# Patient Record
Sex: FEMALE | Race: WHITE | HISPANIC OR LATINO
[De-identification: names, ages, dates, MRNs, and addresses within clinical notes are randomized per-mention and may not be internally consistent; named-entity substitution may affect disease eponyms.]

---

## 2018-09-10 ENCOUNTER — RECORD ABSTRACTING (OUTPATIENT)
Age: 53
End: 2018-09-10

## 2018-09-10 DIAGNOSIS — Z86.59 PERSONAL HISTORY OF OTHER MENTAL AND BEHAVIORAL DISORDERS: ICD-10-CM

## 2018-09-10 DIAGNOSIS — Z99.89 OBSTRUCTIVE SLEEP APNEA (ADULT) (PEDIATRIC): ICD-10-CM

## 2018-09-10 DIAGNOSIS — Z78.9 OTHER SPECIFIED HEALTH STATUS: ICD-10-CM

## 2018-09-10 DIAGNOSIS — R73.01 IMPAIRED FASTING GLUCOSE: ICD-10-CM

## 2018-09-10 DIAGNOSIS — E55.9 VITAMIN D DEFICIENCY, UNSPECIFIED: ICD-10-CM

## 2018-09-10 DIAGNOSIS — Z86.39 PERSONAL HISTORY OF OTHER ENDOCRINE, NUTRITIONAL AND METABOLIC DISEASE: ICD-10-CM

## 2018-09-10 DIAGNOSIS — Z86.018 PERSONAL HISTORY OF OTHER BENIGN NEOPLASM: ICD-10-CM

## 2018-09-10 DIAGNOSIS — G47.33 OBSTRUCTIVE SLEEP APNEA (ADULT) (PEDIATRIC): ICD-10-CM

## 2018-09-10 DIAGNOSIS — I10 ESSENTIAL (PRIMARY) HYPERTENSION: ICD-10-CM

## 2018-09-10 DIAGNOSIS — Z83.3 FAMILY HISTORY OF DIABETES MELLITUS: ICD-10-CM

## 2018-09-10 DIAGNOSIS — Z83.49 FAMILY HISTORY OF OTHER ENDOCRINE, NUTRITIONAL AND METABOLIC DISEASES: ICD-10-CM

## 2018-09-10 LAB
HBA1C MFR BLD HPLC: 5.8
HBA1C MFR BLD: 5.8
HBA1C MFR BLD: 5.8

## 2018-09-14 ENCOUNTER — APPOINTMENT (OUTPATIENT)
Dept: ENDOCRINOLOGY | Facility: CLINIC | Age: 53
End: 2018-09-14

## 2019-10-28 ENCOUNTER — EMERGENCY (EMERGENCY)
Facility: HOSPITAL | Age: 54
LOS: 1 days | Discharge: HOME | End: 2019-10-28
Attending: EMERGENCY MEDICINE
Payer: COMMERCIAL

## 2019-10-28 VITALS
HEART RATE: 70 BPM | SYSTOLIC BLOOD PRESSURE: 140 MMHG | DIASTOLIC BLOOD PRESSURE: 83 MMHG | TEMPERATURE: 97 F | OXYGEN SATURATION: 100 % | RESPIRATION RATE: 18 BRPM

## 2019-10-28 VITALS
TEMPERATURE: 98 F | RESPIRATION RATE: 18 BRPM | OXYGEN SATURATION: 99 % | WEIGHT: 212.08 LBS | HEIGHT: 63 IN | HEART RATE: 65 BPM | SYSTOLIC BLOOD PRESSURE: 148 MMHG | DIASTOLIC BLOOD PRESSURE: 85 MMHG

## 2019-10-28 LAB
ALBUMIN SERPL ELPH-MCNC: 4.5 G/DL — SIGNIFICANT CHANGE UP (ref 3.3–5.2)
ALP SERPL-CCNC: 132 U/L — HIGH (ref 40–120)
ALT FLD-CCNC: 21 U/L — SIGNIFICANT CHANGE UP
ANION GAP SERPL CALC-SCNC: 11 MMOL/L — SIGNIFICANT CHANGE UP (ref 5–17)
AST SERPL-CCNC: 20 U/L — SIGNIFICANT CHANGE UP
BASOPHILS # BLD AUTO: 0.04 K/UL — SIGNIFICANT CHANGE UP (ref 0–0.2)
BASOPHILS NFR BLD AUTO: 0.6 % — SIGNIFICANT CHANGE UP (ref 0–2)
BILIRUB SERPL-MCNC: 0.3 MG/DL — LOW (ref 0.4–2)
BUN SERPL-MCNC: 18 MG/DL — SIGNIFICANT CHANGE UP (ref 8–20)
CALCIUM SERPL-MCNC: 9.8 MG/DL — SIGNIFICANT CHANGE UP (ref 8.6–10.2)
CHLORIDE SERPL-SCNC: 104 MMOL/L — SIGNIFICANT CHANGE UP (ref 98–107)
CO2 SERPL-SCNC: 27 MMOL/L — SIGNIFICANT CHANGE UP (ref 22–29)
CREAT SERPL-MCNC: 0.49 MG/DL — LOW (ref 0.5–1.3)
EOSINOPHIL # BLD AUTO: 0.2 K/UL — SIGNIFICANT CHANGE UP (ref 0–0.5)
EOSINOPHIL NFR BLD AUTO: 3.2 % — SIGNIFICANT CHANGE UP (ref 0–6)
GLUCOSE SERPL-MCNC: 104 MG/DL — SIGNIFICANT CHANGE UP (ref 70–115)
HCT VFR BLD CALC: 43.3 % — SIGNIFICANT CHANGE UP (ref 34.5–45)
HGB BLD-MCNC: 13.5 G/DL — SIGNIFICANT CHANGE UP (ref 11.5–15.5)
IMM GRANULOCYTES NFR BLD AUTO: 0.2 % — SIGNIFICANT CHANGE UP (ref 0–1.5)
INR BLD: 0.99 RATIO — SIGNIFICANT CHANGE UP (ref 0.88–1.16)
LYMPHOCYTES # BLD AUTO: 1.97 K/UL — SIGNIFICANT CHANGE UP (ref 1–3.3)
LYMPHOCYTES # BLD AUTO: 31.6 % — SIGNIFICANT CHANGE UP (ref 13–44)
MCHC RBC-ENTMCNC: 28 PG — SIGNIFICANT CHANGE UP (ref 27–34)
MCHC RBC-ENTMCNC: 31.2 GM/DL — LOW (ref 32–36)
MCV RBC AUTO: 89.8 FL — SIGNIFICANT CHANGE UP (ref 80–100)
MONOCYTES # BLD AUTO: 0.47 K/UL — SIGNIFICANT CHANGE UP (ref 0–0.9)
MONOCYTES NFR BLD AUTO: 7.5 % — SIGNIFICANT CHANGE UP (ref 2–14)
NEUTROPHILS # BLD AUTO: 3.54 K/UL — SIGNIFICANT CHANGE UP (ref 1.8–7.4)
NEUTROPHILS NFR BLD AUTO: 56.9 % — SIGNIFICANT CHANGE UP (ref 43–77)
PLATELET # BLD AUTO: 253 K/UL — SIGNIFICANT CHANGE UP (ref 150–400)
POTASSIUM SERPL-MCNC: 4.1 MMOL/L — SIGNIFICANT CHANGE UP (ref 3.5–5.3)
POTASSIUM SERPL-SCNC: 4.1 MMOL/L — SIGNIFICANT CHANGE UP (ref 3.5–5.3)
PROT SERPL-MCNC: 7.7 G/DL — SIGNIFICANT CHANGE UP (ref 6.6–8.7)
PROTHROM AB SERPL-ACNC: 11.4 SEC — SIGNIFICANT CHANGE UP (ref 10–12.9)
RBC # BLD: 4.82 M/UL — SIGNIFICANT CHANGE UP (ref 3.8–5.2)
RBC # FLD: 13.5 % — SIGNIFICANT CHANGE UP (ref 10.3–14.5)
SODIUM SERPL-SCNC: 142 MMOL/L — SIGNIFICANT CHANGE UP (ref 135–145)
WBC # BLD: 6.23 K/UL — SIGNIFICANT CHANGE UP (ref 3.8–10.5)
WBC # FLD AUTO: 6.23 K/UL — SIGNIFICANT CHANGE UP (ref 3.8–10.5)

## 2019-10-28 PROCEDURE — 85027 COMPLETE CBC AUTOMATED: CPT

## 2019-10-28 PROCEDURE — 99284 EMERGENCY DEPT VISIT MOD MDM: CPT | Mod: 25

## 2019-10-28 PROCEDURE — 96374 THER/PROPH/DIAG INJ IV PUSH: CPT

## 2019-10-28 PROCEDURE — 70450 CT HEAD/BRAIN W/O DYE: CPT

## 2019-10-28 PROCEDURE — 36415 COLL VENOUS BLD VENIPUNCTURE: CPT

## 2019-10-28 PROCEDURE — 85610 PROTHROMBIN TIME: CPT

## 2019-10-28 PROCEDURE — 96375 TX/PRO/DX INJ NEW DRUG ADDON: CPT

## 2019-10-28 PROCEDURE — 70450 CT HEAD/BRAIN W/O DYE: CPT | Mod: 26

## 2019-10-28 PROCEDURE — 96376 TX/PRO/DX INJ SAME DRUG ADON: CPT

## 2019-10-28 PROCEDURE — 80053 COMPREHEN METABOLIC PANEL: CPT

## 2019-10-28 PROCEDURE — 99284 EMERGENCY DEPT VISIT MOD MDM: CPT

## 2019-10-28 RX ORDER — KETOROLAC TROMETHAMINE 30 MG/ML
15 SYRINGE (ML) INJECTION ONCE
Refills: 0 | Status: DISCONTINUED | OUTPATIENT
Start: 2019-10-28 | End: 2019-10-28

## 2019-10-28 RX ORDER — SODIUM CHLORIDE 9 MG/ML
1000 INJECTION INTRAMUSCULAR; INTRAVENOUS; SUBCUTANEOUS ONCE
Refills: 0 | Status: COMPLETED | OUTPATIENT
Start: 2019-10-28 | End: 2019-10-28

## 2019-10-28 RX ORDER — METOCLOPRAMIDE HCL 10 MG
10 TABLET ORAL ONCE
Refills: 0 | Status: COMPLETED | OUTPATIENT
Start: 2019-10-28 | End: 2019-10-28

## 2019-10-28 RX ORDER — SODIUM CHLORIDE 9 MG/ML
3 INJECTION INTRAMUSCULAR; INTRAVENOUS; SUBCUTANEOUS EVERY 8 HOURS
Refills: 0 | Status: DISCONTINUED | OUTPATIENT
Start: 2019-10-28 | End: 2019-11-17

## 2019-10-28 RX ADMIN — Medication 10 MILLIGRAM(S): at 06:54

## 2019-10-28 RX ADMIN — SODIUM CHLORIDE 1000 MILLILITER(S): 9 INJECTION INTRAMUSCULAR; INTRAVENOUS; SUBCUTANEOUS at 06:54

## 2019-10-28 RX ADMIN — Medication 15 MILLIGRAM(S): at 06:53

## 2019-10-28 RX ADMIN — Medication 2 TABLET(S): at 08:30

## 2019-10-28 RX ADMIN — Medication 15 MILLIGRAM(S): at 08:30

## 2019-10-28 NOTE — ED ADULT NURSE NOTE - OBJECTIVE STATEMENT
Pt. presents AxOx4 to ED complaining of 9/20 headache radiating to R. neck with light sensitivity. Pt. reports having chronic migraines, pt was taking Tylenol multiple times at home with little to no relief. Pt. denies numbness, tingling, vision changes. Pt. reports last dose of Tylenol at 0000.

## 2019-10-28 NOTE — ED PROVIDER NOTE - PATIENT PORTAL LINK FT
You can access the FollowMyHealth Patient Portal offered by [facility] by registering at the following website: [email or web address]. By joining [employer]’s FollowMyHealth portal, you will also be able to view your health information using other applications (apps) compatible with our system.

## 2019-10-28 NOTE — ED PROVIDER NOTE - PROGRESS NOTE DETAILS
John: pt signed out to me with headache, no wbc, ct head neg, pt states pain starting to improve, no photophobia or neck pain/stiffness, nl neuro. received 15 toradol and 10 reglan ~45 mins prior, still with some pain, will give fiorcet and another 15mg toradol, reassess. pt/daughter report has had one similar episode of migraines that were as intense as this in past about 8 months ago, self resolved. follows with neuro. John: pt feels much better, states pain completely resolved, sitting up talking and smiling, nad. sofya prescribe fioricet, has neuro f/u. return precautions stable for d/c.

## 2019-10-28 NOTE — ED ADULT TRIAGE NOTE - CHIEF COMPLAINT QUOTE
Pt. complaining of migraine radiating to right neck that began yesterday.  Pt. states she has a history of migraines but never one this bad and never one that radiates down her neck.  Pt. states headache is located on her right side and is severe behind her right eye and it radiates to posterior right side.   Pt. complaining of concurrent nausea.  Pt.  has been taking tylenol for pain without relief, last dose taken at 00:00.  Pt. denies vision changes.

## 2019-10-28 NOTE — ED PROVIDER NOTE - OBJECTIVE STATEMENT
53 yo female presents complaining of generalized headache starting three days ago. She states that the headache has gradually increased over time, and is localized behind her eyes, and radiates down into her maxillary region and right side of her neck. She states walking makes it worse and acetaminophen helped very little. Denies fever, chills, trauma., focal neurologic symptoms or other concerns. Patient states she was taking acetaminophen 650 mg every 6 hours for the past two days. However, nothing was taken since 10 pm last night. Patient complaining of some nausea.

## 2019-10-28 NOTE — ED ADULT NURSE NOTE - CHPI ED NUR SYMPTOMS NEG
no fever/no nausea/no change in level of consciousness/no vomiting/no loss of consciousness/no numbness/no weakness/no dizziness/no confusion/no blurred vision

## 2019-10-28 NOTE — ED PROVIDER NOTE - CHPI ED SYMPTOMS NEG
no confusion/no weakness/no dizziness/no fever/no loss of consciousness/no numbness/no change in level of consciousness/no blurred vision/no vomiting

## 2019-10-28 NOTE — ED ADULT NURSE NOTE - CAS EDN DISCHARGE ASSESSMENT
Alert and oriented to person, place and time/Symptoms improved/Dressing clean and dry/No adverse reaction to first time med in ED/Patient baseline mental status/Awake

## 2020-02-14 ENCOUNTER — APPOINTMENT (OUTPATIENT)
Dept: PULMONOLOGY | Facility: CLINIC | Age: 55
End: 2020-02-14

## 2020-03-05 ENCOUNTER — APPOINTMENT (OUTPATIENT)
Dept: GASTROENTEROLOGY | Facility: CLINIC | Age: 55
End: 2020-03-05
Payer: COMMERCIAL

## 2020-03-05 VITALS
SYSTOLIC BLOOD PRESSURE: 134 MMHG | HEART RATE: 75 BPM | BODY MASS INDEX: 35.36 KG/M2 | HEIGHT: 66 IN | DIASTOLIC BLOOD PRESSURE: 98 MMHG | RESPIRATION RATE: 17 BRPM | WEIGHT: 220 LBS

## 2020-03-05 PROCEDURE — 99203 OFFICE O/P NEW LOW 30 MIN: CPT

## 2020-03-05 RX ORDER — METFORMIN ER 500 MG 500 MG/1
500 TABLET ORAL
Refills: 0 | Status: DISCONTINUED | COMMUNITY
End: 2020-03-05

## 2020-03-05 NOTE — ASSESSMENT
[FreeTextEntry1] : Patient is at average risk for colorectal cancer.The bowel preparation was discussed at length. Risks (including bleeding, pain, perforation, incomplete examination, splenic laceration, adverse reactions to medications, aspiration and death), benefits and alternatives were discussed. Patient is agreeable for the colonoscopy. The patient is medically optimized for the procedure. We will schedule the patient for the procedure. Bowel preparation was sent to the pharmacy.\par \par Alton Crain MD\par Gastroenterology \par \par

## 2020-03-05 NOTE — HISTORY OF PRESENT ILLNESS
[de-identified] : Patient arrived for consultation. The patient has a history of sleeve gastrectomy. She has lost about 50 pounds after the surgery. She is here for consultation for a colonoscopy. She is denying any GI symptoms. He never had a colonoscopy before. No family history of colorectal cancer. No cardiac disease or pulmonary disease. No change in bowel habits or any blood in stool

## 2020-04-05 ENCOUNTER — EMERGENCY (EMERGENCY)
Facility: HOSPITAL | Age: 55
LOS: 1 days | Discharge: HOME | End: 2020-04-05
Attending: EMERGENCY MEDICINE
Payer: COMMERCIAL

## 2020-04-05 VITALS — RESPIRATION RATE: 20 BRPM | OXYGEN SATURATION: 94 %

## 2020-04-05 VITALS
WEIGHT: 210.1 LBS | OXYGEN SATURATION: 96 % | RESPIRATION RATE: 25 BRPM | SYSTOLIC BLOOD PRESSURE: 90 MMHG | TEMPERATURE: 103 F | HEIGHT: 66 IN | HEART RATE: 101 BPM | DIASTOLIC BLOOD PRESSURE: 61 MMHG

## 2020-04-05 LAB
ALBUMIN SERPL ELPH-MCNC: 4 G/DL — SIGNIFICANT CHANGE UP (ref 3.3–5.2)
ALP SERPL-CCNC: 146 U/L — HIGH (ref 40–120)
ALT FLD-CCNC: 85 U/L — HIGH
ANION GAP SERPL CALC-SCNC: 14 MMOL/L — SIGNIFICANT CHANGE UP (ref 5–17)
AST SERPL-CCNC: 51 U/L — HIGH
BASOPHILS # BLD AUTO: 0.01 K/UL — SIGNIFICANT CHANGE UP (ref 0–0.2)
BASOPHILS NFR BLD AUTO: 0.1 % — SIGNIFICANT CHANGE UP (ref 0–2)
BILIRUB SERPL-MCNC: 0.4 MG/DL — SIGNIFICANT CHANGE UP (ref 0.4–2)
BUN SERPL-MCNC: 13 MG/DL — SIGNIFICANT CHANGE UP (ref 8–20)
CALCIUM SERPL-MCNC: 8.8 MG/DL — SIGNIFICANT CHANGE UP (ref 8.6–10.2)
CHLORIDE SERPL-SCNC: 100 MMOL/L — SIGNIFICANT CHANGE UP (ref 98–107)
CK SERPL-CCNC: 42 U/L — SIGNIFICANT CHANGE UP (ref 25–170)
CO2 SERPL-SCNC: 23 MMOL/L — SIGNIFICANT CHANGE UP (ref 22–29)
CREAT SERPL-MCNC: 0.56 MG/DL — SIGNIFICANT CHANGE UP (ref 0.5–1.3)
EOSINOPHIL # BLD AUTO: 0 K/UL — SIGNIFICANT CHANGE UP (ref 0–0.5)
EOSINOPHIL NFR BLD AUTO: 0 % — SIGNIFICANT CHANGE UP (ref 0–6)
GLUCOSE SERPL-MCNC: 118 MG/DL — HIGH (ref 70–99)
HCT VFR BLD CALC: 38.7 % — SIGNIFICANT CHANGE UP (ref 34.5–45)
HGB BLD-MCNC: 12.7 G/DL — SIGNIFICANT CHANGE UP (ref 11.5–15.5)
IMM GRANULOCYTES NFR BLD AUTO: 0.6 % — SIGNIFICANT CHANGE UP (ref 0–1.5)
LACTATE BLDV-MCNC: 1 MMOL/L — SIGNIFICANT CHANGE UP (ref 0.5–2)
LYMPHOCYTES # BLD AUTO: 1.09 K/UL — SIGNIFICANT CHANGE UP (ref 1–3.3)
LYMPHOCYTES # BLD AUTO: 15.9 % — SIGNIFICANT CHANGE UP (ref 13–44)
MCHC RBC-ENTMCNC: 28.2 PG — SIGNIFICANT CHANGE UP (ref 27–34)
MCHC RBC-ENTMCNC: 32.8 GM/DL — SIGNIFICANT CHANGE UP (ref 32–36)
MCV RBC AUTO: 86 FL — SIGNIFICANT CHANGE UP (ref 80–100)
MONOCYTES # BLD AUTO: 0.38 K/UL — SIGNIFICANT CHANGE UP (ref 0–0.9)
MONOCYTES NFR BLD AUTO: 5.5 % — SIGNIFICANT CHANGE UP (ref 2–14)
NEUTROPHILS # BLD AUTO: 5.34 K/UL — SIGNIFICANT CHANGE UP (ref 1.8–7.4)
NEUTROPHILS NFR BLD AUTO: 77.9 % — HIGH (ref 43–77)
PLATELET # BLD AUTO: 221 K/UL — SIGNIFICANT CHANGE UP (ref 150–400)
POTASSIUM SERPL-MCNC: 4.1 MMOL/L — SIGNIFICANT CHANGE UP (ref 3.5–5.3)
POTASSIUM SERPL-SCNC: 4.1 MMOL/L — SIGNIFICANT CHANGE UP (ref 3.5–5.3)
PROT SERPL-MCNC: 7.3 G/DL — SIGNIFICANT CHANGE UP (ref 6.6–8.7)
RBC # BLD: 4.5 M/UL — SIGNIFICANT CHANGE UP (ref 3.8–5.2)
RBC # FLD: 13.2 % — SIGNIFICANT CHANGE UP (ref 10.3–14.5)
SODIUM SERPL-SCNC: 137 MMOL/L — SIGNIFICANT CHANGE UP (ref 135–145)
TROPONIN T SERPL-MCNC: <0.01 NG/ML — SIGNIFICANT CHANGE UP (ref 0–0.06)
WBC # BLD: 6.86 K/UL — SIGNIFICANT CHANGE UP (ref 3.8–10.5)
WBC # FLD AUTO: 6.86 K/UL — SIGNIFICANT CHANGE UP (ref 3.8–10.5)

## 2020-04-05 PROCEDURE — 87040 BLOOD CULTURE FOR BACTERIA: CPT

## 2020-04-05 PROCEDURE — 94640 AIRWAY INHALATION TREATMENT: CPT

## 2020-04-05 PROCEDURE — 93010 ELECTROCARDIOGRAM REPORT: CPT

## 2020-04-05 PROCEDURE — 84484 ASSAY OF TROPONIN QUANT: CPT

## 2020-04-05 PROCEDURE — 99285 EMERGENCY DEPT VISIT HI MDM: CPT

## 2020-04-05 PROCEDURE — 96375 TX/PRO/DX INJ NEW DRUG ADDON: CPT

## 2020-04-05 PROCEDURE — 85027 COMPLETE CBC AUTOMATED: CPT

## 2020-04-05 PROCEDURE — 82550 ASSAY OF CK (CPK): CPT

## 2020-04-05 PROCEDURE — 80053 COMPREHEN METABOLIC PANEL: CPT

## 2020-04-05 PROCEDURE — 36415 COLL VENOUS BLD VENIPUNCTURE: CPT

## 2020-04-05 PROCEDURE — 99284 EMERGENCY DEPT VISIT MOD MDM: CPT | Mod: 25

## 2020-04-05 PROCEDURE — 93005 ELECTROCARDIOGRAM TRACING: CPT

## 2020-04-05 PROCEDURE — 96374 THER/PROPH/DIAG INJ IV PUSH: CPT

## 2020-04-05 PROCEDURE — 83605 ASSAY OF LACTIC ACID: CPT

## 2020-04-05 PROCEDURE — T1013: CPT

## 2020-04-05 PROCEDURE — 71250 CT THORAX DX C-: CPT | Mod: 26

## 2020-04-05 PROCEDURE — 71250 CT THORAX DX C-: CPT

## 2020-04-05 RX ORDER — ACETAMINOPHEN 500 MG
2 TABLET ORAL
Qty: 60 | Refills: 0
Start: 2020-04-05 | End: 2020-04-09

## 2020-04-05 RX ORDER — ALBUTEROL 90 UG/1
2 AEROSOL, METERED ORAL
Qty: 1 | Refills: 0
Start: 2020-04-05

## 2020-04-05 RX ORDER — ONDANSETRON 8 MG/1
4 TABLET, FILM COATED ORAL ONCE
Refills: 0 | Status: COMPLETED | OUTPATIENT
Start: 2020-04-05 | End: 2020-04-05

## 2020-04-05 RX ORDER — FAMOTIDINE 10 MG/ML
20 INJECTION INTRAVENOUS ONCE
Refills: 0 | Status: COMPLETED | OUTPATIENT
Start: 2020-04-05 | End: 2020-04-05

## 2020-04-05 RX ORDER — MAGNESIUM SULFATE 500 MG/ML
2 VIAL (ML) INJECTION ONCE
Refills: 0 | Status: COMPLETED | OUTPATIENT
Start: 2020-04-05 | End: 2020-04-05

## 2020-04-05 RX ORDER — SODIUM CHLORIDE 9 MG/ML
2500 INJECTION INTRAMUSCULAR; INTRAVENOUS; SUBCUTANEOUS ONCE
Refills: 0 | Status: COMPLETED | OUTPATIENT
Start: 2020-04-05 | End: 2020-04-05

## 2020-04-05 RX ORDER — ACETAMINOPHEN 500 MG
2 TABLET ORAL
Qty: 60 | Refills: 0
Start: 2020-04-05 | End: 2020-04-10

## 2020-04-05 RX ORDER — ACETAMINOPHEN 500 MG
650 TABLET ORAL ONCE
Refills: 0 | Status: COMPLETED | OUTPATIENT
Start: 2020-04-05 | End: 2020-04-05

## 2020-04-05 RX ORDER — ALBUTEROL 90 UG/1
1 AEROSOL, METERED ORAL ONCE
Refills: 0 | Status: COMPLETED | OUTPATIENT
Start: 2020-04-05 | End: 2020-04-05

## 2020-04-05 RX ADMIN — ALBUTEROL 1 PUFF(S): 90 AEROSOL, METERED ORAL at 19:42

## 2020-04-05 RX ADMIN — Medication 650 MILLIGRAM(S): at 18:52

## 2020-04-05 RX ADMIN — Medication 50 GRAM(S): at 19:41

## 2020-04-05 RX ADMIN — ONDANSETRON 4 MILLIGRAM(S): 8 TABLET, FILM COATED ORAL at 19:42

## 2020-04-05 RX ADMIN — SODIUM CHLORIDE 2500 MILLILITER(S): 9 INJECTION INTRAMUSCULAR; INTRAVENOUS; SUBCUTANEOUS at 19:41

## 2020-04-05 RX ADMIN — FAMOTIDINE 20 MILLIGRAM(S): 10 INJECTION INTRAVENOUS at 19:42

## 2020-04-05 NOTE — ED STATDOCS - PATIENT PORTAL LINK FT
You can access the FollowMyHealth Patient Portal offered by [facility] by registering at the following website: [email or web address]. By joining [employer]’s FollowMyHealth portal, you will also be able to view your health information using other applications (apps) compatible with our system.

## 2020-04-05 NOTE — ED STATDOCS - CLINICAL SUMMARY MEDICAL DECISION MAKING FREE TEXT BOX
Labs, line, EKG, CT chest, medications Labs, line, EKG, CT chest, medications, pt appears dehydraiton, given she was on azithro and hydroxychloroquinine, will checl qtc as well and reassess, pt had cxr earlier which showed early pna, will do ct chest w/o contrast for better characterizaiton Labs, line, EKG, CT chest, medications, pt appears dehydration, given she was on azithro , will check qtc as well and reassess, pt had cxr earlier which showed early pna, will do ct chest w/o contrast for better characterization

## 2020-04-05 NOTE — ED STATDOCS - PROGRESS NOTE DETAILS
DIANE Lemus: Patient evaluated by intake physician. HPI/ROS/PE as noted above. Will follow up plan per intake physician and continue to assess patient. DIANE Lemus:  Reji. VS improved. Patient O2 96% on RA, 94% with ambulation. Patient stable for discharge. Discussed CT findings, continue abx, strict return precautions provided. Patient verbalizes understanding and agreement.

## 2020-04-05 NOTE — ED STATDOCS - OBJECTIVE STATEMENT
53 y/o female with PMHx of Asthma and Migraines presents to ED c/o SOB. Patient reports COVID + given Cefdinir and Azithromycin by Urgent care and now presents to ED feeling SOB, weak, pain on inspiration, and fever.     : Nicole

## 2020-04-10 LAB
CULTURE RESULTS: SIGNIFICANT CHANGE UP
SPECIMEN SOURCE: SIGNIFICANT CHANGE UP

## 2020-04-20 ENCOUNTER — APPOINTMENT (OUTPATIENT)
Dept: GASTROENTEROLOGY | Facility: CLINIC | Age: 55
End: 2020-04-20

## 2020-05-12 ENCOUNTER — APPOINTMENT (OUTPATIENT)
Dept: PULMONOLOGY | Facility: CLINIC | Age: 55
End: 2020-05-12
Payer: COMMERCIAL

## 2020-05-12 VITALS
RESPIRATION RATE: 16 BRPM | WEIGHT: 224 LBS | DIASTOLIC BLOOD PRESSURE: 72 MMHG | OXYGEN SATURATION: 98 % | HEART RATE: 75 BPM | HEIGHT: 65 IN | SYSTOLIC BLOOD PRESSURE: 128 MMHG | BODY MASS INDEX: 37.32 KG/M2

## 2020-05-12 DIAGNOSIS — Z86.59 PERSONAL HISTORY OF OTHER MENTAL AND BEHAVIORAL DISORDERS: ICD-10-CM

## 2020-05-12 DIAGNOSIS — Z86.79 PERSONAL HISTORY OF OTHER DISEASES OF THE CIRCULATORY SYSTEM: ICD-10-CM

## 2020-05-12 DIAGNOSIS — R06.02 SHORTNESS OF BREATH: ICD-10-CM

## 2020-05-12 PROCEDURE — 99204 OFFICE O/P NEW MOD 45 MIN: CPT

## 2020-05-12 NOTE — REASON FOR VISIT
[Initial] : an initial visit [Sleep Apnea] : sleep apnea [Obesity] : obesity [Shortness of Breath] : shortness of breath [Patient Declined  Services] : - None: Patient declined  services [FreeTextEntry3] : daughter translated [TWNoteComboBox1] : [place]

## 2020-05-12 NOTE — CONSULT LETTER
[Dear  ___] : Dear  [unfilled], [Consult Letter:] : I had the pleasure of evaluating your patient, [unfilled]. [Please see my note below.] : Please see my note below. [Consult Closing:] : Thank you very much for allowing me to participate in the care of this patient.  If you have any questions, please do not hesitate to contact me. [Sincerely,] : Sincerely, [FreeTextEntry3] : Benito Ambrocio MD, FCCP, D. ABSM\par Pulmonary and Sleep Medicine\par [facility] [place]

## 2020-05-12 NOTE — REVIEW OF SYSTEMS
[SOB on Exertion] : sob on exertion [GERD] : gerd [Back Pain] : back pain [Headache] : headache [Depression] : depression [Anxiety] : anxiety [Fever] : no fever [Chills] : no chills [Dry Eyes] : no dry eyes [Epistaxis] : no epistaxis [Eye Irritation] : no eye irritation [Nasal Congestion] : no nasal congestion [Postnasal Drip] : no postnasal drip [Sinus Problems] : no sinus problems [Cough] : no cough [Chest Tightness] : no chest tightness [Sputum] : no sputum [Dyspnea] : no dyspnea [Pleuritic Pain] : no pleuritic pain [Wheezing] : no wheezing [Chest Discomfort] : no chest discomfort [Edema] : no edema [Palpitations] : no palpitations [Syncope] : no syncope [Hay Fever] : no hay fever [Itchy Eyes] : no itchy eyes [Seasonal Allergies] : no seasonal allergies [Abdominal Pain] : no abdominal pain [Nausea] : no nausea [Vomiting] : no vomiting [Diarrhea] : no diarrhea [Dysuria] : no dysuria [Constipation] : no constipation [Anemia] : no anemia [Seizures] : no seizures [Dizziness] : no dizziness [Numbness] : no numbness [Paralysis] : no paralysis [Confusion] : no confusion [Diabetes] : no diabetes [Thyroid Problem] : no thyroid problem

## 2020-05-12 NOTE — HISTORY OF PRESENT ILLNESS
[Never] : never [Initial Evaluation] : an initial evaluation of [Excess Weight] : excess weight [Currently Experiencing] : The patient is currently experiencing symptoms. [Dyspnea] : dyspnea [Knee Pain] : knee pain [Joint Pain] : joint pain [Back Pain] : back pain [Fair Compliance] : fair compliance with treatment [Low Calorie Diet] : low calorie diet [Poor Symptom Control] : poor symptom control [Fair Tolerance] : fair tolerance of treatment [Sleep Apnea] : sleep apnea [Hypertension] : hypertension [High] : high [Low Calorie] : low calorie [Well Balanced Diet] : well balanced meals [TextBox_4] : Pt is a never smoker.\par She reports a h/o BERTHA and has been on CPAP therapy for 3 years.\par  [None] : No associated symptoms are reported [CPAP] : CPAP [Good Compliance] : good compliance with treatment [Good Tolerance] : good tolerance of treatment [Good Symptom Control] : good symptom control [de-identified] : at 10 cm of water

## 2020-05-12 NOTE — DISCUSSION/SUMMARY
[FreeTextEntry1] : \par #1. Continue current CPAP at therapy; presumed to be 10 cm of water to treat severe BERTHA per last CPAP titration study from 2016\par #2. Replace equipment as needed; ordered 5/12/20\par #3. Diet and exercise for weight loss\par #4. The patient does not appear to require chronic BD therapy at this time\par #5. SOBOE is likely related to weight given improvement with weight loss after bariatric surgery\par #6. Denies smoking hx\par #7. F/u in 4 months with compliance\par #8. Will try to get compliance from [employer]

## 2020-05-18 PROBLEM — G43.909 MIGRAINE, UNSPECIFIED, NOT INTRACTABLE, WITHOUT STATUS MIGRAINOSUS: Chronic | Status: ACTIVE | Noted: 2019-10-28

## 2020-05-20 ENCOUNTER — APPOINTMENT (OUTPATIENT)
Dept: OTOLARYNGOLOGY | Facility: CLINIC | Age: 55
End: 2020-05-20
Payer: COMMERCIAL

## 2020-05-20 VITALS
SYSTOLIC BLOOD PRESSURE: 128 MMHG | HEIGHT: 62 IN | HEART RATE: 76 BPM | BODY MASS INDEX: 40.48 KG/M2 | DIASTOLIC BLOOD PRESSURE: 84 MMHG | WEIGHT: 220 LBS

## 2020-05-20 VITALS — TEMPERATURE: 97.7 F

## 2020-05-20 DIAGNOSIS — H92.01 OTALGIA, RIGHT EAR: ICD-10-CM

## 2020-05-20 DIAGNOSIS — H90.42 SENSORINEURAL HEARING LOSS, UNILATERAL, LEFT EAR, WITH UNRESTRICTED HEARING ON THE CONTRALATERAL SIDE: ICD-10-CM

## 2020-05-20 DIAGNOSIS — H69.83 OTHER SPECIFIED DISORDERS OF EUSTACHIAN TUBE, BILATERAL: ICD-10-CM

## 2020-05-20 DIAGNOSIS — G50.1 ATYPICAL FACIAL PAIN: ICD-10-CM

## 2020-05-20 PROCEDURE — 99204 OFFICE O/P NEW MOD 45 MIN: CPT | Mod: 25

## 2020-05-20 PROCEDURE — 92567 TYMPANOMETRY: CPT

## 2020-05-20 PROCEDURE — 92553 AUDIOMETRY AIR & BONE: CPT

## 2020-05-20 NOTE — REASON FOR VISIT
[Hearing Loss] : hearing loss [Initial Consultation] : an initial consultation for [FreeTextEntry2] : pain

## 2020-05-20 NOTE — HISTORY OF PRESENT ILLNESS
[de-identified] : co 3 weeks bilateral ear pain\par co headaches periorbital and forehead w ear pain\par hx migrain headaches amitriptyline 75 for headaches\par longstanding loss as-no hearing\par now decrease rt ear\par no uri neg allergy, neg c spine,\par pmh as loss only

## 2020-05-20 NOTE — REVIEW OF SYSTEMS
[Ear Pain] : ear pain [Ear Itch] : ear itch [Problem Snoring] : problem snoring [Hearing Loss] : hearing loss [Snoring With Pauses] : snoring with pauses [Eye Pain] : eye pain [Anxiety] : anxiety [Joint Pain] : joint pain [Depression] : depression [Muscle Weakness] : muscle weakness [Negative] : Heme/Lymph [Patient Intake Form Reviewed] : Patient intake form was reviewed [FreeTextEntry1] : headache, fatigue, daytime sleepiness,

## 2020-05-20 NOTE — ASSESSMENT
[FreeTextEntry1] : exam unremarkable\par audio ad  hearing wnl as no hearing tmp a/a\par unclear source ear pain may be part of migraine or muscle tension headaches\par fu prn

## 2020-08-27 DIAGNOSIS — Z01.818 ENCOUNTER FOR OTHER PREPROCEDURAL EXAMINATION: ICD-10-CM

## 2020-08-30 ENCOUNTER — APPOINTMENT (OUTPATIENT)
Dept: DISASTER EMERGENCY | Facility: CLINIC | Age: 55
End: 2020-08-30

## 2020-08-30 LAB — SARS-COV-2 N GENE NPH QL NAA+PROBE: NOT DETECTED

## 2020-09-02 ENCOUNTER — APPOINTMENT (OUTPATIENT)
Dept: GASTROENTEROLOGY | Facility: GI CENTER | Age: 55
End: 2020-09-02

## 2020-09-03 ENCOUNTER — APPOINTMENT (OUTPATIENT)
Dept: FAMILY MEDICINE | Facility: CLINIC | Age: 55
End: 2020-09-03

## 2020-10-06 ENCOUNTER — APPOINTMENT (OUTPATIENT)
Dept: PULMONOLOGY | Facility: CLINIC | Age: 55
End: 2020-10-06

## 2021-01-01 NOTE — ED STATDOCS - NSFOLLOWUPINSTRUCTIONS_ED_ALL_ED_FT
Statement Selected YOBANY TODAS LAS INSTRUCCIONES ADJUNTAS PARA CORONAVIRUS INMEDIATAMENTE   - No vaya al trabajo/escuela/áreas públicas/transporte público.  - Autocuarentena analisa 14 días.  - Supervise elizabeth síntomas utilizando el rastreador de síntomas.  - Practicar el distanciamiento social y evitar el contacto con los demás. Evite compartir artículos personales para el hogar.   - Practicar la higiene adecuada de las alon. Desinfectar superficies con frecuencia. Cúbrase la tos y estornude.   - Manténgase hidratado.      BUSCA ATENCIÓN MÉDICA INMEDIATA SI TIENES CUALQUIERA DE LOS SIGUIENTES SÍNTOMAS  **Busca atención médica inmediata si desarrollas nuevos/empeoramiento, signos/síntomas o preocupaciones, incluyendo, yudi no limitado a dificultad para respirar, dificultad para respirar, dolor en el pecho, confusión, debilidad severa.**    Si rashel que está experimentando christos emergencia médica llame al 9-1-1.    READ ALL ATTACHED INSTRUCTIONS FOR CORONAVIRUS IMMEDIATELY   - Do not go to work/school/public areas/public transit.  - Self quarantine for 14 days.  - Monitor your symptoms using symptom tracker.  - Practice social distancing and avoid contact with others. Avoid sharing personal household items.   - Practice proper hand hygiene. Disinfect surfaces frequently. Cover your coughs and sneezes.   - Stay hydrated.    - Acetaminophen 650mg and ibuprofen 200mg every 4 hours for fever.  - Practice intermittent prone postioning.     SEEK IMMEDIATE MEDICAL CARE IF YOU HAVE ANY OF THE FOLLOWING SYMPTOMS  **Seek immediate medicate attention if you develop new/worsening, signs/symptoms or concerns including, but not limited to shortness of breath, difficulty breathing, chest pain, confusion, severe weakness.**    If you believe you are experiencing a medical emergency call 9-1-1.

## 2021-09-20 ENCOUNTER — APPOINTMENT (OUTPATIENT)
Dept: DISASTER EMERGENCY | Facility: CLINIC | Age: 56
End: 2021-09-20

## 2021-09-21 LAB — SARS-COV-2 N GENE NPH QL NAA+PROBE: NOT DETECTED

## 2021-09-23 ENCOUNTER — RESULT REVIEW (OUTPATIENT)
Age: 56
End: 2021-09-23

## 2021-10-27 ENCOUNTER — LABORATORY RESULT (OUTPATIENT)
Age: 56
End: 2021-10-27

## 2021-10-27 ENCOUNTER — APPOINTMENT (OUTPATIENT)
Dept: FAMILY MEDICINE | Facility: CLINIC | Age: 56
End: 2021-10-27
Payer: COMMERCIAL

## 2021-10-27 VITALS
HEIGHT: 65 IN | DIASTOLIC BLOOD PRESSURE: 78 MMHG | OXYGEN SATURATION: 96 % | RESPIRATION RATE: 16 BRPM | SYSTOLIC BLOOD PRESSURE: 122 MMHG | HEART RATE: 90 BPM | TEMPERATURE: 97.9 F | BODY MASS INDEX: 40.82 KG/M2 | WEIGHT: 245 LBS

## 2021-10-27 DIAGNOSIS — Z80.0 FAMILY HISTORY OF MALIGNANT NEOPLASM OF DIGESTIVE ORGANS: ICD-10-CM

## 2021-10-27 PROCEDURE — 99386 PREV VISIT NEW AGE 40-64: CPT | Mod: 25

## 2021-10-27 PROCEDURE — G0447 BEHAVIOR COUNSEL OBESITY 15M: CPT | Mod: NC

## 2021-10-27 PROCEDURE — 36415 COLL VENOUS BLD VENIPUNCTURE: CPT

## 2021-10-27 RX ORDER — METHOCARBAMOL 500 MG/1
TABLET, FILM COATED ORAL
Refills: 0 | Status: DISCONTINUED | COMMUNITY
End: 2021-10-27

## 2021-10-27 RX ORDER — HYDROCHLOROTHIAZIDE 25 MG/1
25 TABLET ORAL DAILY
Refills: 0 | Status: DISCONTINUED | COMMUNITY
End: 2021-10-27

## 2021-10-27 RX ORDER — BUTALBITAL AND ACETAMINOPHEN 300; 50 MG/1; MG/1
50-300 TABLET ORAL
Refills: 0 | Status: DISCONTINUED | COMMUNITY
End: 2021-10-27

## 2021-10-27 RX ORDER — POLYETHYLENE GLYOCOL 3350, SODIUM CHLORIDE, SODIUM BICARBONATE AND POTASSIUM CHLORIDE 420; 11.2; 5.72; 1.48 G/4L; G/4L; G/4L; G/4L
420 POWDER, FOR SOLUTION NASOGASTRIC; ORAL
Qty: 1 | Refills: 0 | Status: DISCONTINUED | COMMUNITY
Start: 2020-03-06 | End: 2021-10-27

## 2021-10-27 RX ORDER — SODIUM SULFATE, POTASSIUM SULFATE, MAGNESIUM SULFATE 17.5; 3.13; 1.6 G/ML; G/ML; G/ML
17.5-3.13-1.6 SOLUTION, CONCENTRATE ORAL
Qty: 1 | Refills: 0 | Status: DISCONTINUED | COMMUNITY
Start: 2020-03-05 | End: 2021-10-27

## 2021-10-27 NOTE — COUNSELING
[Fall prevention counseling provided] : Fall prevention counseling provided [Behavioral health counseling provided] : Behavioral health counseling provided [Potential consequences of obesity discussed] : Potential consequences of obesity discussed [Benefits of weight loss discussed] : Benefits of weight loss discussed [Target Wt Loss Goal ___] : Weight Loss Goals: Target weight loss goal [unfilled] lbs [Weigh Self Weekly] : weigh self weekly [____ min/wk Activity] : [unfilled] min/wk activity [Good understanding] : Patient has a good understanding of disease, goals and obesity follow-up plan

## 2021-10-27 NOTE — HEALTH RISK ASSESSMENT
[Good] : ~his/her~  mood as  good [No] : In the past 12 months have you used drugs other than those required for medical reasons? No [No falls in past year] : Patient reported no falls in the past year [0] : 2) Feeling down, depressed, or hopeless: Not at all (0) [I have developed a follow-up plan documented below in the note.] : I have developed a follow-up plan documented below in the note. [Patient reported mammogram was normal] : Patient reported mammogram was normal [Patient declined PAP Smear] : Patient declined PAP Smear [HIV test declined] : HIV test declined [Hepatitis C test declined] : Hepatitis C test declined [With Family] : lives with family [Employed] : employed [] :  [# Of Children ___] : has [unfilled] children [Feels Safe at Home] : Feels safe at home [Fully functional (bathing, dressing, toileting, transferring, walking, feeding)] : Fully functional (bathing, dressing, toileting, transferring, walking, feeding) [Fully functional (using the telephone, shopping, preparing meals, housekeeping, doing laundry, using] : Fully functional and needs no help or supervision to perform IADLs (using the telephone, shopping, preparing meals, housekeeping, doing laundry, using transportation, managing medications and managing finances) [Smoke Detector] : smoke detector [Safety elements used in home] : safety elements used in home [Seat Belt] :  uses seat belt [Designated Healthcare Proxy] : Designated healthcare proxy [Name: ___] : Health Care Proxy's Name: [unfilled]  [Relationship: ___] : Relationship: [unfilled] [] : No [de-identified] : no [de-identified] : no [Sexually Active] : not sexually active [Reports changes in hearing] : Reports no changes in hearing [Reports changes in vision] : Reports no changes in vision [Reports changes in dental health] : Reports no changes in dental health [TB Exposure] : is not being exposed to tuberculosis [MammogramDate] : 2019 [PapSmearComments] : Hx Hysterectomy 2008 [ColonoscopyDate] : never [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

## 2021-10-27 NOTE — ASSESSMENT
[FreeTextEntry1] : Establishing PCP:\par \par HCM;\par -Blood and UA today\par Mammo: referral\par Colon cancer screening: FOBT kit\par Gyn: hx total hysterectomy\par Immunizations: refused flu shot\par \par Chronic Migraines:\par -On amitriptyline 100mg\par -F/up with Neurology\par \par Anxiety-Depression:\par -On Fluoxetine and Mirtazapine.\par -F/up with psychiatry PA\par \par S/P Bariatric surgery/ Obesity:\par -On Qsymia\par \par Further recommendations with lab results.\par \par

## 2021-10-27 NOTE — HISTORY OF PRESENT ILLNESS
[FreeTextEntry1] : Establish PCP [de-identified] : 55 y/o HF originally from [place], presents today to establish PCP.\par Previous PCP: Dr Saskia Dsouza\par Pt with medical hx significant for Anxiety-depression on Fluoxetine and mirtazapine, seen by psychiatry, PA: aileen Grijalva.\par She has a long hx MIgraines and takes Amitriptyline. Seen by Neurology.\par She has BERTHA on CPAP.\par She has a hx bariatric surgery on 2019, now on Qsymia.\par She states is under PT for Sciatic pain.

## 2021-10-29 ENCOUNTER — NON-APPOINTMENT (OUTPATIENT)
Age: 56
End: 2021-10-29

## 2021-10-29 LAB
25(OH)D3 SERPL-MCNC: 33.8 NG/ML
ALBUMIN SERPL ELPH-MCNC: 4.6 G/DL
ALP BLD-CCNC: 156 U/L
ALT SERPL-CCNC: 26 U/L
ANION GAP SERPL CALC-SCNC: 14 MMOL/L
APPEARANCE: ABNORMAL
AST SERPL-CCNC: 18 U/L
BASOPHILS # BLD AUTO: 0.05 K/UL
BASOPHILS NFR BLD AUTO: 0.6 %
BILIRUB SERPL-MCNC: 0.2 MG/DL
BILIRUBIN URINE: NEGATIVE
BLOOD URINE: NEGATIVE
BUN SERPL-MCNC: 24 MG/DL
CALCIUM SERPL-MCNC: 10.1 MG/DL
CHLORIDE SERPL-SCNC: 104 MMOL/L
CHOLEST SERPL-MCNC: 244 MG/DL
CO2 SERPL-SCNC: 24 MMOL/L
COLOR: YELLOW
CREAT SERPL-MCNC: 0.79 MG/DL
EOSINOPHIL # BLD AUTO: 0.3 K/UL
EOSINOPHIL NFR BLD AUTO: 3.5 %
ESTIMATED AVERAGE GLUCOSE: 126 MG/DL
GLUCOSE QUALITATIVE U: NEGATIVE
GLUCOSE SERPL-MCNC: 98 MG/DL
HBA1C MFR BLD HPLC: 6 %
HCT VFR BLD CALC: 42.6 %
HDLC SERPL-MCNC: 49 MG/DL
HGB BLD-MCNC: 13.7 G/DL
IMM GRANULOCYTES NFR BLD AUTO: 0.3 %
KETONES URINE: NORMAL
LDLC SERPL CALC-MCNC: 170 MG/DL
LEUKOCYTE ESTERASE URINE: ABNORMAL
LYMPHOCYTES # BLD AUTO: 1.89 K/UL
LYMPHOCYTES NFR BLD AUTO: 21.8 %
MAN DIFF?: NORMAL
MCHC RBC-ENTMCNC: 28 PG
MCHC RBC-ENTMCNC: 32.2 GM/DL
MCV RBC AUTO: 86.9 FL
MONOCYTES # BLD AUTO: 0.61 K/UL
MONOCYTES NFR BLD AUTO: 7 %
NEUTROPHILS # BLD AUTO: 5.79 K/UL
NEUTROPHILS NFR BLD AUTO: 66.8 %
NITRITE URINE: NEGATIVE
NONHDLC SERPL-MCNC: 195 MG/DL
PH URINE: 5.5
PLATELET # BLD AUTO: 320 K/UL
POTASSIUM SERPL-SCNC: 4.4 MMOL/L
PROT SERPL-MCNC: 7.6 G/DL
PROTEIN URINE: NORMAL
RBC # BLD: 4.9 M/UL
RBC # FLD: 14.9 %
SODIUM SERPL-SCNC: 142 MMOL/L
SPECIFIC GRAVITY URINE: 1.02
TRIGL SERPL-MCNC: 122 MG/DL
TSH SERPL-ACNC: 1.32 UIU/ML
UROBILINOGEN URINE: NORMAL
WBC # FLD AUTO: 8.67 K/UL

## 2021-11-03 LAB — HEMOCCULT STL QL IA: NEGATIVE

## 2021-11-04 ENCOUNTER — NON-APPOINTMENT (OUTPATIENT)
Age: 56
End: 2021-11-04

## 2021-11-20 ENCOUNTER — APPOINTMENT (OUTPATIENT)
Dept: MAMMOGRAPHY | Facility: CLINIC | Age: 56
End: 2021-11-20
Payer: COMMERCIAL

## 2021-11-20 ENCOUNTER — RESULT REVIEW (OUTPATIENT)
Age: 56
End: 2021-11-20

## 2021-11-20 ENCOUNTER — OUTPATIENT (OUTPATIENT)
Dept: OUTPATIENT SERVICES | Facility: HOSPITAL | Age: 56
LOS: 1 days | End: 2021-11-20
Payer: COMMERCIAL

## 2021-11-20 DIAGNOSIS — Z12.31 ENCOUNTER FOR SCREENING MAMMOGRAM FOR MALIGNANT NEOPLASM OF BREAST: ICD-10-CM

## 2021-11-20 PROCEDURE — 77063 BREAST TOMOSYNTHESIS BI: CPT

## 2021-11-20 PROCEDURE — 77067 SCR MAMMO BI INCL CAD: CPT

## 2021-11-20 PROCEDURE — 77067 SCR MAMMO BI INCL CAD: CPT | Mod: 26

## 2021-11-20 PROCEDURE — 77063 BREAST TOMOSYNTHESIS BI: CPT | Mod: 26

## 2021-12-10 ENCOUNTER — EMERGENCY (EMERGENCY)
Facility: HOSPITAL | Age: 56
LOS: 1 days | Discharge: HOME | End: 2021-12-10
Attending: EMERGENCY MEDICINE
Payer: COMMERCIAL

## 2021-12-10 VITALS
RESPIRATION RATE: 18 BRPM | SYSTOLIC BLOOD PRESSURE: 119 MMHG | OXYGEN SATURATION: 97 % | TEMPERATURE: 98 F | HEART RATE: 70 BPM | DIASTOLIC BLOOD PRESSURE: 79 MMHG

## 2021-12-10 VITALS
HEART RATE: 86 BPM | SYSTOLIC BLOOD PRESSURE: 131 MMHG | RESPIRATION RATE: 18 BRPM | TEMPERATURE: 99 F | OXYGEN SATURATION: 98 % | HEIGHT: 66 IN | DIASTOLIC BLOOD PRESSURE: 93 MMHG

## 2021-12-10 PROCEDURE — 99053 MED SERV 10PM-8AM 24 HR FAC: CPT

## 2021-12-10 PROCEDURE — 71101 X-RAY EXAM UNILAT RIBS/CHEST: CPT

## 2021-12-10 PROCEDURE — 99284 EMERGENCY DEPT VISIT MOD MDM: CPT

## 2021-12-10 PROCEDURE — 73030 X-RAY EXAM OF SHOULDER: CPT | Mod: 26,RT

## 2021-12-10 PROCEDURE — 71101 X-RAY EXAM UNILAT RIBS/CHEST: CPT | Mod: 26

## 2021-12-10 PROCEDURE — 73030 X-RAY EXAM OF SHOULDER: CPT

## 2021-12-10 PROCEDURE — 99284 EMERGENCY DEPT VISIT MOD MDM: CPT | Mod: 25

## 2021-12-10 RX ORDER — IBUPROFEN 200 MG
1 TABLET ORAL
Qty: 20 | Refills: 0
Start: 2021-12-10 | End: 2021-12-14

## 2021-12-10 RX ORDER — LIDOCAINE 4 G/100G
1 CREAM TOPICAL ONCE
Refills: 0 | Status: COMPLETED | OUTPATIENT
Start: 2021-12-10 | End: 2021-12-10

## 2021-12-10 RX ORDER — CYCLOBENZAPRINE HYDROCHLORIDE 10 MG/1
10 TABLET, FILM COATED ORAL ONCE
Refills: 0 | Status: COMPLETED | OUTPATIENT
Start: 2021-12-10 | End: 2021-12-10

## 2021-12-10 RX ORDER — IBUPROFEN 200 MG
600 TABLET ORAL ONCE
Refills: 0 | Status: COMPLETED | OUTPATIENT
Start: 2021-12-10 | End: 2021-12-10

## 2021-12-10 RX ORDER — CYCLOBENZAPRINE HYDROCHLORIDE 10 MG/1
1 TABLET, FILM COATED ORAL
Qty: 15 | Refills: 0
Start: 2021-12-10 | End: 2021-12-14

## 2021-12-10 RX ADMIN — LIDOCAINE 1 PATCH: 4 CREAM TOPICAL at 23:56

## 2021-12-10 RX ADMIN — CYCLOBENZAPRINE HYDROCHLORIDE 10 MILLIGRAM(S): 10 TABLET, FILM COATED ORAL at 23:54

## 2021-12-10 RX ADMIN — Medication 600 MILLIGRAM(S): at 23:58

## 2021-12-10 NOTE — ED PROVIDER NOTE - NS ED ROS FT
Const: Denies fever, chills  HEENT: Denies blurry vision, sore throat  Neck: Denies neck pain/stiffness  Resp: Denies coughing, SOB  Cardiovascular: Denies CP, palpitations, LE edema  GI: Denies nausea, vomiting, abdominal pain, diarrhea, constipation, blood in stool  : Denies urinary frequency/urgency/dysuria, hematuria  MSK: + back pain, + right shoulder pain  Neuro: Denies HA, dizziness, numbness, weakness  Skin: Denies rashes.

## 2021-12-10 NOTE — ED PROVIDER NOTE - PHYSICAL EXAMINATION
Const: Awake, alert and oriented. In no acute distress. Well appearing.  HEENT: NC/AT. No raccoon eyes, no birch sign, no epistaxis, no septal hematoma, no intraoral lacerations or bleeding, no tongue lacerations or abrasions.  Eyes: No scleral icterus. EOMI.  Neck:. Cervical collar in place. No midline TTP. No palpable step offs.  Chest: Chest wall stable, no flail chest, no crepitus on palpation.  Cardiac: Regular rate and regular rhythm. +S1/S2. No murmurs. 2+ Central pulses and symmetric. Peripheral pulses 2+ and symmetric. No LE edema.  Resp: Speaking in full sentences. No evidence of respiratory distress. No wheezes, rales or rhonchi.  Abd: Soft, non-tender, non-distended. Normal bowel sounds in all 4 quadrants. No guarding or rebound.  MSK: Pelvis stable. No obvious deformity. + TTP right shoulder, full ROM right shoulder. + TTP right lateral posterior chest wall.  Back: Spine midline and non-tender. No palpable step offs.  Skin: No rashes, abrasions or lacerations.  Neuro: Awake, alert & oriented x 3. GCS 15. Withdraws to pain symmetrically. Follows commands. 5/5 strength symmetrically all extremities.
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
## 2021-12-10 NOTE — ED PROVIDER NOTE - WR INTERPRETATION 1
MSK XR negative - No fracture, No dislocation, No foreign bodyCXR negative - No pneumothorax, No opacities, No free airCXR negative - No tracheal deviation, No pneumothorax, No subdiaphragmatic

## 2021-12-10 NOTE — ED ADULT TRIAGE NOTE - CADM TRG TX PRIOR TO ARRIVAL
Subjective


Time Seen by a Provider:  10:02


Subjective/Events-last exam


Pt seen and examined, states he feels better than yesterday and denies abdominal

pain.  He did note that maybe his lower abdomen is a little more distended.


Review of Systems


General:  No Chills, No Night Sweats


Pulmonary:  No Dyspnea, No Cough


Cardiovascular:  No: Chest Pain, Palpitations


Gastrointestinal:  No: Nausea, Vomiting, Abdominal Pain


Genitourinary:  No Dysuria, No Frequency





Objective


Exam





Vital Signs








  Date Time  Temp Pulse Resp B/P (MAP) Pulse Ox O2 Delivery O2 Flow Rate FiO2


 


5/12/21 11:10 36.4 70 18 102/62 (75) 98 Room Air  


 


5/12/21 08:00     98 Room Air  


 


5/12/21 07:05 36.3 74 18 102/57 (72) 98 Room Air  


 


5/12/21 04:00 36.3 71 18 104/65 (78) 100 Room Air  


 


5/12/21 02:39      Room Air  


 


5/12/21 00:15 36.7 74 18 119/64 (82) 99 Room Air  


 


5/11/21 20:02     98 Room Air  


 


5/11/21 19:34 36.8 80 18 106/66 (79) 98 Room Air  


 


5/11/21 15:50      Room Air  


 


5/11/21 15:41 36.5 70 18 130/88 (102) 100 Room Air  


 


5/11/21 15:28 36.5 78   100   21


 


5/11/21 15:00  84 16 134/78 98 Room Air  


 


5/11/21 12:10 36.5 78 16 127/85 (99) 100 Room Air  














I & O 


 


 5/12/21





 07:00


 


Intake Total 73469 ml


 


Balance 49798 ml





Capillary Refill : Less Than 3 Seconds


General Appearance:  No Apparent Distress, WD/WN


HEENT:  Moist Mucous Membranes


Respiratory:  Chest Non Tender, Lungs Clear, Normal Breath Sounds, No Accessory 

Muscle Use, No Respiratory Distress


Cardiovascular:  Regular Rate, Rhythm, No Murmur


Gastrointestinal:  normal bowel sounds, soft, no organomegaly, tenderness 

(Tenderness left lower quadrant left upper quadrant with voluntary guarding.  As

I was examining the patient while he was lying supine the patient had a spasm of

abdominal pain that brought him to his feet.)


Extremity:  No Calf Tenderness


Neurologic/Psychiatric:  Alert, Oriented x3, Normal Mood/Affect





Results


Lab


Laboratory Tests


5/11/21 12:30: 


White Blood Count 10.1, Red Blood Count 4.83, Hemoglobin 14.3, Hematocrit 42, 

Mean Corpuscular Volume 87, Mean Corpuscular Hemoglobin 30, Mean Corpuscular 

Hemoglobin Concent 34, Red Cell Distribution Width 12.8, Platelet Count 307, 

Mean Platelet Volume 8.8L, Immature Granulocyte % (Auto) 0, Neutrophils (%) 

(Auto) 76H, Lymphocytes (%) (Auto) 15, Monocytes (%) (Auto) 8, Eosinophils (%) 

(Auto) 0, Basophils (%) (Auto) 0, Neutrophils # (Auto) 7.7, Lymphocytes # (Auto)

1.5, Monocytes # (Auto) 0.8, Eosinophils # (Auto) 0.0, Basophils # (Auto) 0.0, 

Immature Granulocyte # (Auto) 0.0, Sodium Level 139, Potassium Level 4.4, 

Chloride Level 102, Carbon Dioxide Level 24, Anion Gap 13, Blood Urea Nitrogen 

17, Creatinine 0.89, Estimat Glomerular Filtration Rate > 60, BUN/Creatinine 

Ratio 19, Glucose Level 115H, Calcium Level 8.7


5/12/21 05:25: 


White Blood Count 5.9, Red Blood Count 3.52L, Hemoglobin 10.7#L, Hematocrit 31L,

Mean Corpuscular Volume 88, Mean Corpuscular Hemoglobin 30, Mean Corpuscular 

Hemoglobin Concent 34, Red Cell Distribution Width 13.0, Platelet Count 234, 

Mean Platelet Volume 8.9L, Immature Granulocyte % (Auto) 0, Neutrophils (%) 

(Auto) 59, Lymphocytes (%) (Auto) 29, Monocytes (%) (Auto) 10, Eosinophils (%) 

(Auto) 1, Basophils (%) (Auto) 0, Neutrophils # (Auto) 3.5, Lymphocytes # (Auto)

1.7, Monocytes # (Auto) 0.6, Eosinophils # (Auto) 0.1, Basophils # (Auto) 0.0, 

Immature Granulocyte # (Auto) 0.0





Assessment/Plan


Splenic Laceration - Grade II


Trauma Fall from 10ft


Anemia secondary to Splenic Lac - Hg dropped 14.1 to 10.7





Unvortunately Hg dropped a little more than I would have liked. Plan is to allow

pt to eat today and then NPO after midnight.  Will recheck hemoglobin in am.  I 

again went over options with pt:  1) watch and ck H/H (which is normal for Grade

1-3 splenic laceration)  


2) try to find a hospital that will do a coil to stop bleeding (might be too 

small a vessel for this) 3) Splenectomy  4) continue to monitor Hg and can 

transfuse if needed.  I think his best option right now is too repeat hemoglobin

and if tomorrow it is below 10 then we


will get a repeat CT.  He understood and wants to wait and see what hemoglobin 

level is in the am.











SAMIR COUGHLIN DO               May 12, 2021 11:57 none
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
## 2021-12-10 NOTE — ED PROVIDER NOTE - PATIENT PORTAL LINK FT
You can access the FollowMyHealth Patient Portal offered by [facility] by registering at the following website: [email or web address]. By joining [employer]’s FollowMyHealth portal, you will also be able to view your health information using other applications (apps) compatible with our system.
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
## 2021-12-11 NOTE — ED ADULT NURSE NOTE - CAS EDN DISCHARGE ASSESSMENT
Patient left the unit in stable condition accompanied with daughter./Alert and oriented to person, place and time/Awake

## 2022-01-20 ENCOUNTER — NON-APPOINTMENT (OUTPATIENT)
Age: 57
End: 2022-01-20

## 2022-01-21 ENCOUNTER — APPOINTMENT (OUTPATIENT)
Dept: NEUROLOGY | Facility: CLINIC | Age: 57
End: 2022-01-21

## 2022-01-21 NOTE — REASON FOR VISIT
[Home] : at home, [unfilled] , at the time of the visit. [Medical Office: ([facility])___] : at the medical office located in  [Verbal consent obtained from patient] : the patient, [unfilled] [Interpreters_IDNumber] : [id number] [Interpreters_FullName] : Vic [TWNoteComboBox1] : [place]

## 2022-01-21 NOTE — HISTORY OF PRESENT ILLNESS
[FreeTextEntry1] : Ms. Emilee Kimball is a 56 year-old woman with PMH __ who  presents for an appointment via Telehealth for evaluation of migraines. Verbal consent for Telehealth visit obtained from patient. \par \par Amitriptyline

## 2022-03-11 ENCOUNTER — APPOINTMENT (OUTPATIENT)
Dept: NEUROLOGY | Facility: CLINIC | Age: 57
End: 2022-03-11
Payer: MEDICAID

## 2022-03-11 VITALS
WEIGHT: 240 LBS | TEMPERATURE: 98.2 F | HEART RATE: 83 BPM | DIASTOLIC BLOOD PRESSURE: 79 MMHG | OXYGEN SATURATION: 97 % | SYSTOLIC BLOOD PRESSURE: 124 MMHG | HEIGHT: 66 IN | BODY MASS INDEX: 38.57 KG/M2

## 2022-03-11 PROCEDURE — 99203 OFFICE O/P NEW LOW 30 MIN: CPT

## 2022-03-11 RX ORDER — PHENTERMINE AND TOPIRAMATE 7.5; 46 MG/1; MG/1
7.5-46 CAPSULE, EXTENDED RELEASE ORAL
Refills: 0 | Status: DISCONTINUED | COMMUNITY
Start: 2021-10-27 | End: 2022-03-11

## 2022-03-11 RX ORDER — MIRTAZAPINE 7.5 MG/1
7.5 TABLET, FILM COATED ORAL
Refills: 0 | Status: DISCONTINUED | COMMUNITY
Start: 2021-10-27 | End: 2022-03-11

## 2022-03-11 NOTE — DISCUSSION/SUMMARY
[FreeTextEntry1] : Ms. Prado is a 56 year-old woman with PMH migraines who presents to the office today to establish care for migraines. Headaches are well managed with current medication regimen. She was counselled on s/s of serotonin syndrome, as combination of sertraline, amitriptyline and sumatriptan put her at risk. Continue amitriptyline 100mg QHS and sumatriptan 100mg PRN. Follow-up with me in six months or sooner should the need arise. All of her questions and concerns were addressed. \par

## 2022-03-11 NOTE — HISTORY OF PRESENT ILLNESS
[FreeTextEntry1] : Ms. Prado is a 56 year-old woman with PMH migraines who presents to the office today for evaluation of migraines. She reports history of migraines for over 20 years. Headaches begin as a throbbing sensation in the back of her eyes and radiate towards the back of her head. She has been taking a combination of sumatriptan 100mg PRN and amitriptyline 100mg QHS for at least 3 years which provides relief of symptoms. She denies any other new or concerning neurological symptoms. She denies signs/symptoms of serotonin syndrome. QTc 431. \par

## 2022-03-11 NOTE — REVIEW OF SYSTEMS
[Migraine Headache] : migraine headaches [Depression] : depression [Fever] : no fever [Chills] : no chills [Confused or Disoriented] : no confusion [Memory Lapses or Loss] : no memory loss [Decr. Concentrating Ability] : no decrease in concentrating ability [Difficulty with Language] : no ~M difficulty with language [Facial Weakness] : no facial weakness [Arm Weakness] : no arm weakness [Hand Weakness] : no hand weakness [Leg Weakness] : no leg weakness [Poor Coordination] : good coordination [Numbness] : no numbness [Tingling] : no tingling [Seizures] : no convulsions [Dizziness] : no dizziness [Lightheadedness] : no lightheadedness [Difficulty Walking] : no difficulty walking [Frequent Falls] : not falling [Anxiety] : no anxiety [Eye Pain] : no eye pain [Eyesight Problems] : no eyesight problems [Earache] : no earache [Loss Of Hearing] : no hearing loss [Chest Pain] : no chest pain [Palpitations] : no palpitations [Cough] : no cough [SOB on Exertion] : no shortness of breath during exertion

## 2022-03-16 ENCOUNTER — APPOINTMENT (OUTPATIENT)
Dept: FAMILY MEDICINE | Facility: CLINIC | Age: 57
End: 2022-03-16
Payer: MEDICAID

## 2022-03-16 VITALS
RESPIRATION RATE: 16 BRPM | SYSTOLIC BLOOD PRESSURE: 126 MMHG | BODY MASS INDEX: 38.73 KG/M2 | TEMPERATURE: 98.2 F | OXYGEN SATURATION: 98 % | HEART RATE: 84 BPM | WEIGHT: 241 LBS | DIASTOLIC BLOOD PRESSURE: 70 MMHG | HEIGHT: 66 IN

## 2022-03-16 DIAGNOSIS — G47.33 OBSTRUCTIVE SLEEP APNEA (ADULT) (PEDIATRIC): ICD-10-CM

## 2022-03-16 PROCEDURE — 99214 OFFICE O/P EST MOD 30 MIN: CPT

## 2022-03-16 NOTE — REVIEW OF SYSTEMS
[Lower Ext Edema] : lower extremity edema [Incontinence] : incontinence [Dysuria] : no dysuria [Nocturia] : no nocturia [Poor Libido] : libido not poor [Hematuria] : no hematuria [Frequency] : no frequency [Vaginal Discharge] : no vaginal discharge [Dysmenorrhea] : no dysmenorrhea

## 2022-03-16 NOTE — ASSESSMENT
[FreeTextEntry1] : \par HCM;\par -10/2021\par Mammo: 11/2021\par Colon cancer screening: FOBT negative 10/2021\par Gyn: hx total hysterectomy\par Immunizations: refused flu shot\par \par Chronic Migraines:\par -On amitriptyline 100mg\par -F/up with Neurology\par \par Anxiety-Depression:\par -On Fluoxetine.\par -States is not seen by psychiatry PA due to insurance coevrage\par \par S/P Bariatric surgery/ Obesity:\par -On Qsymia\par \par Urinary Incontinence/ hesitance:\par -Advise urology eval> on hold\par \par B/L Ankle edema;\par -Vascular sx referral.\par \par F/up in 6 months\par \par

## 2022-03-16 NOTE — HISTORY OF PRESENT ILLNESS
[FreeTextEntry1] : Med refills\par urinary hesitance\par Ankle swelling [de-identified] : 57 y/o HF originally from [place], presents today for meds refills.\par Previous PCP: Dr Saskia Dsouza\par Pt with medical hx significant for Anxiety-depression on Fluoxetine seen in the past by psychiatry, PA: aileen Grijalva.\par She has a long hx MIgraines and takes Amitriptyline. Seen by Neurology.\par She has EBRTHA on CPAP.\par She has a hx bariatric surgery on 2019, now on Qsymia.\par She states is under PT for Sciatic pain. \par Reports urinary hesitance, and previous Incontinence for long time. denies dysuria, pain with voiding.\par reports B/L ankle edema for long time.\par

## 2022-03-16 NOTE — PHYSICAL EXAM
[Normal] : normal rate, regular rhythm, normal S1 and S2 and no murmur heard [___ +] : bilateral [unfilled]U+ pitting edema to the ankles

## 2022-06-01 ENCOUNTER — APPOINTMENT (OUTPATIENT)
Dept: VASCULAR SURGERY | Facility: CLINIC | Age: 57
End: 2022-06-01
Payer: MEDICAID

## 2022-06-01 VITALS
HEART RATE: 78 BPM | BODY MASS INDEX: 38.73 KG/M2 | DIASTOLIC BLOOD PRESSURE: 81 MMHG | OXYGEN SATURATION: 96 % | SYSTOLIC BLOOD PRESSURE: 128 MMHG | TEMPERATURE: 98.7 F | WEIGHT: 241 LBS | HEIGHT: 66 IN

## 2022-06-01 PROCEDURE — 93970 EXTREMITY STUDY: CPT

## 2022-06-01 PROCEDURE — 99203 OFFICE O/P NEW LOW 30 MIN: CPT

## 2022-06-06 NOTE — PROCEDURE
[FreeTextEntry1] : Studies: \par 6/1/22 BLE venous duplex: \par right: no insufficiency in GSV or SSV. No DVT\par left: no insufficiency in GSV or SSV. No DVT

## 2022-06-06 NOTE — ASSESSMENT
[FreeTextEntry1] : 55 yo female with BLE edema with pain in lower legs. Venous duplex today demonstrates no insufficiency in GSV or SSV. No DVT. \par \par Pt counseled on results of duplex and above diagnosis.\par BLE UNNA boot applied\par Will refer to tactile for lymphedema massage device\par Pt counseled on importance of walking daily for exercise and low sodium diet\par RTC in 1 week for bandage change\par \par A total of 35 minutes was spent with patient and coordinating care\par

## 2022-06-06 NOTE — HISTORY OF PRESENT ILLNESS
[FreeTextEntry1] : New 55 yo female PMHx depression, sleep apnea, presents with swelling in lower legs bilaterally with pain. Pt feels legs become more swollen towards the end of the day. She denies any weeping from legs, open wounds, calf claudication, fever or chills.

## 2022-06-06 NOTE — PHYSICAL EXAM
[2+] : left 2+ [Ankle Swelling (On Exam)] : present [Ankle Swelling Bilaterally] : bilaterally  [Ankle Swelling On The Left] : moderate [Alert] : alert [Oriented to Person] : oriented to person [Oriented to Place] : oriented to place [Oriented to Time] : oriented to time [Calm] : calm [Varicose Veins Of Lower Extremities] : not present [] : not present [Skin Ulcer] : no ulcer [de-identified] : NAD. Well appearing

## 2022-06-21 ENCOUNTER — APPOINTMENT (OUTPATIENT)
Dept: NEUROLOGY | Facility: CLINIC | Age: 57
End: 2022-06-21

## 2022-06-21 ENCOUNTER — APPOINTMENT (OUTPATIENT)
Dept: VASCULAR SURGERY | Facility: CLINIC | Age: 57
End: 2022-06-21

## 2022-06-30 ENCOUNTER — APPOINTMENT (OUTPATIENT)
Dept: PHYSICAL MEDICINE AND REHAB | Facility: CLINIC | Age: 57
End: 2022-06-30

## 2022-07-12 NOTE — ED ADULT NURSE NOTE - EENT WDL
"Subjective:       Patient ID: Noah Lopez is a 75 y.o. male.    Chief Complaint: Bedoya exchange (16 fr)  This is a 75 y.o.  male patient that is an established patient of mine.     The patient was referred to me by Dr. De La Torre for urinary retention. Dr. De La Torre obtained routine bloodwork in 12/2021. His Cr was elevated from baseline. He then ordered a renal US 1/4/22 - marked distention of bladder, no hydronephrosis bilaterally, enlarged prostate vol 197g, and pt presented to ER per his recommendation on for a bedoya placement.   ER visit 1/4/22 - 3600 cc of urine from his Bedoya. Was not started on medications.      He believes that he has a "truckers" bladder and that he's used to tightening up and hold it in for a long time. He feels that his "sphincter" has relaxed more.     Started him on flomax and finasteride 1 week ago. Pt wanted a voiding trial this week.     He failed a voiding trial. Worked up with cysto and UDS.  Cysto -Trilobar hypertrophy of the prostate, mildly enlarged median lobe, significantly enlarged lateral lobes  UDs with Dr. Maloney on 1/31/22 - see his report for full details -   Pdet at Max Flow: 0 cmH2O    EMG Storage: Normal Recruitment             EMG Voiding: Flaring with valsalva     Impression:           Sensation:Normal     Capacity: Large     Compliance:Normal     Detrusor Overactivity:Absent     Continence: No Incontinence     Contractility: Hypocontractility     Emptying:Unsatisfactory - Hypocontractility     Coordination:Dysfunctional (Valsalva) Voiding      He was having a bad reaction to Bactrim sent by Dr. De La Torre. Did not finish course.  Pt was not interested in a surgery. He felt that he "did this to myself." and with the results of the urodynamics with no detrusor pressure and valsalva voiding, an outlet reducing procedure would have a low chance of success. The patient states he is mostly interested in bedoya management and he does not want a surgery at this time. "     3/24/22  Here today for bedoya assistance. Pt went to ER and did not know how to disconnect bag    6/13/22  Pt notes cloudy urine and suprapubic discomfort. Also constipated. PSA was 17 5/25/22 and checked 2 days after a bedoya exchange. Patient notes he does not really drink many fluids from 9am-9pm.     7/12/22  I treated last UTI with ceftin x 10 days. I also recommended that he start augmentin the day before, the day of and the day after bedoya exchanges to guard against infections. He informed me that he did not do that.     LAST PSA  Lab Results   Component Value Date    PSA 11.7 (H) 12/08/2021    PSADIAG 17.3 (H) 05/25/2022    PSATOTAL 15.8 (H) 07/05/2022    PSAFREE 2.83 (H) 07/05/2022       Lab Results   Component Value Date    CREATININE 1.3 07/06/2022       ---  Past Medical History:   Diagnosis Date    Colon polyp 09/06/2018    Hyperlipidemia     Hypertension        Past Surgical History:   Procedure Laterality Date    COLONOSCOPY  2010    COLONOSCOPY N/A 9/6/2018    Procedure: COLONOSCOPY Suprep PLEASE TEXT PATIENT WITH ARRIVAL TIME;  Surgeon: Niharika Arce MD;  [facility];  Service: Endoscopy;  Laterality: N/A;    CYSTOSCOPY WITH URODYNAMIC TESTING N/A 1/31/2022    Procedure: CYSTOSCOPY, WITH URODYNAMIC TESTING FLOUROSCOPIC;  Surgeon: Anthony Maloney MD;  Location: [address];  Service: Urology;  Laterality: N/A;  1hr    KNEE ARTHROSCOPY      SPINE SURGERY      l-spine       Family History   Problem Relation Age of Onset    Hypertension Mother     Heart attack Father     Cirrhosis Father     No Known Problems Brother     Seizures Daughter     No Known Problems Son     No Known Problems Brother     Liver disease Daughter        Social History     Tobacco Use    Smoking status: Former Smoker    Smokeless tobacco: Never Used   Substance Use Topics    Alcohol use: Yes     Comment: occ    Drug use: Yes     Types: Marijuana       Current Outpatient Medications on  File Prior to Visit   Medication Sig Dispense Refill    ascorbic acid, vitamin C, (VITAMIN C) 1000 MG tablet Take 1,000 mg by mouth once daily.      clotrimazole-betamethasone 1-0.05% (LOTRISONE) cream Apply topically 2 (two) times daily. 60 g 3    diltiaZEM (CARDIZEM CD) 300 MG 24 hr capsule TAKE 1 CAPSULE BY MOUTH  ONCE DAILY 90 capsule 3    diphenhydramine HCl (BENADRYL ALLERGY ORAL) Take by mouth.      docusate sodium (COLACE) 250 MG capsule Take 250 mg by mouth once daily.      finasteride (PROSCAR) 5 mg tablet Take 1 tablet (5 mg total) by mouth once daily. 90 tablet 3    lisinopriL (PRINIVIL,ZESTRIL) 20 MG tablet TAKE 1 TABLET BY MOUTH ONCE DAILY 90 tablet 3    melatonin 10 mg TbDL Take by mouth.      multivitamin capsule Take 1 capsule by mouth once daily.      ofloxacin (FLOXIN) 0.3 % otic solution Apply 10 drops into right ear once a day for 10 days.  Please dispense generic.      omega-3 fatty acids/fish oil (FISH OIL-OMEGA-3 FATTY ACIDS) 300-1,000 mg capsule Take 1 capsule by mouth once daily.      simvastatin (ZOCOR) 20 MG tablet TAKE 1 TABLET BY MOUTH IN  THE EVENING 90 tablet 3    tamsulosin (FLOMAX) 0.4 mg Cap Take 1 capsule (0.4 mg total) by mouth once daily. 90 capsule 3    traZODone (DESYREL) 100 MG tablet TAKE 2 TABLETS BY MOUTH EVERY DAY AT BEDTIME AS NEEDED FOR INSOMNIA 180 tablet 3    TURMERIC ORAL Take by mouth.      wheat dextrin (BENEFIBER CLEAR SF, DEXTRIN, ORAL) Take by mouth.      cholecalciferol, vitamin D3, (VITAMIN D3 ORAL) Take 125 mcg by mouth.      GAS RELIEF 80, SIMETHICONE, ORAL Take by mouth.      triamterene-hydrochlorothiazide 37.5-25 mg (DYAZIDE) 37.5-25 mg per capsule TAKE 1 CAPSULE BY MOUTH IN  THE MORNING 90 capsule 3    zinc gluconate 50 mg tablet Take 50 mg by mouth once daily.       No current facility-administered medications on file prior to visit.       Review of patient's allergies indicates:   Allergen Reactions    Ciprofloxacin Other (See  Comments)     tendonitis    Sulfa (sulfonamide antibiotics) Nausea And Vomiting       Review of Systems   Constitutional: Negative for chills.   HENT: Negative for congestion.    Eyes: Negative for visual disturbance.   Respiratory: Negative for shortness of breath.    Cardiovascular: Negative for chest pain.   Gastrointestinal: Negative for abdominal distention.   Musculoskeletal: Negative for gait problem.   Skin: Negative for color change.   Neurological: Negative for dizziness.   Psychiatric/Behavioral: Negative for agitation.       Objective:      Physical Exam  Constitutional:       Appearance: He is well-developed.   HENT:      Head: Normocephalic.   Eyes:      Pupils: Pupils are equal, round, and reactive to light.   Pulmonary:      Effort: Pulmonary effort is normal.   Abdominal:      Palpations: Abdomen is soft.   Musculoskeletal:         General: Normal range of motion.      Cervical back: Normal range of motion.   Skin:     General: Skin is warm and dry.   Neurological:      Mental Status: He is alert.         Assessment:       1. Recurrent UTI        Plan:         Plan:  1. Offered to recheck a PSA in 4 weeks as his last PSA was checked 2 days after a bedoya exchange. He does not want to recheck. He wanted to proceed with prostate biopsy.   2. Bedoya exchange today and sent urine for culture. Last culture treated with ceftin but he did not try the augmentin around bedoya catheter exchanges as I recommended.   3. Pt would like to proceed with prostate bx. Discussed the psa on 5/25/22 was checked 2 days after a bedoya change and this could falsely drive a psa high but he would like to proceed with a biopsy to further investigate as he states he had an elevated PSA in 12/2021. After we appropriately treat his presumed UTI, will send in keflex, fleets and have staff schedule prostate biopsy. Will see what this culture grows out and likely will place patient on antibiotic treatment and then daily suppressive  antibiotics while we accomplish his prostate biopsy.   4. Jones exchange and urine collection performed by clinic staff.         Recurrent UTI  -     Urine culture       Eyes with no visual disturbances.  Ears clean and dry and no hearing difficulties. Nose with pink mucosa and no drainage.  Mouth mucous membranes moist and pink.  No tenderness or swelling to throat or neck.

## 2022-08-11 ENCOUNTER — APPOINTMENT (OUTPATIENT)
Dept: NEUROLOGY | Facility: CLINIC | Age: 57
End: 2022-08-11

## 2022-12-21 NOTE — ED STATDOCS - NS_ ATTENDINGSCRIBEDETAILS _ED_A_ED_FT
[FreeTextEntry2] : right foot/ankle
I performed the initial face to face bedside interview with this patient regarding history of present illness, review of symptoms and past medical, social and family history.  I completed an independent physical examination.  I was the initial provider who evaluated this patient.  The history, review of symptoms and examination was documented by the scribe in my presence and I attest to the accuracy of the documentation.  I have signed out the follow up of any pending tests (i.e. labs, radiological studies) to the PA.  I have discussed the patient’s plan of care and disposition with the PA.

## 2023-05-03 ENCOUNTER — APPOINTMENT (OUTPATIENT)
Dept: FAMILY MEDICINE | Facility: CLINIC | Age: 58
End: 2023-05-03
Payer: MEDICAID

## 2023-05-03 VITALS
BODY MASS INDEX: 40.72 KG/M2 | SYSTOLIC BLOOD PRESSURE: 130 MMHG | HEIGHT: 66 IN | TEMPERATURE: 98.2 F | DIASTOLIC BLOOD PRESSURE: 72 MMHG | OXYGEN SATURATION: 96 % | WEIGHT: 253.38 LBS | RESPIRATION RATE: 16 BRPM | HEART RATE: 82 BPM

## 2023-05-03 DIAGNOSIS — Z12.39 ENCOUNTER FOR OTHER SCREENING FOR MALIGNANT NEOPLASM OF BREAST: ICD-10-CM

## 2023-05-03 DIAGNOSIS — Z12.11 ENCOUNTER FOR SCREENING FOR MALIGNANT NEOPLASM OF COLON: ICD-10-CM

## 2023-05-03 PROCEDURE — 99214 OFFICE O/P EST MOD 30 MIN: CPT

## 2023-05-03 RX ORDER — RIBOFLAVIN (VITAMIN B2) 100 MG
100 TABLET ORAL
Refills: 0 | Status: DISCONTINUED | COMMUNITY
End: 2023-05-03

## 2023-05-03 RX ORDER — OMEGA-3/DHA/EPA/FISH OIL 300-1000MG
CAPSULE ORAL
Refills: 0 | Status: DISCONTINUED | COMMUNITY
End: 2023-05-03

## 2023-05-03 RX ORDER — FAMOTIDINE 40 MG/1
40 TABLET, FILM COATED ORAL
Refills: 0 | Status: DISCONTINUED | COMMUNITY
Start: 2021-10-27 | End: 2023-05-03

## 2023-05-03 RX ORDER — FUROSEMIDE 20 MG/1
20 TABLET ORAL DAILY
Qty: 30 | Refills: 0 | Status: DISCONTINUED | COMMUNITY
Start: 2022-06-01 | End: 2023-05-03

## 2023-05-03 RX ORDER — ERGOCALCIFEROL 1.25 MG/1
1.25 MG CAPSULE, LIQUID FILLED ORAL
Refills: 0 | Status: DISCONTINUED | COMMUNITY
End: 2023-05-03

## 2023-05-03 RX ORDER — PHENTERMINE AND TOPIRAMATE 7.5; 46 MG/1; MG/1
7.5-46 CAPSULE, EXTENDED RELEASE ORAL
Refills: 0 | Status: DISCONTINUED | COMMUNITY
End: 2023-05-03

## 2023-05-03 NOTE — ASSESSMENT
[FreeTextEntry1] : \par HCM;\par -10/2021\par Mammo: 11/2021> referral\par Colon cancer screening: FOBT negative 10/2021> referral\par Gyn: hx total hysterectomy\par Immunizations: refused flu shot\par DEXA referral\par \par Chronic Migraines:\par -On amitriptyline 100mg\par -F/up with Neurology\par \par Anxiety-Depression:\par -On Fluoxetine.\par \par S/P Bariatric surgery/ Obesity:/ PRE DM\par -Off Qsymia\par -Rx WEgovy\par \par \par B/L Ankle edema;\par -Vascular sx eval appreciated\par -Lasix 40 mg PRN\par \par F/up in 3 months\par \par

## 2023-05-03 NOTE — HISTORY OF PRESENT ILLNESS
[Other: _____] : [unfilled] [FreeTextEntry1] : Lab results. [de-identified] : 58 y/o HF originally from [place], presents today for meds refills. Not seen > 1 year.\par She states had blood tests done with Psychiatry on 4/28/23 and was told Cholesterol was elevated and Vit D was low.\par Previous PCP: Dr Saskia Dsouza\par Pt with medical hx significant for Anxiety-depression on Fluoxetine seenby psychiatry, PA: aileen Grijalva.\par She has a long hx MIgraines and takes Amitriptyline. Seen by Neurology.\par She has BERTHA on CPAP.\par She has a hx bariatric surgery on 2019, now oFF Qsymia.\par reports B/L ankle edema for long time.\par

## 2023-05-18 ENCOUNTER — RESULT REVIEW (OUTPATIENT)
Age: 58
End: 2023-05-18

## 2023-05-18 ENCOUNTER — APPOINTMENT (OUTPATIENT)
Dept: RADIOLOGY | Facility: CLINIC | Age: 58
End: 2023-05-18
Payer: MEDICAID

## 2023-05-18 ENCOUNTER — OUTPATIENT (OUTPATIENT)
Dept: OUTPATIENT SERVICES | Facility: HOSPITAL | Age: 58
LOS: 1 days | End: 2023-05-18
Payer: MEDICAID

## 2023-05-18 ENCOUNTER — APPOINTMENT (OUTPATIENT)
Dept: MAMMOGRAPHY | Facility: CLINIC | Age: 58
End: 2023-05-18
Payer: MEDICAID

## 2023-05-18 DIAGNOSIS — Z13.820 ENCOUNTER FOR SCREENING FOR OSTEOPOROSIS: ICD-10-CM

## 2023-05-18 DIAGNOSIS — Z12.31 ENCOUNTER FOR SCREENING MAMMOGRAM FOR MALIGNANT NEOPLASM OF BREAST: ICD-10-CM

## 2023-05-18 DIAGNOSIS — Z00.8 ENCOUNTER FOR OTHER GENERAL EXAMINATION: ICD-10-CM

## 2023-05-18 PROCEDURE — 77085 DXA BONE DENSITY AXL VRT FX: CPT | Mod: 26

## 2023-05-18 PROCEDURE — 77063 BREAST TOMOSYNTHESIS BI: CPT | Mod: 26

## 2023-05-18 PROCEDURE — 77085 DXA BONE DENSITY AXL VRT FX: CPT

## 2023-05-18 PROCEDURE — 77067 SCR MAMMO BI INCL CAD: CPT

## 2023-05-18 PROCEDURE — 77063 BREAST TOMOSYNTHESIS BI: CPT

## 2023-05-18 PROCEDURE — 77067 SCR MAMMO BI INCL CAD: CPT | Mod: 26

## 2023-05-24 ENCOUNTER — NON-APPOINTMENT (OUTPATIENT)
Age: 58
End: 2023-05-24

## 2023-06-01 LAB — HEMOCCULT STL QL IA: NEGATIVE

## 2023-09-28 ENCOUNTER — APPOINTMENT (OUTPATIENT)
Dept: FAMILY MEDICINE | Facility: CLINIC | Age: 58
End: 2023-09-28

## 2024-01-17 ENCOUNTER — RX RENEWAL (OUTPATIENT)
Age: 59
End: 2024-01-17

## 2024-01-24 ENCOUNTER — APPOINTMENT (OUTPATIENT)
Dept: OPHTHALMOLOGY | Facility: CLINIC | Age: 59
End: 2024-01-24

## 2024-02-13 ENCOUNTER — APPOINTMENT (OUTPATIENT)
Dept: FAMILY MEDICINE | Facility: CLINIC | Age: 59
End: 2024-02-13

## 2024-02-27 ENCOUNTER — NON-APPOINTMENT (OUTPATIENT)
Age: 59
End: 2024-02-27

## 2024-02-27 ENCOUNTER — APPOINTMENT (OUTPATIENT)
Dept: FAMILY MEDICINE | Facility: CLINIC | Age: 59
End: 2024-02-27
Payer: MEDICAID

## 2024-02-27 VITALS
DIASTOLIC BLOOD PRESSURE: 84 MMHG | OXYGEN SATURATION: 97 % | BODY MASS INDEX: 38.89 KG/M2 | RESPIRATION RATE: 14 BRPM | SYSTOLIC BLOOD PRESSURE: 134 MMHG | HEART RATE: 80 BPM | WEIGHT: 242 LBS | HEIGHT: 66 IN

## 2024-02-27 VITALS
BODY MASS INDEX: 39.06 KG/M2 | SYSTOLIC BLOOD PRESSURE: 130 MMHG | DIASTOLIC BLOOD PRESSURE: 70 MMHG | OXYGEN SATURATION: 99 % | RESPIRATION RATE: 14 BRPM | HEIGHT: 66 IN | HEART RATE: 80 BPM

## 2024-02-27 DIAGNOSIS — M25.471 EFFUSION, RIGHT ANKLE: ICD-10-CM

## 2024-02-27 DIAGNOSIS — M25.472 EFFUSION, RIGHT ANKLE: ICD-10-CM

## 2024-02-27 DIAGNOSIS — F41.8 OTHER SPECIFIED ANXIETY DISORDERS: ICD-10-CM

## 2024-02-27 DIAGNOSIS — R73.03 PREDIABETES.: ICD-10-CM

## 2024-02-27 PROCEDURE — 93000 ELECTROCARDIOGRAM COMPLETE: CPT

## 2024-02-27 PROCEDURE — 83036 HEMOGLOBIN GLYCOSYLATED A1C: CPT | Mod: QW

## 2024-02-27 PROCEDURE — 99214 OFFICE O/P EST MOD 30 MIN: CPT | Mod: 25

## 2024-02-27 RX ORDER — SEMAGLUTIDE 0.25 MG/.5ML
0.25 INJECTION, SOLUTION SUBCUTANEOUS
Qty: 1 | Refills: 0 | Status: DISCONTINUED | COMMUNITY
Start: 2023-05-03 | End: 2024-02-27

## 2024-02-27 NOTE — REVIEW OF SYSTEMS
[Lower Ext Edema] : lower extremity edema [Incontinence] : incontinence [Dysuria] : no dysuria [Nocturia] : no nocturia [Hematuria] : no hematuria [Poor Libido] : libido not poor [Frequency] : no frequency [Vaginal Discharge] : no vaginal discharge [Dysmenorrhea] : no dysmenorrhea

## 2024-02-27 NOTE — ASSESSMENT
[FreeTextEntry1] : HCM; -10/2021 Mammo: 05/2023 Colon cancer screening: FOBT negative 05/2023 Gyn: hx total hysterectomy Immunizations: refused flu shot DEXA 05/23 Normal  Chronic Migraines: -On amitriptyline 100mg -F/up with Neurology  Anxiety-Depression: -On Fluoxetine.  S/P Bariatric surgery/ Obesity:/ PRE DM (HbA1c: 6.1%) -Off Qsymia -Meds not cover by insurance. -Advise Sx re eval.   B/L Ankle edema; -Vascular sx eval appreciated -Lasix 40 mg PRN  F/up in 3 months

## 2024-02-27 NOTE — HISTORY OF PRESENT ILLNESS
[Other: _____] : [unfilled] [FreeTextEntry1] : Lab results. [de-identified] : 59 y/o HF originally from [place], presents today for meds refills. Not seen in several months. Pt seen by Psychiatry for Depression-Anxiety. Previous PCP: Dr Saskia Dsouza Pt with medical hx significant for Anxiety-depression on Fluoxetine seen by psychiatry, PA: aileen Grijalva. She has a long hx MIgraines and takes Amitriptyline. Seen by Neurology. She has BERTHA on CPAP. She has a hx bariatric surgery in 2019. She gain all weight back> Bariatric sx Dr Myers in [facility]. Meds for weight loss not cover by insurance. reports B/L ankle edema for long time. She is requesting letter for work due to ankle edema and migraines increased with noise exposure.

## 2024-02-28 LAB
ANION GAP SERPL CALC-SCNC: 11 MMOL/L
BUN SERPL-MCNC: 20 MG/DL
CALCIUM SERPL-MCNC: 9.5 MG/DL
CHLORIDE SERPL-SCNC: 103 MMOL/L
CHOLEST SERPL-MCNC: 229 MG/DL
CO2 SERPL-SCNC: 28 MMOL/L
CREAT SERPL-MCNC: 0.67 MG/DL
EGFR: 101 ML/MIN/1.73M2
GLUCOSE SERPL-MCNC: 106 MG/DL
HDLC SERPL-MCNC: 53 MG/DL
LDLC SERPL CALC-MCNC: 160 MG/DL
NONHDLC SERPL-MCNC: 176 MG/DL
POTASSIUM SERPL-SCNC: 4.4 MMOL/L
SODIUM SERPL-SCNC: 142 MMOL/L
TRIGL SERPL-MCNC: 91 MG/DL

## 2024-03-01 ENCOUNTER — APPOINTMENT (OUTPATIENT)
Dept: NEUROLOGY | Facility: CLINIC | Age: 59
End: 2024-03-01
Payer: MEDICAID

## 2024-03-01 VITALS
WEIGHT: 240 LBS | HEIGHT: 66 IN | SYSTOLIC BLOOD PRESSURE: 118 MMHG | DIASTOLIC BLOOD PRESSURE: 74 MMHG | BODY MASS INDEX: 38.57 KG/M2

## 2024-03-01 DIAGNOSIS — G43.909 MIGRAINE, UNSPECIFIED, NOT INTRACTABLE, W/OUT STATUS MIGRAINOSUS: ICD-10-CM

## 2024-03-01 DIAGNOSIS — Z82.0 FAMILY HISTORY OF EPILEPSY AND OTHER DISEASES OF THE NERVOUS SYSTEM: ICD-10-CM

## 2024-03-01 PROCEDURE — G2211 COMPLEX E/M VISIT ADD ON: CPT | Mod: NC,1L

## 2024-03-01 PROCEDURE — 99215 OFFICE O/P EST HI 40 MIN: CPT

## 2024-03-01 RX ORDER — AMITRIPTYLINE HYDROCHLORIDE 100 MG/1
100 TABLET, FILM COATED ORAL
Qty: 90 | Refills: 1 | Status: ACTIVE | COMMUNITY
Start: 1900-01-01 | End: 1900-01-01

## 2024-03-01 RX ORDER — TRAZODONE HYDROCHLORIDE 50 MG/1
50 TABLET ORAL
Refills: 0 | Status: ACTIVE | COMMUNITY

## 2024-03-01 RX ORDER — SUMATRIPTAN 100 MG/1
100 TABLET, FILM COATED ORAL
Qty: 27 | Refills: 1 | Status: ACTIVE | COMMUNITY
Start: 2022-03-11 | End: 1900-01-01

## 2024-03-01 NOTE — PHYSICAL EXAM
[General Appearance - Alert] : alert [General Appearance - In No Acute Distress] : in no acute distress [Oriented To Time, Place, And Person] : oriented to person, place, and time [Affect] : the affect was normal [Memory Remote] : remote memory was not impaired [Memory Recent] : recent memory was not impaired [Cranial Nerves Oculomotor (III)] : extraocular motion intact [Cranial Nerves Optic (II)] : visual acuity intact bilaterally,  visual fields full to confrontation, pupils equal round and reactive to light [Cranial Nerves Facial (VII)] : face symmetrical [Cranial Nerves Trigeminal (V)] : facial sensation intact symmetrically [Cranial Nerves Vestibulocochlear (VIII)] : hearing was intact bilaterally [Cranial Nerves Glossopharyngeal (IX)] : tongue and palate midline [Cranial Nerves Accessory (XI - Cranial And Spinal)] : head turning and shoulder shrug symmetric [Motor Tone] : muscle tone was normal in all four extremities [Cranial Nerves Hypoglossal (XII)] : there was no tongue deviation with protrusion [Motor Strength] : muscle strength was normal in all four extremities [Sensation Tactile Decrease] : light touch was intact [Sensation Vibration Decrease] : vibration was intact [Sensation Pain / Temperature Decrease] : pain and temperature was intact [Abnormal Walk] : normal gait [2+] : Patella left 2+ [Optic Disc Abnormality] : the optic disc were normal in size and color [Dysarthria] : no dysarthria [Aphasia] : no dysphasia/aphasia [Romberg's Sign] : Romberg's sign was negtive [Coordination - Dysmetria Impaired Finger-to-Nose Bilateral] : not present [Plantar Reflex Right Only] : normal on the right [Plantar Reflex Left Only] : normal on the left

## 2024-03-01 NOTE — ASSESSMENT
[FreeTextEntry1] : This is a 58-year-old woman with a history of migraines for greater than 20 years. She reports having had imaging 6 or 7 years ago but does not know where it was done. I will obtain an MRI of the brain.  I have explained that there are essentially 2 strategies for dealing with chronic headaches.  The first is abortive medication of which there are numerous over-the-counter preparations as well as prescription options.  The second strategy is prophylactic medications.  I have explained that these are medications which prevent headaches when taken on a regular basis.  I have explained that there are several medications which have been found to be preventative against headaches.  I have further explained that none of the medications have an immediate effect.  They must build up in the system over a few weeks.  Most people notice that within a few weeks on the medication, the headache intensity is diminishing.  Within a few more weeks most people begin to notice that the frequency is decreasing as well.  I have explained that the preventive medications were all originally used for other conditions but were also found to work against chronic headaches.  The patient seemed to understand my explanation.  She had good response to amitriptyline in the past. I will restart her previous dosage. I will resume sumatriptan tablets to be used as needed as well.  I have discussed the potential side effects of the medication with the patient and have advised the patient to call me should any new symptoms occur.  I will see her back in 6 months.

## 2024-03-01 NOTE — HISTORY OF PRESENT ILLNESS
Patient noted a small abscess to the inside of her left thigh approximately 2 days ago.  State that it has been draining a purulent drainage.  The redness around has actually decreased.  Denies fevers or chills.  History of staph infection.   
[FreeTextEntry1] : I saw this patient in the office today. She presents with her daughter.  She has a long history of migraines for many years. She now has daily headache which waxes and wanes in intensity. When severe it is associated with nausea, photophobia, and sound sensitivity. She had been on amitriptyline in the past with good improvement in the headaches. She had used sumatriptan for acute exacerbations with good improvement.  She reports that she is out of both medications.

## 2024-03-01 NOTE — CONSULT LETTER
[Dear  ___] : Dear  [unfilled], [Courtesy Letter:] : I had the pleasure of seeing your patient, [unfilled], in my office today. [Consult Closing:] : Thank you very much for allowing me to participate in the care of this patient.  If you have any questions, please do not hesitate to contact me. [Please see my note below.] : Please see my note below. [Sincerely,] : Sincerely, [FreeTextEntry3] : Reji Burks MD.

## 2024-03-08 ENCOUNTER — OFFICE (OUTPATIENT)
Dept: URBAN - METROPOLITAN AREA CLINIC 112 | Facility: CLINIC | Age: 59
Setting detail: OPHTHALMOLOGY
End: 2024-03-08
Payer: COMMERCIAL

## 2024-03-08 DIAGNOSIS — H10.45: ICD-10-CM

## 2024-03-08 DIAGNOSIS — H43.393: ICD-10-CM

## 2024-03-08 DIAGNOSIS — H25.13: ICD-10-CM

## 2024-03-08 DIAGNOSIS — H11.152: ICD-10-CM

## 2024-03-08 PROCEDURE — 99203 OFFICE O/P NEW LOW 30 MIN: CPT | Performed by: REGISTERED NURSE

## 2024-03-08 PROCEDURE — 92250 FUNDUS PHOTOGRAPHY W/I&R: CPT | Performed by: REGISTERED NURSE

## 2024-03-08 ASSESSMENT — REFRACTION_CURRENTRX
OS_VPRISM_DIRECTION: PROGS
OD_VPRISM_DIRECTION: PROGS
OD_OVR_VA: 20/
OD_ADD: +2.00
OD_SPHERE: +2.50
OS_AXIS: 007
OS_ADD: +2.00
OD_AXIS: 011
OD_CYLINDER: -1.25
OS_CYLINDER: -0.25
OS_OVR_VA: 20/
OS_SPHERE: +2.25

## 2024-03-22 ENCOUNTER — RX ONLY (RX ONLY)
Age: 59
End: 2024-03-22

## 2024-03-22 ENCOUNTER — OFFICE (OUTPATIENT)
Dept: URBAN - METROPOLITAN AREA CLINIC 94 | Facility: CLINIC | Age: 59
Setting detail: OPHTHALMOLOGY
End: 2024-03-22
Payer: COMMERCIAL

## 2024-03-22 DIAGNOSIS — H04.122: ICD-10-CM

## 2024-03-22 DIAGNOSIS — H04.121: ICD-10-CM

## 2024-03-22 DIAGNOSIS — H11.042: ICD-10-CM

## 2024-03-22 PROBLEM — H10.45 ALLERGIC CONJUNCTIVITIS ; BOTH EYES: Status: ACTIVE | Noted: 2024-03-08

## 2024-03-22 PROBLEM — H25.13 CATARACT SENILE NUCLEAR SCLEROSIS; BOTH EYES: Status: ACTIVE | Noted: 2024-03-08

## 2024-03-22 PROBLEM — H11.152 PINGUECULA; LEFT EYE: Status: ACTIVE | Noted: 2024-03-08

## 2024-03-22 PROBLEM — H43.393 VITREOUS FLOATERS; BOTH EYES: Status: ACTIVE | Noted: 2024-03-08

## 2024-03-22 PROCEDURE — 83861 MICROFLUID ANALY TEARS: CPT | Mod: LT | Performed by: OPHTHALMOLOGY

## 2024-03-22 PROCEDURE — 83861 MICROFLUID ANALY TEARS: CPT | Mod: RT | Performed by: OPHTHALMOLOGY

## 2024-03-22 PROCEDURE — 92285 EXTERNAL OCULAR PHOTOGRAPHY: CPT | Performed by: OPHTHALMOLOGY

## 2024-03-22 PROCEDURE — 99214 OFFICE O/P EST MOD 30 MIN: CPT | Performed by: OPHTHALMOLOGY

## 2024-03-22 ASSESSMENT — REFRACTION_CURRENTRX
OS_VPRISM_DIRECTION: PROGS
OD_AXIS: 011
OS_AXIS: 007
OD_SPHERE: +2.50
OD_OVR_VA: 20/
OS_CYLINDER: -0.25
OS_ADD: +2.00
OD_CYLINDER: -1.25
OD_ADD: +2.00
OS_SPHERE: +2.25
OS_OVR_VA: 20/
OD_VPRISM_DIRECTION: PROGS

## 2024-04-10 ENCOUNTER — RX RENEWAL (OUTPATIENT)
Age: 59
End: 2024-04-10

## 2024-04-24 ENCOUNTER — APPOINTMENT (OUTPATIENT)
Dept: FAMILY MEDICINE | Facility: CLINIC | Age: 59
End: 2024-04-24
Payer: MEDICAID

## 2024-04-24 VITALS
DIASTOLIC BLOOD PRESSURE: 70 MMHG | HEIGHT: 66 IN | SYSTOLIC BLOOD PRESSURE: 120 MMHG | OXYGEN SATURATION: 99 % | HEART RATE: 82 BPM | RESPIRATION RATE: 14 BRPM | BODY MASS INDEX: 38.89 KG/M2 | WEIGHT: 242 LBS

## 2024-04-24 DIAGNOSIS — E66.9 OBESITY, UNSPECIFIED: ICD-10-CM

## 2024-04-24 DIAGNOSIS — N39.0 URINARY TRACT INFECTION, SITE NOT SPECIFIED: ICD-10-CM

## 2024-04-24 DIAGNOSIS — Z12.4 ENCOUNTER FOR SCREENING FOR MALIGNANT NEOPLASM OF CERVIX: ICD-10-CM

## 2024-04-24 DIAGNOSIS — Z01.818 ENCOUNTER FOR OTHER PREPROCEDURAL EXAMINATION: ICD-10-CM

## 2024-04-24 PROCEDURE — 99214 OFFICE O/P EST MOD 30 MIN: CPT

## 2024-04-24 NOTE — PLAN
[FreeTextEntry1] :    Medical clearance discussed with and reviewed by supervising attending Dr Elinor Livingston M.D.

## 2024-04-24 NOTE — RESULTS/DATA
[] : not indicated [de-identified] : NSR [de-identified] : UA: + Leukocytes and +WBC ABO RH: O positive

## 2024-04-24 NOTE — HISTORY OF PRESENT ILLNESS
[No Pertinent Cardiac History] : no history of aortic stenosis, atrial fibrillation, coronary artery disease, recent myocardial infarction, or implantable device/pacemaker [Sleep Apnea] : sleep apnea [No Adverse Anesthesia Reaction] : no adverse anesthesia reaction in self or family member [(Patient denies any chest pain, claudication, dyspnea on exertion, orthopnea, palpitations or syncope)] : Patient denies any chest pain, claudication, dyspnea on exertion, orthopnea, palpitations or syncope [Asthma] : no asthma [COPD] : no COPD [Smoker] : not a smoker [Chronic Anticoagulation] : no chronic anticoagulation [Chronic Kidney Disease] : no chronic kidney disease [Diabetes] : no diabetes [FreeTextEntry1] : GASTRIC SLEEVE [FreeTextEntry2] : 4/29/24 [FreeTextEntry3] : Elias Pickard MD

## 2024-04-24 NOTE — RESULTS/DATA
[] : not indicated [de-identified] : NSR [de-identified] : UA: + Leukocytes and +WBC ABO RH: O positive

## 2024-04-24 NOTE — PHYSICAL EXAM
[No Acute Distress] : no acute distress [Well Nourished] : well nourished [Well Developed] : well developed [Well-Appearing] : well-appearing [Normal Sclera/Conjunctiva] : normal sclera/conjunctiva [PERRL] : pupils equal round and reactive to light [EOMI] : extraocular movements intact [Normal Outer Ear/Nose] : the outer ears and nose were normal in appearance [Normal Oropharynx] : the oropharynx was normal [No JVD] : no jugular venous distention [No Lymphadenopathy] : no lymphadenopathy [Supple] : supple [Thyroid Normal, No Nodules] : the thyroid was normal and there were no nodules present [No Respiratory Distress] : no respiratory distress  [No Accessory Muscle Use] : no accessory muscle use [Clear to Auscultation] : lungs were clear to auscultation bilaterally [Normal Rate] : normal rate  [Regular Rhythm] : with a regular rhythm [Normal S1, S2] : normal S1 and S2 [No Murmur] : no murmur heard [No Carotid Bruits] : no carotid bruits [No Abdominal Bruit] : a ~M bruit was not heard ~T in the abdomen [No Varicosities] : no varicosities [Pedal Pulses Present] : the pedal pulses are present [No Edema] : there was no peripheral edema [No Palpable Aorta] : no palpable aorta [No Extremity Clubbing/Cyanosis] : no extremity clubbing/cyanosis [Soft] : abdomen soft [Non Tender] : non-tender [Non-distended] : non-distended [No Masses] : no abdominal mass palpated [No HSM] : no HSM [Normal Bowel Sounds] : normal bowel sounds [Normal Posterior Cervical Nodes] : no posterior cervical lymphadenopathy [Normal Anterior Cervical Nodes] : no anterior cervical lymphadenopathy [No CVA Tenderness] : no CVA  tenderness [No Spinal Tenderness] : no spinal tenderness [No Joint Swelling] : no joint swelling [Grossly Normal Strength/Tone] : grossly normal strength/tone [No Rash] : no rash [Coordination Grossly Intact] : coordination grossly intact [No Focal Deficits] : no focal deficits [Normal Gait] : normal gait [Deep Tendon Reflexes (DTR)] : deep tendon reflexes were 2+ and symmetric [Normal Affect] : the affect was normal [Normal Insight/Judgement] : insight and judgment were intact [de-identified] : obese

## 2024-04-24 NOTE — ASSESSMENT
[High Risk Surgery - Intraperitoneal, Intrathoracic or Supringuinal Vascular Procedures] : High Risk Surgery - Intraperitoneal, Intrathoracic or Supringuinal Vascular Procedures - Yes (1) [Ischemic Heart Disease] : Ischemic Heart Disease - No (0) [Congestive Heart Failure] : Congestive Heart Failure - No (0) [Prior Cerebrovascular Accident or TIA] : Prior Cerebrovascular Accident or TIA - No (0) [Creatinine >= 2mg/dL (1 Point)] : Creatinine >= 2mg/dL - No (0) [Insulin-dependent Diabetic (1 Point)] : Insulin-dependent Diabetic - No (0) [1] : 1 , RCRI Class: II, Risk of Post-Op Cardiac Complications: 6.0%, 95% CI for Risk Estimate: 4.9% - 7.4% [Patient Optimized for Surgery] : Patient optimized for surgery [No Further Testing Recommended] : no further testing recommended [As per surgery] : as per surgery [FreeTextEntry4] : Laboratory results reviewed, no abnormalities found. + UA, started on 3 days of Cipro 500 mg bid. EKG reviewed, no abnormalities found. Pt is medically cleared for proposed procedure. [FreeTextEntry7] : Do not take any medications on the day of the procedure. Avoid NSAIDs starting 1 month prior to procedure

## 2024-04-24 NOTE — PHYSICAL EXAM
[No Acute Distress] : no acute distress [Well Nourished] : well nourished [Well Developed] : well developed [Well-Appearing] : well-appearing [Normal Sclera/Conjunctiva] : normal sclera/conjunctiva [PERRL] : pupils equal round and reactive to light [EOMI] : extraocular movements intact [Normal Outer Ear/Nose] : the outer ears and nose were normal in appearance [Normal Oropharynx] : the oropharynx was normal [No JVD] : no jugular venous distention [No Lymphadenopathy] : no lymphadenopathy [Supple] : supple [Thyroid Normal, No Nodules] : the thyroid was normal and there were no nodules present [No Respiratory Distress] : no respiratory distress  [No Accessory Muscle Use] : no accessory muscle use [Clear to Auscultation] : lungs were clear to auscultation bilaterally [Normal Rate] : normal rate  [Regular Rhythm] : with a regular rhythm [Normal S1, S2] : normal S1 and S2 [No Murmur] : no murmur heard [No Carotid Bruits] : no carotid bruits [No Abdominal Bruit] : a ~M bruit was not heard ~T in the abdomen [No Varicosities] : no varicosities [Pedal Pulses Present] : the pedal pulses are present [No Edema] : there was no peripheral edema [No Palpable Aorta] : no palpable aorta [No Extremity Clubbing/Cyanosis] : no extremity clubbing/cyanosis [Soft] : abdomen soft [Non Tender] : non-tender [Non-distended] : non-distended [No Masses] : no abdominal mass palpated [No HSM] : no HSM [Normal Bowel Sounds] : normal bowel sounds [Normal Posterior Cervical Nodes] : no posterior cervical lymphadenopathy [Normal Anterior Cervical Nodes] : no anterior cervical lymphadenopathy [No CVA Tenderness] : no CVA  tenderness [No Spinal Tenderness] : no spinal tenderness [No Joint Swelling] : no joint swelling [Grossly Normal Strength/Tone] : grossly normal strength/tone [No Rash] : no rash [Coordination Grossly Intact] : coordination grossly intact [No Focal Deficits] : no focal deficits [Normal Gait] : normal gait [Deep Tendon Reflexes (DTR)] : deep tendon reflexes were 2+ and symmetric [Normal Affect] : the affect was normal [Normal Insight/Judgement] : insight and judgment were intact [de-identified] : obese

## 2024-05-13 ENCOUNTER — NON-APPOINTMENT (OUTPATIENT)
Age: 59
End: 2024-05-13

## 2024-05-28 ENCOUNTER — LABORATORY RESULT (OUTPATIENT)
Age: 59
End: 2024-05-28

## 2024-05-28 ENCOUNTER — APPOINTMENT (OUTPATIENT)
Dept: FAMILY MEDICINE | Facility: CLINIC | Age: 59
End: 2024-05-28
Payer: MEDICAID

## 2024-05-28 VITALS
BODY MASS INDEX: 38.41 KG/M2 | OXYGEN SATURATION: 98 % | HEIGHT: 66 IN | WEIGHT: 239 LBS | SYSTOLIC BLOOD PRESSURE: 114 MMHG | DIASTOLIC BLOOD PRESSURE: 80 MMHG | HEART RATE: 89 BPM | RESPIRATION RATE: 14 BRPM

## 2024-05-28 DIAGNOSIS — N28.1 CYST OF KIDNEY, ACQUIRED: ICD-10-CM

## 2024-05-28 DIAGNOSIS — Q17.9 CONGENITAL MALFORMATION OF EAR, UNSPECIFIED: ICD-10-CM

## 2024-05-28 DIAGNOSIS — Z00.00 ENCOUNTER FOR GENERAL ADULT MEDICAL EXAMINATION W/OUT ABNORMAL FINDINGS: ICD-10-CM

## 2024-05-28 PROCEDURE — 99396 PREV VISIT EST AGE 40-64: CPT | Mod: 25

## 2024-05-28 RX ORDER — FUROSEMIDE 40 MG/1
40 TABLET ORAL
Qty: 30 | Refills: 0 | Status: DISCONTINUED | COMMUNITY
Start: 2023-05-03 | End: 2024-05-28

## 2024-05-28 RX ORDER — CIPROFLOXACIN HYDROCHLORIDE 500 MG/1
500 TABLET, FILM COATED ORAL TWICE DAILY
Qty: 6 | Refills: 0 | Status: DISCONTINUED | COMMUNITY
Start: 2024-04-24 | End: 2024-05-28

## 2024-05-28 NOTE — HEALTH RISK ASSESSMENT
[Good] : ~his/her~  mood as  good [No] : In the past 12 months have you used drugs other than those required for medical reasons? No [No falls in past year] : Patient reported no falls in the past year [0] : 2) Feeling down, depressed, or hopeless: Not at all (0) [I have developed a follow-up plan documented below in the note.] : I have developed a follow-up plan documented below in the note. [Patient reported mammogram was normal] : Patient reported mammogram was normal [Patient declined PAP Smear] : Patient declined PAP Smear [HIV test declined] : HIV test declined [Hepatitis C test declined] : Hepatitis C test declined [With Family] : lives with family [Employed] : employed [] :  [# Of Children ___] : has [unfilled] children [Feels Safe at Home] : Feels safe at home [Fully functional (bathing, dressing, toileting, transferring, walking, feeding)] : Fully functional (bathing, dressing, toileting, transferring, walking, feeding) [Fully functional (using the telephone, shopping, preparing meals, housekeeping, doing laundry, using] : Fully functional and needs no help or supervision to perform IADLs (using the telephone, shopping, preparing meals, housekeeping, doing laundry, using transportation, managing medications and managing finances) [Smoke Detector] : smoke detector [Safety elements used in home] : safety elements used in home [Seat Belt] :  uses seat belt [Designated Healthcare Proxy] : Designated healthcare proxy [Name: ___] : Health Care Proxy's Name: [unfilled]  [Relationship: ___] : Relationship: [unfilled] [PHQ-2 Negative - No further assessment needed] : PHQ-2 Negative - No further assessment needed [Never] : Never [Patient reported colonoscopy was normal] : Patient reported colonoscopy was normal [With Patient/Caregiver] : , with patient/caregiver [de-identified] : no [de-identified] : no [[id number]] : 0 [Sexually Active] : not sexually active [Reports changes in hearing] : Reports no changes in hearing [Reports changes in vision] : Reports no changes in vision [Reports changes in dental health] : Reports no changes in dental health [TB Exposure] : is not being exposed to tuberculosis [MammogramDate] : 05/23 [PapSmearComments] : Hx Hysterectomy 2008 [ColonoscopyDate] : 05/23 [ColonoscopyComments] : FOBT [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

## 2024-05-28 NOTE — HISTORY OF PRESENT ILLNESS
[FreeTextEntry1] : CPE [de-identified] : 59 y/o HF originally from [place], presents today for CPE S/P Bariatric Sx, gastric sleeve 4/29/24. Previous Bariatric sx in 2019. Pt seen by Psychiatry for Depression-Anxiety. Previous PCP: Dr Saskia Dsouza Pt with medical hx significant for Anxiety-depression on Fluoxetine seen by psychiatry, PA: aileen Grijalva. She has a long hx MIgraines and takes Amitriptyline. Seen by Neurology. She has BERTHA on CPAP. She has a hx bariatric surgery in 2019. She gain all weight back> Bariatric sx Dr Myers in [facility]. Meds for weight loss not cover by insurance. reports B/L ankle edema for long time. She is requesting letter for work due to ankle edema and migraines increased with noise exposure.

## 2024-05-28 NOTE — ASSESSMENT
[FreeTextEntry1] :  HCM; -Blood and UA today Mammo: referral Colon cancer screening: FOBT 05/2023: Negative Gyn: hx total hysterectomy Immunizations: refused flu shot DEXA: 05/2023 Normal  Chronic Migraines: -On amitriptyline 100mg -F/up with Neurology  Anxiety-Depression: -On Fluoxetine and Mirtazapine. -F/up with psychiatry PA  S/P Bariatric surgery/ Obesity:  # Left hearing loss and TM not seen: -ADvise ENT f/up  # Left kidney Cyst on Incidental finding: -USG order for f/up  Further recommendations with lab results.

## 2024-05-29 LAB
25(OH)D3 SERPL-MCNC: 35.3 NG/ML
ALBUMIN SERPL ELPH-MCNC: 4.4 G/DL
ALP BLD-CCNC: 148 U/L
ALT SERPL-CCNC: 26 U/L
ANION GAP SERPL CALC-SCNC: 11 MMOL/L
AST SERPL-CCNC: 20 U/L
BASOPHILS # BLD AUTO: 0.04 K/UL
BASOPHILS NFR BLD AUTO: 0.6 %
BILIRUB SERPL-MCNC: 0.2 MG/DL
BUN SERPL-MCNC: 18 MG/DL
CALCIUM SERPL-MCNC: 9.9 MG/DL
CHLORIDE SERPL-SCNC: 102 MMOL/L
CHOLEST SERPL-MCNC: 184 MG/DL
CO2 SERPL-SCNC: 29 MMOL/L
CREAT SERPL-MCNC: 0.67 MG/DL
EGFR: 101 ML/MIN/1.73M2
EOSINOPHIL # BLD AUTO: 0.25 K/UL
EOSINOPHIL NFR BLD AUTO: 3.6 %
GLUCOSE SERPL-MCNC: 99 MG/DL
HCT VFR BLD CALC: 42.3 %
HDLC SERPL-MCNC: 42 MG/DL
HGB BLD-MCNC: 13.2 G/DL
IMM GRANULOCYTES NFR BLD AUTO: 0.1 %
LDLC SERPL CALC-MCNC: 113 MG/DL
LYMPHOCYTES # BLD AUTO: 1.82 K/UL
LYMPHOCYTES NFR BLD AUTO: 26.2 %
MAN DIFF?: NORMAL
MCHC RBC-ENTMCNC: 27.9 PG
MCHC RBC-ENTMCNC: 31.2 GM/DL
MCV RBC AUTO: 89.4 FL
MONOCYTES # BLD AUTO: 0.55 K/UL
MONOCYTES NFR BLD AUTO: 7.9 %
NEUTROPHILS # BLD AUTO: 4.27 K/UL
NEUTROPHILS NFR BLD AUTO: 61.6 %
NONHDLC SERPL-MCNC: 142 MG/DL
PLATELET # BLD AUTO: 240 K/UL
POTASSIUM SERPL-SCNC: 4.2 MMOL/L
PROT SERPL-MCNC: 7 G/DL
RBC # BLD: 4.73 M/UL
RBC # FLD: 15.7 %
SODIUM SERPL-SCNC: 141 MMOL/L
TRIGL SERPL-MCNC: 165 MG/DL
TSH SERPL-ACNC: 1.56 UIU/ML
WBC # FLD AUTO: 6.94 K/UL

## 2024-05-30 LAB
APPEARANCE: ABNORMAL
BILIRUBIN URINE: NEGATIVE
BLOOD URINE: NEGATIVE
COLOR: NORMAL
GLUCOSE QUALITATIVE U: NEGATIVE MG/DL
KETONES URINE: ABNORMAL MG/DL
LEUKOCYTE ESTERASE URINE: ABNORMAL
NITRITE URINE: NEGATIVE
PH URINE: 6
PROTEIN URINE: 30 MG/DL
SPECIFIC GRAVITY URINE: 1.03
UROBILINOGEN URINE: 0.2 MG/DL

## 2024-05-31 ENCOUNTER — APPOINTMENT (OUTPATIENT)
Dept: MRI IMAGING | Facility: CLINIC | Age: 59
End: 2024-05-31

## 2024-06-01 DIAGNOSIS — R19.5 OTHER FECAL ABNORMALITIES: ICD-10-CM

## 2024-06-01 LAB — HEMOCCULT STL QL IA: POSITIVE

## 2024-06-04 ENCOUNTER — NON-APPOINTMENT (OUTPATIENT)
Age: 59
End: 2024-06-04

## 2024-06-10 RX ORDER — FLUOXETINE HYDROCHLORIDE 40 MG/1
40 CAPSULE ORAL
Qty: 90 | Refills: 0 | Status: ACTIVE | COMMUNITY
Start: 1900-01-01 | End: 1900-01-01

## 2024-06-14 ENCOUNTER — OFFICE (OUTPATIENT)
Dept: URBAN - METROPOLITAN AREA CLINIC 112 | Facility: CLINIC | Age: 59
Setting detail: OPHTHALMOLOGY
End: 2024-06-14
Payer: COMMERCIAL

## 2024-06-14 DIAGNOSIS — H04.123: ICD-10-CM

## 2024-06-14 PROCEDURE — 92012 INTRM OPH EXAM EST PATIENT: CPT | Performed by: OPHTHALMOLOGY

## 2024-06-14 ASSESSMENT — CONFRONTATIONAL VISUAL FIELD TEST (CVF)
OD_FINDINGS: FULL
OS_FINDINGS: FULL

## 2024-06-24 ENCOUNTER — APPOINTMENT (OUTPATIENT)
Dept: MAMMOGRAPHY | Facility: CLINIC | Age: 59
End: 2024-06-24
Payer: MEDICAID

## 2024-06-24 ENCOUNTER — RESULT REVIEW (OUTPATIENT)
Age: 59
End: 2024-06-24

## 2024-06-24 PROCEDURE — 77063 BREAST TOMOSYNTHESIS BI: CPT

## 2024-06-24 PROCEDURE — 77067 SCR MAMMO BI INCL CAD: CPT

## 2024-06-25 ENCOUNTER — OFFICE (OUTPATIENT)
Dept: URBAN - METROPOLITAN AREA CLINIC 112 | Facility: CLINIC | Age: 59
Setting detail: OPHTHALMOLOGY
End: 2024-06-25
Payer: COMMERCIAL

## 2024-06-25 DIAGNOSIS — H11.042: ICD-10-CM

## 2024-06-25 PROCEDURE — 99214 OFFICE O/P EST MOD 30 MIN: CPT | Performed by: OPHTHALMOLOGY

## 2024-06-25 PROCEDURE — 92285 EXTERNAL OCULAR PHOTOGRAPHY: CPT | Performed by: OPHTHALMOLOGY

## 2024-06-25 ASSESSMENT — CONFRONTATIONAL VISUAL FIELD TEST (CVF)
OS_FINDINGS: FULL
OD_FINDINGS: FULL

## 2024-06-28 ENCOUNTER — OUTPATIENT (OUTPATIENT)
Dept: OUTPATIENT SERVICES | Facility: HOSPITAL | Age: 59
LOS: 1 days | End: 2024-06-28
Payer: MEDICAID

## 2024-06-28 ENCOUNTER — APPOINTMENT (OUTPATIENT)
Dept: ULTRASOUND IMAGING | Facility: CLINIC | Age: 59
End: 2024-06-28
Payer: MEDICAID

## 2024-06-28 DIAGNOSIS — N28.1 CYST OF KIDNEY, ACQUIRED: ICD-10-CM

## 2024-06-28 PROCEDURE — 76770 US EXAM ABDO BACK WALL COMP: CPT

## 2024-06-28 PROCEDURE — 76770 US EXAM ABDO BACK WALL COMP: CPT | Mod: 26

## 2024-07-01 ENCOUNTER — NON-APPOINTMENT (OUTPATIENT)
Age: 59
End: 2024-07-01

## 2024-07-03 ENCOUNTER — APPOINTMENT (OUTPATIENT)
Dept: MRI IMAGING | Facility: CLINIC | Age: 59
End: 2024-07-03

## 2024-07-12 ENCOUNTER — APPOINTMENT (OUTPATIENT)
Dept: FAMILY MEDICINE | Facility: CLINIC | Age: 59
End: 2024-07-12
Payer: MEDICAID

## 2024-07-12 VITALS
SYSTOLIC BLOOD PRESSURE: 124 MMHG | TEMPERATURE: 98.5 F | HEIGHT: 66 IN | RESPIRATION RATE: 16 BRPM | HEART RATE: 71 BPM | OXYGEN SATURATION: 98 % | DIASTOLIC BLOOD PRESSURE: 82 MMHG | WEIGHT: 224 LBS | BODY MASS INDEX: 36 KG/M2

## 2024-07-12 DIAGNOSIS — M25.471 EFFUSION, RIGHT ANKLE: ICD-10-CM

## 2024-07-12 DIAGNOSIS — M25.472 EFFUSION, RIGHT ANKLE: ICD-10-CM

## 2024-07-12 DIAGNOSIS — Z01.818 ENCOUNTER FOR OTHER PREPROCEDURAL EXAMINATION: ICD-10-CM

## 2024-07-12 DIAGNOSIS — H11.002 UNSPECIFIED PTERYGIUM OF LEFT EYE: ICD-10-CM

## 2024-07-12 DIAGNOSIS — N28.1 CYST OF KIDNEY, ACQUIRED: ICD-10-CM

## 2024-07-12 PROCEDURE — 99214 OFFICE O/P EST MOD 30 MIN: CPT

## 2024-07-12 PROCEDURE — G2211 COMPLEX E/M VISIT ADD ON: CPT | Mod: NC

## 2024-07-15 ENCOUNTER — ASC (OUTPATIENT)
Dept: URBAN - METROPOLITAN AREA SURGERY 8 | Facility: SURGERY | Age: 59
Setting detail: OPHTHALMOLOGY
End: 2024-07-15
Payer: COMMERCIAL

## 2024-07-15 DIAGNOSIS — H11.042: ICD-10-CM

## 2024-07-15 PROCEDURE — 65426 REMOVAL OF EYE LESION: CPT | Mod: LT | Performed by: OPHTHALMOLOGY

## 2024-07-16 ENCOUNTER — RX ONLY (RX ONLY)
Age: 59
End: 2024-07-16

## 2024-07-16 ENCOUNTER — OFFICE (OUTPATIENT)
Dept: URBAN - METROPOLITAN AREA CLINIC 116 | Facility: CLINIC | Age: 59
Setting detail: OPHTHALMOLOGY
End: 2024-07-16
Payer: COMMERCIAL

## 2024-07-16 DIAGNOSIS — H11.042: ICD-10-CM

## 2024-07-16 PROCEDURE — 99024 POSTOP FOLLOW-UP VISIT: CPT | Performed by: PHYSICIAN ASSISTANT

## 2024-07-16 ASSESSMENT — CONFRONTATIONAL VISUAL FIELD TEST (CVF)
OS_FINDINGS: FULL
OD_FINDINGS: FULL

## 2024-07-17 ENCOUNTER — APPOINTMENT (OUTPATIENT)
Dept: NEUROLOGY | Facility: CLINIC | Age: 59
End: 2024-07-17

## 2024-07-30 ENCOUNTER — OFFICE (OUTPATIENT)
Dept: URBAN - METROPOLITAN AREA CLINIC 116 | Facility: CLINIC | Age: 59
Setting detail: OPHTHALMOLOGY
End: 2024-07-30
Payer: COMMERCIAL

## 2024-07-30 DIAGNOSIS — H11.042: ICD-10-CM

## 2024-07-30 PROCEDURE — 99024 POSTOP FOLLOW-UP VISIT: CPT | Performed by: PHYSICIAN ASSISTANT

## 2024-07-30 ASSESSMENT — CONFRONTATIONAL VISUAL FIELD TEST (CVF)
OD_FINDINGS: FULL
OS_FINDINGS: FULL

## 2024-08-08 ENCOUNTER — APPOINTMENT (OUTPATIENT)
Dept: MRI IMAGING | Facility: CLINIC | Age: 59
End: 2024-08-08

## 2024-08-17 ENCOUNTER — NON-APPOINTMENT (OUTPATIENT)
Age: 59
End: 2024-08-17

## 2024-08-17 NOTE — HISTORY OF PRESENT ILLNESS
[FreeTextEntry1] : US KB (june 2024): Right upper pole renal cortical cysts and left peripelvic cysts. No hydronephrosis.

## 2024-08-19 ENCOUNTER — APPOINTMENT (OUTPATIENT)
Dept: UROLOGY | Facility: CLINIC | Age: 59
End: 2024-08-19

## 2024-08-19 ENCOUNTER — APPOINTMENT (OUTPATIENT)
Dept: NEUROLOGY | Facility: CLINIC | Age: 59
End: 2024-08-19

## 2024-08-27 DIAGNOSIS — M25.532 PAIN IN RIGHT WRIST: ICD-10-CM

## 2024-08-27 DIAGNOSIS — M25.531 PAIN IN RIGHT WRIST: ICD-10-CM

## 2024-08-29 ENCOUNTER — APPOINTMENT (OUTPATIENT)
Dept: ORTHOPEDIC SURGERY | Facility: CLINIC | Age: 59
End: 2024-08-29
Payer: MEDICAID

## 2024-08-29 VITALS
DIASTOLIC BLOOD PRESSURE: 80 MMHG | WEIGHT: 218 LBS | HEART RATE: 66 BPM | HEIGHT: 66 IN | BODY MASS INDEX: 35.03 KG/M2 | SYSTOLIC BLOOD PRESSURE: 122 MMHG

## 2024-08-29 DIAGNOSIS — M17.11 UNILATERAL PRIMARY OSTEOARTHRITIS, RIGHT KNEE: ICD-10-CM

## 2024-08-29 PROCEDURE — 20610 DRAIN/INJ JOINT/BURSA W/O US: CPT | Mod: RT

## 2024-08-29 PROCEDURE — 99204 OFFICE O/P NEW MOD 45 MIN: CPT | Mod: 25

## 2024-08-29 PROCEDURE — 73564 X-RAY EXAM KNEE 4 OR MORE: CPT | Mod: RT

## 2024-08-29 RX ORDER — DICLOFENAC SODIUM 3 G/100G
3 GEL TOPICAL TWICE DAILY
Qty: 1 | Refills: 0 | Status: ACTIVE | COMMUNITY
Start: 2024-08-29 | End: 1900-01-01

## 2024-08-29 RX ORDER — DICLOFENAC SODIUM AND BENZALKONIUM CHLORIDE
0 KIT
Qty: 0 | Refills: 0 | DISCHARGE
Start: 2024-08-29

## 2024-08-29 RX ORDER — PANTOPRAZOLE SODIUM 20 MG/1
20 TABLET, DELAYED RELEASE ORAL
Refills: 0 | Status: ACTIVE | COMMUNITY

## 2024-08-29 RX ORDER — OMEPRAZOLE 20 MG/1
20 TABLET, ORALLY DISINTEGRATING, DELAYED RELEASE ORAL
Refills: 0 | Status: ACTIVE | COMMUNITY

## 2024-08-29 RX ORDER — CALCIUM CITRATE/VITAMIN D3 315MG-6.25
TABLET ORAL
Refills: 0 | Status: ACTIVE | COMMUNITY

## 2024-08-29 NOTE — HISTORY OF PRESENT ILLNESS
[de-identified] : Chief Complaint: Right knee pain   History of Present Illness: 59-year-old female presenting today for complaints of right knee pain.  Patient Salvadorean-speaking interpretation was assisted by her daughter today.  She states that she developed right knee pain about 1 week ago came on insidiously and now she is having severe difficulty walking she was seen in urgent care was diagnosed with knee pain was sent home with Tylenol.  She is here today for proper evaluation and treatment.  She has severe pain with walking bending her knee going up and down steps.  She never had the symptoms prior.  She is try Tylenol with limited relief of symptoms.  She denies any falls or injuries.   Past medical history, past surgical history, medications, allergies, social history, and family history are as documented in our records today.  Notable items include: None   Review of Systems: I have reviewed the patient's documented Review of Systems data today, I concur with this documentation.

## 2024-08-29 NOTE — PROCEDURE
[FreeTextEntry1] : I discussed at length with the patient the planned steroid and lidocaine injection. The risks, benefits, convalescence and alternatives were reviewed. The possible side effects discussed included but were not limited to: pain, swelling, heat, bleeding, and redness. Symptoms are generally mild but if they are extensive then contact the office. Giving pain relievers by mouth such as NSAIDs or Tylenol can generally treat the reactions to steroid and lidocaine. Rare cases of infection have been noted. Rash, hives and itching may occur post injection. If you have muscle pain or cramps, flushing and or swelling of the face, rapid heart beat, nausea, dizziness, fever, chills, headache, difficulty breathing, swelling in the arms or legs, or have a prickly feeling of your skin, contact a health care provider immediately. Following this discussion, the right knee was prepped with Alcohol and under sterile condition the 80 mg Depo-Medrol, 1mL of 30mg/mL of ketorolac and 2 cc Lidocaine injection was performed with a 22 gauge needle through a super parapatellar injection site. The needle was introduced into the joint, aspiration was performed to ensure intra-articular placement and the medication was injected. Upon withdrawal of the needle the site was cleaned with alcohol and a band aid applied. The patient tolerated the injection well and there were no adverse effects. Post injection instructions included no strenuous activity for 24 hours, cryotherapy and if there are any adverse effects to contact the office.

## 2024-08-29 NOTE — PHYSICAL EXAM
[de-identified] : CONSTITUTIONAL: Patient is a very pleasant individual who is well-nourished and appears stated age. PSYCHIATRIC: Alert and oriented times three and in no apparent distress, and participates with orthopedic evaluation well. HEAD: Atraumatic and nonsyndromic in appearance. EENT: No thyromegaly, EOMI. RESPIRATORY: Respiratory rate is regular, not dyspneic on examination. LYMPHATICS: There is no cervical or axillary lymphadenopathy. INTEGUMENTARY: Skin is clean, dry, and intact to bilateral lower extremities. VASCULAR: There is brisk capillary refill about the bilateral Lower Extremities with 2+ DP Pulse  Right knee exam No significant erythema swelling or trauma to the knee Standing posture demonstrates slight valgus deformity Patient has severe difficulty with ambulating the exam room second right knee pain Range of motion right knee full extension to 100 degrees of knee flexion without any significant discomfort Severe pain to lateral compartment and lateral parapatellar region Pain with patellar glide pain with patellar compression Discomfort with varus and valgus stress No significant instability with anterior posterior drawer   [de-identified] : 4 views right knee performed on 8/29/2024 AP PA lateral and sunrise view demonstrates small valgus deformity with medial and lateral compartment wear mild to moderate There is severe patellofemoral joint arthritis No signs of fractures or dislocations

## 2024-08-29 NOTE — DISCUSSION/SUMMARY
[de-identified] : 59-year-old female with a right knee osteoarthritis and patellofemoral severe osteoarthritis  Tylenol 1000 mg every 8 hours Diclofenac external gel applied topically as needed RICE Knee injection forms in the office PT*'s  service referral placed for assisting with scheduling location I would see her back in 2 weeks

## 2024-08-30 PROBLEM — H25.12 CATARACT; RIGHT EYE, LEFT EYE, BOTH EYES: Status: ACTIVE | Noted: 2024-08-30

## 2024-08-30 PROBLEM — H04.123 DRY EYE SYNDROME LACRIMAL GLAND; BOTH EYES: Status: ACTIVE | Noted: 2024-08-30

## 2024-08-30 PROBLEM — H25.13 CATARACT; RIGHT EYE, LEFT EYE, BOTH EYES: Status: ACTIVE | Noted: 2024-08-30

## 2024-08-30 PROBLEM — H25.11 CATARACT; RIGHT EYE, LEFT EYE, BOTH EYES: Status: ACTIVE | Noted: 2024-08-30

## 2024-09-10 ENCOUNTER — OUTPATIENT (OUTPATIENT)
Dept: OUTPATIENT SERVICES | Facility: HOSPITAL | Age: 59
LOS: 1 days | End: 2024-09-10

## 2024-09-10 ENCOUNTER — APPOINTMENT (OUTPATIENT)
Dept: ULTRASOUND IMAGING | Facility: CLINIC | Age: 59
End: 2024-09-10
Payer: MEDICAID

## 2024-09-10 ENCOUNTER — RESULT REVIEW (OUTPATIENT)
Age: 59
End: 2024-09-10

## 2024-09-10 DIAGNOSIS — R74.8 ABNORMAL LEVELS OF OTHER SERUM ENZYMES: ICD-10-CM

## 2024-09-10 PROCEDURE — 76705 ECHO EXAM OF ABDOMEN: CPT | Mod: 26

## 2024-09-12 ENCOUNTER — APPOINTMENT (OUTPATIENT)
Dept: ORTHOPEDIC SURGERY | Facility: CLINIC | Age: 59
End: 2024-09-12
Payer: MEDICAID

## 2024-09-12 VITALS — HEIGHT: 66 IN | BODY MASS INDEX: 35.03 KG/M2 | WEIGHT: 218 LBS

## 2024-09-12 DIAGNOSIS — M17.11 UNILATERAL PRIMARY OSTEOARTHRITIS, RIGHT KNEE: ICD-10-CM

## 2024-09-12 PROCEDURE — 99212 OFFICE O/P EST SF 10 MIN: CPT

## 2024-09-12 NOTE — DISCUSSION/SUMMARY
[de-identified] : 59-year-old female with right knee tricompartmental osteoarthritis  I discussed with Gerda that she had a flareup in her knee osteoarthritis and this can occur in the future.  Preventative measures include RICE Soft shoe and footwear Possible knee brace/knee sleeve Tylenol as needed for pain control She will continue the diclofenac external solution as needed I will see her back in 2 to 3 months if her symptoms recur we can consider repeat injections

## 2024-09-12 NOTE — HISTORY OF PRESENT ILLNESS
[de-identified] : Chief Complaint: Right knee pain   History of Present Illness: 59-year-old female New Zealander-speaking her daughter was present today to assist interpretation is coming in for her 2-week follow-up visit.  Gerda was complaining of severe right knee pain during her last visit she was diagnosed with severe flareup of her right knee osteoarthritis she received a right knee intra-articular injection we tried a course of Tylenol and diclofenac external gel solution.  She states that her symptoms have significant proved she is about 80% improved.  She still has some minor aching in the right knee but she is very happy with her current progress she is ambulating without assistance.   Past medical history, past surgical history, medications, allergies, social history, and family history are as documented in our records today.  Notable items include: None   Review of Systems: I have reviewed the patient's documented Review of Systems data today, I concur with this documentation.

## 2024-09-26 ENCOUNTER — APPOINTMENT (OUTPATIENT)
Dept: ORTHOPEDIC SURGERY | Facility: CLINIC | Age: 59
End: 2024-09-26
Payer: MEDICAID

## 2024-09-26 VITALS
HEIGHT: 66 IN | HEART RATE: 80 BPM | WEIGHT: 218 LBS | DIASTOLIC BLOOD PRESSURE: 78 MMHG | SYSTOLIC BLOOD PRESSURE: 116 MMHG | BODY MASS INDEX: 35.03 KG/M2

## 2024-09-26 DIAGNOSIS — M17.11 UNILATERAL PRIMARY OSTEOARTHRITIS, RIGHT KNEE: ICD-10-CM

## 2024-09-26 PROCEDURE — 99213 OFFICE O/P EST LOW 20 MIN: CPT | Mod: 25

## 2024-09-26 PROCEDURE — 20610 DRAIN/INJ JOINT/BURSA W/O US: CPT | Mod: RT

## 2024-09-26 NOTE — DISCUSSION/SUMMARY
[de-identified] : 59-year-old female with right knee osteoarthritis  A repeat injection was performed today in the office patient had significant relief after the injection I discussed with Gerda that this would likely be the last injection we perform if this does not provide her much relief for at least 2 to 3 months then we will likely move forward with a right knee MRI for evaluation of possible meniscal injury Physical therapy was recommended [facility] service referral was placed to assist with scheduling location I will see her back in 3 months

## 2024-09-26 NOTE — REASON FOR VISIT
[Follow-Up Visit] : a follow-up visit for [FreeTextEntry2] : Right knee pain [Interpreters_IDNumber] : [id number] [Interpreters_FullName] : Manuel [[id number]] : [place]alander

## 2024-09-26 NOTE — PROCEDURE
[FreeTextEntry1] : I discussed at length with the patient the planned steroid and lidocaine injection. The risks, benefits, convalescence and alternatives were reviewed. The possible side effects discussed included but were not limited to: pain, swelling, heat, bleeding, and redness. Symptoms are generally mild but if they are extensive then contact the office. Giving pain relievers by mouth such as NSAIDs or Tylenol can generally treat the reactions to steroid and lidocaine. Rare cases of infection have been noted. Rash, hives and itching may occur post injection. If you have muscle pain or cramps, flushing and or swelling of the face, rapid heart beat, nausea, dizziness, fever, chills, headache, difficulty breathing, swelling in the arms or legs, or have a prickly feeling of your skin, contact a health care provider immediately. Following this discussion, the right knee was prepped with Alcohol and under sterile condition the 80 mg Depo-Medrol, 1mL of 30mg/mL of ketorolac and 2 cc Lidocaine injection was performed with a 22 gauge needle through a lateral parapatellar injection site. The needle was introduced into the joint, aspiration was performed to ensure intra-articular placement and the medication was injected. Upon withdrawal of the needle the site was cleaned with alcohol and a band aid applied. The patient tolerated the injection well and there were no adverse effects. Post injection instructions included no strenuous activity for 24 hours, cryotherapy and if there are any adverse effects to contact the office.

## 2024-09-26 NOTE — HISTORY OF PRESENT ILLNESS
[de-identified] : Chief Complaint: Right knee pain   History of Present Illness: 59-year-old female presenting today complaints of right knee pain.  [place]-speaking  was used today to assist with interpretation.  Gerda is complaining of similar right knee pain mainly along the lateral aspect of the knee radiating down to the proximal tibia and approximately.  She has been diagnosed with a valgus knee deformity and lateral compartment as well as severe patellofemoral compartment osteoarthritis.  She states that the symptoms recurred a couple weeks ago she recent underwent injection about 1 month ago had significant improvement however the symptoms recurred they have been worsening over the past week she is having stiffness and locking of her leg sometimes buckling.  The medications are no longer helping.  She has been taking Tylenol 1000 mg and diclofenac external gel.  She is unable to take NSAIDs due to history of an ulcer.   Past medical history, past surgical history, medications, allergies, social history, and family history are as documented in our records today.  Notable items include: None   Review of Systems: I have reviewed the patient's documented Review of Systems data today, I concur with this documentation.

## 2024-10-02 ENCOUNTER — APPOINTMENT (OUTPATIENT)
Dept: FAMILY MEDICINE | Facility: CLINIC | Age: 59
End: 2024-10-02
Payer: MEDICAID

## 2024-10-02 VITALS
OXYGEN SATURATION: 95 % | HEART RATE: 81 BPM | HEIGHT: 66 IN | WEIGHT: 220 LBS | BODY MASS INDEX: 35.36 KG/M2 | DIASTOLIC BLOOD PRESSURE: 90 MMHG | RESPIRATION RATE: 14 BRPM | SYSTOLIC BLOOD PRESSURE: 120 MMHG

## 2024-10-02 DIAGNOSIS — Z01.818 ENCOUNTER FOR OTHER PREPROCEDURAL EXAMINATION: ICD-10-CM

## 2024-10-02 DIAGNOSIS — H26.9 UNSPECIFIED CATARACT: ICD-10-CM

## 2024-10-02 PROCEDURE — G2211 COMPLEX E/M VISIT ADD ON: CPT | Mod: NC

## 2024-10-02 PROCEDURE — 99214 OFFICE O/P EST MOD 30 MIN: CPT

## 2024-10-02 NOTE — HISTORY OF PRESENT ILLNESS
[No Pertinent Cardiac History] : no history of aortic stenosis, atrial fibrillation, coronary artery disease, recent myocardial infarction, or implantable device/pacemaker [No Pertinent Pulmonary History] : no history of asthma, COPD, sleep apnea, or smoking [No Adverse Anesthesia Reaction] : no adverse anesthesia reaction in self or family member [(Patient denies any chest pain, claudication, dyspnea on exertion, orthopnea, palpitations or syncope)] : Patient denies any chest pain, claudication, dyspnea on exertion, orthopnea, palpitations or syncope [Chronic Anticoagulation] : no chronic anticoagulation [Chronic Kidney Disease] : no chronic kidney disease [Diabetes] : no diabetes [FreeTextEntry1] : Left and RT Cataract SX [FreeTextEntry2] : 10/04/24 & 10/16/24 [FreeTextEntry3] : Dr Armijo [FreeTextEntry4] : Pt with medical hx significant for Anxiety-depression on Fluoxetine seen by psychiatry, PA: aileen Grijalva. She has a long hx MIgraines and takes Amitriptyline. Seen by Neurology. She has BERTHA on CPAP. She has a hx bariatric surgery in 2019. She gain all weight back> Bariatric sx Dr Myers in [facility]. Meds for weight loss not cover by insurance. recet EGD with Dx PUD on Pantoprazole and Omperazole. RT knee pain> seen by [facility] for medical clearance.

## 2024-10-02 NOTE — ASSESSMENT
[High Risk Surgery - Intraperitoneal, Intrathoracic or Supringuinal Vascular Procedures] : High Risk Surgery - Intraperitoneal, Intrathoracic or Supringuinal Vascular Procedures - No (0) [Ischemic Heart Disease] : Ischemic Heart Disease - No (0) [Congestive Heart Failure] : Congestive Heart Failure - No (0) [Prior Cerebrovascular Accident or TIA] : Prior Cerebrovascular Accident or TIA - No (0) [Creatinine >= 2mg/dL (1 Point)] : Creatinine >= 2mg/dL - No (0) [Insulin-dependent Diabetic (1 Point)] : Insulin-dependent Diabetic - No (0) [0] : 0 , RCRI Class: I, Risk of Post-Op Cardiac Complications: 3.9%, 95% CI for Risk Estimate: 2.8% - 5.4% [Patient Optimized for Surgery] : Patient optimized for surgery [No Further Testing Recommended] : no further testing recommended [Continue medications as is] : Continue current medications [As per surgery] : as per surgery [FreeTextEntry4] : Pt with no absolute contraindication for surgical procedure. Clear from clinical stand point.

## 2024-10-04 ENCOUNTER — ASC (OUTPATIENT)
Dept: URBAN - METROPOLITAN AREA SURGERY 8 | Facility: SURGERY | Age: 59
Setting detail: OPHTHALMOLOGY
End: 2024-10-04
Payer: COMMERCIAL

## 2024-10-04 DIAGNOSIS — H25.12: ICD-10-CM

## 2024-10-04 PROCEDURE — 66984 XCAPSL CTRC RMVL W/O ECP: CPT | Mod: 79,LT | Performed by: OPHTHALMOLOGY

## 2024-10-05 ENCOUNTER — OFFICE (OUTPATIENT)
Dept: URBAN - METROPOLITAN AREA CLINIC 94 | Facility: CLINIC | Age: 59
Setting detail: OPHTHALMOLOGY
End: 2024-10-05
Payer: COMMERCIAL

## 2024-10-05 ENCOUNTER — RX ONLY (RX ONLY)
Age: 59
End: 2024-10-05

## 2024-10-05 DIAGNOSIS — Z96.1: ICD-10-CM

## 2024-10-05 PROCEDURE — 99024 POSTOP FOLLOW-UP VISIT: CPT | Performed by: REGISTERED NURSE

## 2024-10-05 ASSESSMENT — REFRACTION_CURRENTRX
OD_CYLINDER: -1.25
OD_OVR_VA: 20/
OD_ADD: +2.50
OS_AXIS: 007
OS_ADD: +2.00
OD_CYLINDER: -1.25
OD_SPHERE: +2.50
OS_OVR_VA: 20/
OD_OVR_VA: 20/
OD_ADD: +2.00
OD_SPHERE: +2.25
OD_AXIS: 011
OS_CYLINDER: -0.25
OS_OVR_VA: 20/
OS_CYLINDER: SPH
OS_VPRISM_DIRECTION: PROGS
OS_SPHERE: +2.25
OS_SPHERE: +2.00
OS_ADD: +2.50
OD_VPRISM_DIRECTION: PROGS
OD_AXIS: 009

## 2024-10-05 ASSESSMENT — REFRACTION_MANIFEST
OD_CYLINDER: -1.25
OS_AXIS: 170
OD_VA1: 20/40
OS_CYLINDER: -0.75
OD_SPHERE: +2.25
OS_VA1: 20/30
OS_SPHERE: +2.25
OD_AXIS: 015

## 2024-10-05 ASSESSMENT — KERATOMETRY
OS_K1POWER_DIOPTERS: 46.00
OD_K2POWER_DIOPTERS: 7.75
OS_AXISANGLE_DEGREES: 094
OS_K2POWER_DIOPTERS: 47.25
OD_K1POWER_DIOPTERS: 46.00
OD_AXISANGLE_DEGREES: 095

## 2024-10-05 ASSESSMENT — REFRACTION_AUTOREFRACTION
OD_AXIS: 013
OS_AXIS: 011
OD_SPHERE: +2.25
OS_SPHERE: +1.25
OD_CYLINDER: -1.50
OS_CYLINDER: -1.50

## 2024-10-05 ASSESSMENT — CONFRONTATIONAL VISUAL FIELD TEST (CVF)
OS_FINDINGS: FULL
OD_FINDINGS: FULL

## 2024-10-05 ASSESSMENT — CORNEAL EDEMA CLINICAL DESCRIPTION: OS_CORNEALEDEMA: 1+

## 2024-10-05 ASSESSMENT — TONOMETRY
OD_IOP_MMHG: 18
OS_IOP_MMHG: 18

## 2024-10-05 ASSESSMENT — VISUAL ACUITY
OS_BCVA: 20/100
OD_BCVA: 20/60

## 2024-10-10 NOTE — ED ADULT NURSE NOTE - NSHOSCREENINGQ1_ED_ALL_ED
Caller: ZUNILDA RODRIGUEZ    Relationship: SELF    Best call back number: [phone] (home) [phone] (work)    PATIENT CALLED REQUESTING TO CANCEL SAME DAY APPT.    Did the patient call AFTER the start time of their scheduled appointment?  NO    Was the patient's appointment rescheduled?  YES        No

## 2024-10-11 ENCOUNTER — RX RENEWAL (OUTPATIENT)
Age: 59
End: 2024-10-11

## 2024-10-11 RX ORDER — FLUOXETINE HCL 10 MG
0 CAPSULE ORAL
Qty: 0 | Refills: 0 | DISCHARGE
Start: 2024-10-11

## 2024-10-13 PROBLEM — M25.561 RIGHT KNEE PAIN, UNSPECIFIED CHRONICITY: Status: ACTIVE | Noted: 2024-10-13

## 2024-10-14 ENCOUNTER — APPOINTMENT (OUTPATIENT)
Dept: ORTHOPEDIC SURGERY | Facility: CLINIC | Age: 59
End: 2024-10-14
Payer: MEDICAID

## 2024-10-14 ENCOUNTER — APPOINTMENT (OUTPATIENT)
Dept: ORTHOPEDIC SURGERY | Facility: CLINIC | Age: 59
End: 2024-10-14

## 2024-10-14 VITALS
BODY MASS INDEX: 35.36 KG/M2 | DIASTOLIC BLOOD PRESSURE: 83 MMHG | HEIGHT: 66 IN | SYSTOLIC BLOOD PRESSURE: 123 MMHG | WEIGHT: 220 LBS | HEART RATE: 81 BPM

## 2024-10-14 DIAGNOSIS — M17.11 UNILATERAL PRIMARY OSTEOARTHRITIS, RIGHT KNEE: ICD-10-CM

## 2024-10-14 PROCEDURE — 99203 OFFICE O/P NEW LOW 30 MIN: CPT

## 2024-10-14 PROCEDURE — G2211 COMPLEX E/M VISIT ADD ON: CPT | Mod: NC

## 2024-10-14 RX ORDER — DICLOFENAC SODIUM 10 MG/G
1 GEL TOPICAL DAILY
Qty: 1 | Refills: 1 | Status: ACTIVE | COMMUNITY
Start: 2024-10-14 | End: 1900-01-01

## 2024-10-14 RX ORDER — DICLOFENAC SODIUM AND BENZALKONIUM CHLORIDE
0 KIT
Qty: 0 | Refills: 1 | DISCHARGE
Start: 2024-10-14

## 2024-10-16 ENCOUNTER — ASC (OUTPATIENT)
Dept: URBAN - METROPOLITAN AREA SURGERY 8 | Facility: SURGERY | Age: 59
Setting detail: OPHTHALMOLOGY
End: 2024-10-16
Payer: COMMERCIAL

## 2024-10-16 DIAGNOSIS — H25.11: ICD-10-CM

## 2024-10-16 PROCEDURE — 66984 XCAPSL CTRC RMVL W/O ECP: CPT | Mod: 79,RT | Performed by: OPHTHALMOLOGY

## 2024-10-17 ENCOUNTER — OFFICE (OUTPATIENT)
Dept: URBAN - METROPOLITAN AREA CLINIC 94 | Facility: CLINIC | Age: 59
Setting detail: OPHTHALMOLOGY
End: 2024-10-17
Payer: COMMERCIAL

## 2024-10-17 DIAGNOSIS — Z96.1: ICD-10-CM

## 2024-10-17 PROCEDURE — 99024 POSTOP FOLLOW-UP VISIT: CPT | Performed by: OPTOMETRIST

## 2024-10-17 ASSESSMENT — CORNEAL EDEMA CLINICAL DESCRIPTION: OS_CORNEALEDEMA: 1+

## 2024-10-17 ASSESSMENT — REFRACTION_CURRENTRX
OS_VPRISM_DIRECTION: PROGS
OD_ADD: +2.00
OS_OVR_VA: 20/
OD_CYLINDER: -1.25
OS_ADD: +2.50
OS_CYLINDER: -0.25
OD_SPHERE: +2.50
OD_VPRISM_DIRECTION: PROGS
OD_SPHERE: +2.25
OS_SPHERE: +2.00
OS_OVR_VA: 20/
OS_ADD: +2.00
OS_AXIS: 007
OD_OVR_VA: 20/
OD_AXIS: 011
OD_OVR_VA: 20/
OS_SPHERE: +2.25
OS_CYLINDER: SPH
OD_CYLINDER: -1.25
OD_ADD: +2.50
OD_AXIS: 009

## 2024-10-17 ASSESSMENT — VISUAL ACUITY
OD_BCVA: 20/50-1
OS_BCVA: 20/50

## 2024-10-17 ASSESSMENT — REFRACTION_AUTOREFRACTION
OD_AXIS: 006
OS_SPHERE: +1.25
OS_AXIS: 010
OS_CYLINDER: -1.50
OD_CYLINDER: -2.75
OD_SPHERE: +1.25

## 2024-10-17 ASSESSMENT — KERATOMETRY
OD_K2POWER_DIOPTERS: 48.25
OS_AXISANGLE_DEGREES: 091
OS_K1POWER_DIOPTERS: 46.00
OD_K1POWER_DIOPTERS: 45.75
OS_K2POWER_DIOPTERS: 48.00
OD_AXISANGLE_DEGREES: 097

## 2024-10-17 ASSESSMENT — REFRACTION_MANIFEST
OS_VA1: 20/30
OD_CYLINDER: -1.25
OD_VA1: 20/40
OS_CYLINDER: -0.75
OS_SPHERE: +2.25
OD_AXIS: 015
OD_SPHERE: +2.25
OS_AXIS: 170

## 2024-10-17 ASSESSMENT — CONFRONTATIONAL VISUAL FIELD TEST (CVF)
OD_FINDINGS: FULL
OS_FINDINGS: FULL

## 2024-10-17 ASSESSMENT — TONOMETRY
OD_IOP_MMHG: 21
OS_IOP_MMHG: 13

## 2024-10-22 ENCOUNTER — INPATIENT (INPATIENT)
Facility: HOSPITAL | Age: 59
LOS: 2 days | Discharge: HOME | DRG: 419 | End: 2024-10-25
Attending: SURGERY | Admitting: SURGERY
Payer: COMMERCIAL

## 2024-10-22 VITALS
SYSTOLIC BLOOD PRESSURE: 145 MMHG | WEIGHT: 186.95 LBS | TEMPERATURE: 99 F | HEART RATE: 76 BPM | DIASTOLIC BLOOD PRESSURE: 90 MMHG | OXYGEN SATURATION: 97 % | RESPIRATION RATE: 18 BRPM

## 2024-10-22 DIAGNOSIS — Z90.710 ACQUIRED ABSENCE OF BOTH CERVIX AND UTERUS: Chronic | ICD-10-CM

## 2024-10-22 DIAGNOSIS — K80.20 CALCULUS OF GALLBLADDER WITHOUT CHOLECYSTITIS WITHOUT OBSTRUCTION: ICD-10-CM

## 2024-10-22 LAB
ALBUMIN SERPL ELPH-MCNC: 4 G/DL — SIGNIFICANT CHANGE UP (ref 3.3–5.2)
ALBUMIN SERPL ELPH-MCNC: 4.3 G/DL — SIGNIFICANT CHANGE UP (ref 3.3–5.2)
ALP SERPL-CCNC: 628 U/L — HIGH (ref 40–120)
ALP SERPL-CCNC: 642 U/L — HIGH (ref 40–120)
ALT FLD-CCNC: 1414 U/L — HIGH
ALT FLD-CCNC: 1601 U/L — HIGH
ANION GAP SERPL CALC-SCNC: 13 MMOL/L — SIGNIFICANT CHANGE UP (ref 5–17)
APPEARANCE UR: CLEAR — SIGNIFICANT CHANGE UP
APTT BLD: 35.2 SEC — SIGNIFICANT CHANGE UP (ref 24.5–35.6)
AST SERPL-CCNC: 1430 U/L — HIGH
AST SERPL-CCNC: 1893 U/L — HIGH
BACTERIA # UR AUTO: NEGATIVE /HPF — SIGNIFICANT CHANGE UP
BASOPHILS # BLD AUTO: 0.03 K/UL — SIGNIFICANT CHANGE UP (ref 0–0.2)
BASOPHILS NFR BLD AUTO: 0.3 % — SIGNIFICANT CHANGE UP (ref 0–2)
BILIRUB DIRECT SERPL-MCNC: 3.1 MG/DL — HIGH (ref 0–0.3)
BILIRUB INDIRECT FLD-MCNC: 0.4 MG/DL — SIGNIFICANT CHANGE UP (ref 0.2–1)
BILIRUB SERPL-MCNC: 2.6 MG/DL — HIGH (ref 0.4–2)
BILIRUB SERPL-MCNC: 3.4 MG/DL — HIGH (ref 0.4–2)
BILIRUB UR-MCNC: ABNORMAL
BUN SERPL-MCNC: 13.8 MG/DL — SIGNIFICANT CHANGE UP (ref 8–20)
CALCIUM SERPL-MCNC: 9.5 MG/DL — SIGNIFICANT CHANGE UP (ref 8.4–10.5)
CAST: 0 /LPF — SIGNIFICANT CHANGE UP (ref 0–4)
CHLORIDE SERPL-SCNC: 98 MMOL/L — SIGNIFICANT CHANGE UP (ref 96–108)
CO2 SERPL-SCNC: 27 MMOL/L — SIGNIFICANT CHANGE UP (ref 22–29)
COLOR SPEC: SIGNIFICANT CHANGE UP
CREAT SERPL-MCNC: 0.44 MG/DL — LOW (ref 0.5–1.3)
DIFF PNL FLD: ABNORMAL
EGFR: 111 ML/MIN/1.73M2 — SIGNIFICANT CHANGE UP
EOSINOPHIL # BLD AUTO: 0.07 K/UL — SIGNIFICANT CHANGE UP (ref 0–0.5)
EOSINOPHIL NFR BLD AUTO: 0.8 % — SIGNIFICANT CHANGE UP (ref 0–6)
GLUCOSE SERPL-MCNC: 148 MG/DL — HIGH (ref 70–99)
GLUCOSE UR QL: NEGATIVE MG/DL — SIGNIFICANT CHANGE UP
HCT VFR BLD CALC: 44.9 % — SIGNIFICANT CHANGE UP (ref 34.5–45)
HGB BLD-MCNC: 14.6 G/DL — SIGNIFICANT CHANGE UP (ref 11.5–15.5)
HIV 1 & 2 AB SERPL IA.RAPID: SIGNIFICANT CHANGE UP
IMM GRANULOCYTES NFR BLD AUTO: 0.3 % — SIGNIFICANT CHANGE UP (ref 0–0.9)
INR BLD: 0.99 RATIO — SIGNIFICANT CHANGE UP (ref 0.85–1.16)
KETONES UR-MCNC: NEGATIVE MG/DL — SIGNIFICANT CHANGE UP
LEUKOCYTE ESTERASE UR-ACNC: NEGATIVE — SIGNIFICANT CHANGE UP
LYMPHOCYTES # BLD AUTO: 0.95 K/UL — LOW (ref 1–3.3)
LYMPHOCYTES # BLD AUTO: 10.2 % — LOW (ref 13–44)
MCHC RBC-ENTMCNC: 27.5 PG — SIGNIFICANT CHANGE UP (ref 27–34)
MCHC RBC-ENTMCNC: 32.5 GM/DL — SIGNIFICANT CHANGE UP (ref 32–36)
MCV RBC AUTO: 84.6 FL — SIGNIFICANT CHANGE UP (ref 80–100)
MONOCYTES # BLD AUTO: 0.32 K/UL — SIGNIFICANT CHANGE UP (ref 0–0.9)
MONOCYTES NFR BLD AUTO: 3.4 % — SIGNIFICANT CHANGE UP (ref 2–14)
NEUTROPHILS # BLD AUTO: 7.88 K/UL — HIGH (ref 1.8–7.4)
NEUTROPHILS NFR BLD AUTO: 85 % — HIGH (ref 43–77)
NITRITE UR-MCNC: NEGATIVE — SIGNIFICANT CHANGE UP
PH UR: 6.5 — SIGNIFICANT CHANGE UP (ref 5–8)
PLATELET # BLD AUTO: 282 K/UL — SIGNIFICANT CHANGE UP (ref 150–400)
POTASSIUM SERPL-MCNC: 4.5 MMOL/L — SIGNIFICANT CHANGE UP (ref 3.5–5.3)
POTASSIUM SERPL-SCNC: 4.5 MMOL/L — SIGNIFICANT CHANGE UP (ref 3.5–5.3)
PROT SERPL-MCNC: 6.7 G/DL — SIGNIFICANT CHANGE UP (ref 6.6–8.7)
PROT SERPL-MCNC: 7.6 G/DL — SIGNIFICANT CHANGE UP (ref 6.6–8.7)
PROT UR-MCNC: NEGATIVE MG/DL — SIGNIFICANT CHANGE UP
PROTHROM AB SERPL-ACNC: 11.2 SEC — SIGNIFICANT CHANGE UP (ref 9.9–13.4)
RBC # BLD: 5.31 M/UL — HIGH (ref 3.8–5.2)
RBC # FLD: 14.9 % — HIGH (ref 10.3–14.5)
RBC CASTS # UR COMP ASSIST: 4 /HPF — SIGNIFICANT CHANGE UP (ref 0–4)
SODIUM SERPL-SCNC: 138 MMOL/L — SIGNIFICANT CHANGE UP (ref 135–145)
SP GR SPEC: 1.01 — SIGNIFICANT CHANGE UP (ref 1–1.03)
SQUAMOUS # UR AUTO: 1 /HPF — SIGNIFICANT CHANGE UP (ref 0–5)
UROBILINOGEN FLD QL: 1 MG/DL — SIGNIFICANT CHANGE UP (ref 0.2–1)
WBC # BLD: 9.28 K/UL — SIGNIFICANT CHANGE UP (ref 3.8–10.5)
WBC # FLD AUTO: 9.28 K/UL — SIGNIFICANT CHANGE UP (ref 3.8–10.5)
WBC UR QL: 0 /HPF — SIGNIFICANT CHANGE UP (ref 0–5)

## 2024-10-22 PROCEDURE — 99222 1ST HOSP IP/OBS MODERATE 55: CPT | Mod: 57

## 2024-10-22 PROCEDURE — 99285 EMERGENCY DEPT VISIT HI MDM: CPT

## 2024-10-22 PROCEDURE — 93010 ELECTROCARDIOGRAM REPORT: CPT

## 2024-10-22 PROCEDURE — 76705 ECHO EXAM OF ABDOMEN: CPT | Mod: 26

## 2024-10-22 RX ORDER — ONDANSETRON HYDROCHLORIDE 2 MG/ML
4 INJECTION, SOLUTION INTRAMUSCULAR; INTRAVENOUS ONCE
Refills: 0 | Status: COMPLETED | OUTPATIENT
Start: 2024-10-22 | End: 2024-10-22

## 2024-10-22 RX ORDER — ACETAMINOPHEN 500 MG
1000 TABLET ORAL EVERY 6 HOURS
Refills: 0 | Status: COMPLETED | OUTPATIENT
Start: 2024-10-22 | End: 2024-10-23

## 2024-10-22 RX ORDER — PANTOPRAZOLE SODIUM 40 MG/1
40 TABLET, DELAYED RELEASE ORAL
Refills: 0 | Status: DISCONTINUED | OUTPATIENT
Start: 2024-10-22 | End: 2024-10-23

## 2024-10-22 RX ORDER — PIPERACILLIN AND TAZOBACTAM .5; 4 G/20ML; G/20ML
3.38 INJECTION, POWDER, LYOPHILIZED, FOR SOLUTION INTRAVENOUS ONCE
Refills: 0 | Status: COMPLETED | OUTPATIENT
Start: 2024-10-23 | End: 2024-10-23

## 2024-10-22 RX ORDER — PIPERACILLIN AND TAZOBACTAM .5; 4 G/20ML; G/20ML
3.38 INJECTION, POWDER, LYOPHILIZED, FOR SOLUTION INTRAVENOUS EVERY 8 HOURS
Refills: 0 | Status: DISCONTINUED | OUTPATIENT
Start: 2024-10-23 | End: 2024-10-23

## 2024-10-22 RX ORDER — PIPERACILLIN AND TAZOBACTAM .5; 4 G/20ML; G/20ML
3.38 INJECTION, POWDER, LYOPHILIZED, FOR SOLUTION INTRAVENOUS ONCE
Refills: 0 | Status: COMPLETED | OUTPATIENT
Start: 2024-10-22 | End: 2024-10-22

## 2024-10-22 RX ORDER — MORPHINE SULFATE 30 MG/1
4 TABLET, EXTENDED RELEASE ORAL ONCE
Refills: 0 | Status: DISCONTINUED | OUTPATIENT
Start: 2024-10-22 | End: 2024-10-22

## 2024-10-22 RX ORDER — FLUOXETINE HCL 10 MG
40 CAPSULE ORAL AT BEDTIME
Refills: 0 | Status: DISCONTINUED | OUTPATIENT
Start: 2024-10-22 | End: 2024-10-23

## 2024-10-22 RX ORDER — SODIUM CHLORIDE 9 MG/ML
1000 INJECTION, SOLUTION INTRAMUSCULAR; INTRAVENOUS; SUBCUTANEOUS ONCE
Refills: 0 | Status: COMPLETED | OUTPATIENT
Start: 2024-10-22 | End: 2024-10-22

## 2024-10-22 RX ADMIN — SODIUM CHLORIDE 1000 MILLILITER(S): 9 INJECTION, SOLUTION INTRAMUSCULAR; INTRAVENOUS; SUBCUTANEOUS at 16:23

## 2024-10-22 RX ADMIN — ONDANSETRON HYDROCHLORIDE 4 MILLIGRAM(S): 2 INJECTION, SOLUTION INTRAMUSCULAR; INTRAVENOUS at 16:22

## 2024-10-22 RX ADMIN — Medication 1000 MILLILITER(S): at 20:45

## 2024-10-22 RX ADMIN — Medication 400 MILLIGRAM(S): at 20:45

## 2024-10-22 RX ADMIN — MORPHINE SULFATE 4 MILLIGRAM(S): 30 TABLET, EXTENDED RELEASE ORAL at 16:22

## 2024-10-22 RX ADMIN — Medication 1000 MILLILITER(S): at 21:06

## 2024-10-22 RX ADMIN — MORPHINE SULFATE 4 MILLIGRAM(S): 30 TABLET, EXTENDED RELEASE ORAL at 16:52

## 2024-10-22 RX ADMIN — PIPERACILLIN AND TAZOBACTAM 200 GRAM(S): .5; 4 INJECTION, POWDER, LYOPHILIZED, FOR SOLUTION INTRAVENOUS at 20:55

## 2024-10-22 RX ADMIN — Medication 110 MILLILITER(S): at 23:35

## 2024-10-22 NOTE — ED PROVIDER NOTE - DIFFERENTIAL DIAGNOSIS
electrolyte imbalance, anemia, gastritis, pancreatitis, appendicitis, cholecystitis, colitis, diverticulitis, nephrolithiasis Differential Diagnosis

## 2024-10-22 NOTE — ED PROVIDER NOTE - CLINICAL SUMMARY MEDICAL DECISION MAKING FREE TEXT BOX
labs and diagnostic imaging results reviewed with patient and daughter labs and diagnostic imaging results reviewed with patient and daughter    Patient resting comfortably no acute distress at this time.  Patient continues to have palpable right upper quadrant tenderness on exam.  Labs reviewed, significant elevations in liver enzymes.  Mild elevation in bilirubin.  Ultrasound shows cholelithiasis with no evidence of cholecystitis.  Surgery consulted, patient admitted to Dr. Hassan.

## 2024-10-22 NOTE — ED ADULT NURSE REASSESSMENT NOTE - NS ED NURSE REASSESS COMMENT FT1
pt received from W[facility] HALL noted at this time RR even unlabored pt denies complaints pending gastro pt stable fall and safety precautions in place
pt states she feels like she has a fever, VS taken pt with 103F and MD Nicole gilmore Carl notified ordered standing meds plus tylenol will give pt stable otherwise NAD
pt stable pending UA collection NAD noted RR even unlabored IVF in progress as ordered fall and safety precautions in place denies complaints at this time

## 2024-10-22 NOTE — ED PROVIDER NOTE - ATTENDING APP SHARED VISIT CONTRIBUTION OF CARE
I, Sae Ulloa MD, performed the initial face to face bedside interview with this patient regarding history of present illness, review of symptoms and relevant past medical, social and family history.  I completed an independent physical examination.  I was the initial provider who evaluated this patient. I have signed out the follow up of any pending tests (i.e. labs, radiological studies) to the ACP.  I have communicated the patient’s plan of care and disposition with the ACP.

## 2024-10-22 NOTE — H&P ADULT - HISTORY OF PRESENT ILLNESS
HPI: 59y Female with PMHx of depression PSHx of Sleeve 5 years and gastric bypass April 2024 (at [facility]) present to ED with 2 days of abdominal pain associated with nausea , patient was recently diagnosed with marginal ulcer however states her Pain is different and not relieved by omeprazole, denies fevers/chills,      ROS: 10-system review is otherwise negative except HPI above.      PAST MEDICAL & SURGICAL HISTORY:  Migraines      History of hysterectomy        FAMILY HISTORY:  No pertinent family history in first degree relatives      Family history not pertinent as reviewed with the patient.    SOCIAL HISTORY:  Denies any toxic habits    ALLERGIES: NKA No Known Allergies        Home Medications:  amitriptyline 100 mg oral tablet: 1 tab(s) orally once a day (at bedtime) (22 Oct 2024 20:38)  FLUoxetine 40 mg oral capsule: 1 cap(s) orally once a day (at bedtime) (22 Oct 2024 20:38)      --------------------------------------------------------------------------------------------    PHYSICAL EXAM:   General: NAD, Lying in bed comfortably  Neuro: A+Ox3  HEENT: EOMI, PERRLA, MMM  Cardio: RRR  Resp: Non labored breathing on RA  GI/Abd: Soft, NT/ND, no rebound/guarding, no masses palpated  Vascular: All 4 extremities warm and well perfused.   Pelvis: stable  Musculoskeletal: All 4 extremities moving spontaneously, no limitations, no spinal tenderness.  --------------------------------------------------------------------------------------------    LABS                 14.6   9.28   )----------(  282       ( 22 Oct 2024 16:37 )               44.9      138    |  98     |  13.8   ----------------------------<  148        ( 22 Oct 2024 16:37 )  4.5     |  27.0   |  0.44     Ca    9.5        ( 22 Oct 2024 16:37 )    TPro  6.7    /  Alb  4.0    /  TBili  3.4    /  DBili  3.1    /  AST  1430   /  ALT  1414   /  AlkPhos  628    ( 22 Oct 2024 18:51 )    LIVER FUNCTIONS - ( 22 Oct 2024 18:51 )  Alb: 4.0 g/dL / Pro: 6.7 g/dL / ALK PHOS: 628 U/L / ALT: 1414 U/L / AST: 1430 U/L / GGT: x           PT/INR -  11.2 sec / 0.99 ratio   ( 22 Oct 2024 16:37 )       PTT -  35.2 sec   ( 22 Oct 2024 16:37 )  CAPILLARY BLOOD GLUCOSE        Urinalysis Basic - ( 22 Oct 2024 16:37 )    Color: x / Appearance: x / SG: x / pH: x  Gluc: 148 mg/dL / Ketone: x  / Bili: x / Urobili: x   Blood: x / Protein: x / Nitrite: x   Leuk Esterase: x / RBC: x / WBC x   Sq Epi: x / Non Sq Epi: x / Bacteria: x

## 2024-10-22 NOTE — ED ADULT NURSE NOTE - OBJECTIVE STATEMENT
Pt A+Ox4, pt daughter at bedside translating per pts request. Pt c/o ABD pain that radiates into back that started yesterday. Pt states + nausea. Pt denies V/D. Pt states that the pain continued to get worse today, causing her to come to the hospital today. Pt states that she took pantoprazole at home prior to arrival to the hospital for the pain. Pt states that she has PMH of ulcers. Pt denies taking OTC pain mediation prior to arrival. Pt states that she has chills. Pt denies SOB, CP, HA, or dizziness. Respirations are equal and unlabored on room air, pt speaking complete coherent sentences, Pt left in position of comfort, wheels of stretcher locked and in the lowest position.

## 2024-10-22 NOTE — ED PROVIDER NOTE - AVIAN FLU SYMPTOMS
Pt presents with left sided chest pain and right hip pain that radiates down right leg. ETOH on board  
No

## 2024-10-22 NOTE — H&P ADULT - ASSESSMENT
ASSESSMENT: Patient is a 59y old female with history of gastric bypass presented of evidence choledocholithiases ( elevated T bili 2.6 and alk phos 642 and elevated LFTs) with evidence of cholelithiasis on US     PLAN:    - Admit to MIS floor -Telemonitor  - NPO/IVF  - GI consult for possible lap assisted ERCP   - plan for cholecystectomy   - pain control  - DVT ppx  - OOB/ambulate  - strict I/Os  - Plan discussed with Attending, Dr. Hassan

## 2024-10-23 ENCOUNTER — TRANSCRIPTION ENCOUNTER (OUTPATIENT)
Age: 59
End: 2024-10-23

## 2024-10-23 LAB
ALBUMIN SERPL ELPH-MCNC: 3.2 G/DL — LOW (ref 3.3–5.2)
ALP SERPL-CCNC: 485 U/L — HIGH (ref 40–120)
ALT FLD-CCNC: 995 U/L — HIGH
ANION GAP SERPL CALC-SCNC: 11 MMOL/L — SIGNIFICANT CHANGE UP (ref 5–17)
AST SERPL-CCNC: 520 U/L — HIGH
BASOPHILS # BLD AUTO: 0 K/UL — SIGNIFICANT CHANGE UP (ref 0–0.2)
BASOPHILS NFR BLD AUTO: 0 % — SIGNIFICANT CHANGE UP (ref 0–2)
BILIRUB DIRECT SERPL-MCNC: 4.1 MG/DL — HIGH (ref 0–0.3)
BILIRUB INDIRECT FLD-MCNC: 0.3 MG/DL — SIGNIFICANT CHANGE UP (ref 0.2–1)
BILIRUB SERPL-MCNC: 4.4 MG/DL — HIGH (ref 0.4–2)
BUN SERPL-MCNC: 13.6 MG/DL — SIGNIFICANT CHANGE UP (ref 8–20)
CALCIUM SERPL-MCNC: 8.8 MG/DL — SIGNIFICANT CHANGE UP (ref 8.4–10.5)
CHLORIDE SERPL-SCNC: 101 MMOL/L — SIGNIFICANT CHANGE UP (ref 96–108)
CO2 SERPL-SCNC: 27 MMOL/L — SIGNIFICANT CHANGE UP (ref 22–29)
CREAT SERPL-MCNC: 0.71 MG/DL — SIGNIFICANT CHANGE UP (ref 0.5–1.3)
EGFR: 97 ML/MIN/1.73M2 — SIGNIFICANT CHANGE UP
EOSINOPHIL # BLD AUTO: 0 K/UL — SIGNIFICANT CHANGE UP (ref 0–0.5)
EOSINOPHIL NFR BLD AUTO: 0 % — SIGNIFICANT CHANGE UP (ref 0–6)
GLUCOSE BLDC GLUCOMTR-MCNC: 115 MG/DL — HIGH (ref 70–99)
GLUCOSE SERPL-MCNC: 123 MG/DL — HIGH (ref 70–99)
HAV IGM SER-ACNC: SIGNIFICANT CHANGE UP
HBV CORE IGM SER-ACNC: SIGNIFICANT CHANGE UP
HBV SURFACE AG SER-ACNC: SIGNIFICANT CHANGE UP
HCT VFR BLD CALC: 36.3 % — SIGNIFICANT CHANGE UP (ref 34.5–45)
HCV AB S/CO SERPL IA: 0.19 S/CO — SIGNIFICANT CHANGE UP (ref 0–0.99)
HCV AB SERPL-IMP: SIGNIFICANT CHANGE UP
HGB BLD-MCNC: 12.2 G/DL — SIGNIFICANT CHANGE UP (ref 11.5–15.5)
LIDOCAIN IGE QN: 6 U/L — LOW (ref 22–51)
LYMPHOCYTES # BLD AUTO: 0.66 K/UL — LOW (ref 1–3.3)
LYMPHOCYTES # BLD AUTO: 3.5 % — LOW (ref 13–44)
MAGNESIUM SERPL-MCNC: 1.6 MG/DL — SIGNIFICANT CHANGE UP (ref 1.6–2.6)
MANUAL SMEAR VERIFICATION: SIGNIFICANT CHANGE UP
MCHC RBC-ENTMCNC: 28.8 PG — SIGNIFICANT CHANGE UP (ref 27–34)
MCHC RBC-ENTMCNC: 33.6 GM/DL — SIGNIFICANT CHANGE UP (ref 32–36)
MCV RBC AUTO: 85.8 FL — SIGNIFICANT CHANGE UP (ref 80–100)
MONOCYTES # BLD AUTO: 1.01 K/UL — HIGH (ref 0–0.9)
MONOCYTES NFR BLD AUTO: 5.3 % — SIGNIFICANT CHANGE UP (ref 2–14)
NEUTROPHILS # BLD AUTO: 17.3 K/UL — HIGH (ref 1.8–7.4)
NEUTROPHILS NFR BLD AUTO: 82.4 % — HIGH (ref 43–77)
NEUTS BAND # BLD: 8.8 % — HIGH (ref 0–8)
PHOSPHATE SERPL-MCNC: 4.4 MG/DL — SIGNIFICANT CHANGE UP (ref 2.4–4.7)
PLAT MORPH BLD: NORMAL — SIGNIFICANT CHANGE UP
PLATELET # BLD AUTO: 221 K/UL — SIGNIFICANT CHANGE UP (ref 150–400)
POTASSIUM SERPL-MCNC: 4.2 MMOL/L — SIGNIFICANT CHANGE UP (ref 3.5–5.3)
POTASSIUM SERPL-SCNC: 4.2 MMOL/L — SIGNIFICANT CHANGE UP (ref 3.5–5.3)
PROT SERPL-MCNC: 5.7 G/DL — LOW (ref 6.6–8.7)
RBC # BLD: 4.23 M/UL — SIGNIFICANT CHANGE UP (ref 3.8–5.2)
RBC # FLD: 15.5 % — HIGH (ref 10.3–14.5)
RBC BLD AUTO: NORMAL — SIGNIFICANT CHANGE UP
SMUDGE CELLS # BLD: PRESENT — SIGNIFICANT CHANGE UP
SODIUM SERPL-SCNC: 139 MMOL/L — SIGNIFICANT CHANGE UP (ref 135–145)
WBC # BLD: 18.97 K/UL — HIGH (ref 3.8–10.5)
WBC # FLD AUTO: 18.97 K/UL — HIGH (ref 3.8–10.5)

## 2024-10-23 PROCEDURE — 47562 LAPAROSCOPIC CHOLECYSTECTOMY: CPT

## 2024-10-23 PROCEDURE — 47562 LAPAROSCOPIC CHOLECYSTECTOMY: CPT | Mod: AS

## 2024-10-23 PROCEDURE — 43500 SURGICAL OPENING OF STOMACH: CPT | Mod: AS

## 2024-10-23 PROCEDURE — 76000 FLUOROSCOPY <1 HR PHYS/QHP: CPT | Mod: 26,59

## 2024-10-23 PROCEDURE — 88304 TISSUE EXAM BY PATHOLOGIST: CPT | Mod: 26

## 2024-10-23 PROCEDURE — 99222 1ST HOSP IP/OBS MODERATE 55: CPT | Mod: 25

## 2024-10-23 PROCEDURE — 43262 ENDO CHOLANGIOPANCREATOGRAPH: CPT | Mod: 59

## 2024-10-23 PROCEDURE — S2900 ROBOTIC SURGICAL SYSTEM: CPT | Mod: NC

## 2024-10-23 PROCEDURE — 43235 EGD DIAGNOSTIC BRUSH WASH: CPT | Mod: 59

## 2024-10-23 PROCEDURE — 43264 ERCP REMOVE DUCT CALCULI: CPT | Mod: 59

## 2024-10-23 PROCEDURE — 43500 SURGICAL OPENING OF STOMACH: CPT

## 2024-10-23 DEVICE — SPHINCTEROTOME W/ GWIRE COMPLETECTRL 20MM SHORT: Type: IMPLANTABLE DEVICE | Status: FUNCTIONAL

## 2024-10-23 DEVICE — SPHINCTERTOME JAG RX 39 PRELOADED CANN: Type: IMPLANTABLE DEVICE | Status: FUNCTIONAL

## 2024-10-23 DEVICE — LIGATING CLIPS WECK HEMOLOK POLYMER LARGE (PURPLE) 6: Type: IMPLANTABLE DEVICE | Status: FUNCTIONAL

## 2024-10-23 DEVICE — GWIRE VISIGLIDE ST TIP 270CM: Type: IMPLANTABLE DEVICE | Status: FUNCTIONAL

## 2024-10-23 DEVICE — CATH BLLN BIL RX 9-12CM: Type: IMPLANTABLE DEVICE | Status: FUNCTIONAL

## 2024-10-23 RX ORDER — ACETAMINOPHEN 500 MG
975 TABLET ORAL EVERY 6 HOURS
Refills: 0 | Status: DISCONTINUED | OUTPATIENT
Start: 2024-10-23 | End: 2024-10-25

## 2024-10-23 RX ORDER — DICLOFENAC SODIUM AND BENZALKONIUM CHLORIDE
2 KIT
Refills: 0 | DISCHARGE

## 2024-10-23 RX ORDER — ACETAMINOPHEN 500 MG
3 TABLET ORAL
Refills: 0 | DISCHARGE

## 2024-10-23 RX ORDER — PANTOPRAZOLE SODIUM 40 MG/1
40 TABLET, DELAYED RELEASE ORAL
Refills: 0 | Status: DISCONTINUED | OUTPATIENT
Start: 2024-10-23 | End: 2024-10-25

## 2024-10-23 RX ORDER — TRAZODONE HYDROCHLORIDE 100 MG/1
2 TABLET ORAL
Refills: 0 | DISCHARGE

## 2024-10-23 RX ORDER — ENOXAPARIN SODIUM 40MG/0.4ML
40 SYRINGE (ML) SUBCUTANEOUS EVERY 24 HOURS
Refills: 0 | Status: DISCONTINUED | OUTPATIENT
Start: 2024-10-23 | End: 2024-10-25

## 2024-10-23 RX ORDER — FLUOXETINE HCL 10 MG
40 CAPSULE ORAL AT BEDTIME
Refills: 0 | Status: DISCONTINUED | OUTPATIENT
Start: 2024-10-23 | End: 2024-10-25

## 2024-10-23 RX ORDER — HYDROMORPHONE HCL/0.9% NACL/PF 6 MG/30 ML
0.5 PATIENT CONTROLLED ANALGESIA SYRINGE INTRAVENOUS
Refills: 0 | Status: DISCONTINUED | OUTPATIENT
Start: 2024-10-23 | End: 2024-10-23

## 2024-10-23 RX ORDER — OMEPRAZOLE 10 MG
1 CAPSULE,DELAYED RELEASE (ENTERIC COATED) ORAL
Refills: 0 | DISCHARGE

## 2024-10-23 RX ORDER — MAGNESIUM SULFATE IN 0.9% NACL 2 G/50 ML
2 INTRAVENOUS SOLUTION, PIGGYBACK (ML) INTRAVENOUS ONCE
Refills: 0 | Status: COMPLETED | OUTPATIENT
Start: 2024-10-23 | End: 2024-10-23

## 2024-10-23 RX ORDER — ENOXAPARIN SODIUM 40MG/0.4ML
40 SYRINGE (ML) SUBCUTANEOUS ONCE
Refills: 0 | Status: DISCONTINUED | OUTPATIENT
Start: 2024-10-23 | End: 2024-10-23

## 2024-10-23 RX ORDER — ONDANSETRON HYDROCHLORIDE 2 MG/ML
4 INJECTION, SOLUTION INTRAMUSCULAR; INTRAVENOUS ONCE
Refills: 0 | Status: DISCONTINUED | OUTPATIENT
Start: 2024-10-23 | End: 2024-10-23

## 2024-10-23 RX ORDER — FLUOXETINE HCL 10 MG
1 CAPSULE ORAL
Refills: 0 | DISCHARGE

## 2024-10-23 RX ORDER — KETOROLAC TROMETHAMINE 5 MG/ML
1 SOLUTION OPHTHALMIC
Refills: 0 | DISCHARGE

## 2024-10-23 RX ORDER — PIPERACILLIN AND TAZOBACTAM .5; 4 G/20ML; G/20ML
3.38 INJECTION, POWDER, LYOPHILIZED, FOR SOLUTION INTRAVENOUS EVERY 8 HOURS
Refills: 0 | Status: DISCONTINUED | OUTPATIENT
Start: 2024-10-23 | End: 2024-10-25

## 2024-10-23 RX ORDER — FENTANYL CITRAT/DEXTROSE 5%/PF 1250MCG/50
50 PATIENT CONTROLLED ANALGESIA SYRINGE INTRAVENOUS
Refills: 0 | Status: DISCONTINUED | OUTPATIENT
Start: 2024-10-23 | End: 2024-10-23

## 2024-10-23 RX ORDER — SUMATRIPTAN SUCCINATE 6 MG/.5ML
1 INJECTION, SOLUTION SUBCUTANEOUS
Refills: 0 | DISCHARGE

## 2024-10-23 RX ORDER — OFLOXACIN 3 MG/ML
1 SOLUTION OPHTHALMIC
Refills: 0 | DISCHARGE

## 2024-10-23 RX ORDER — ACETAMINOPHEN 500 MG
1000 TABLET ORAL ONCE
Refills: 0 | Status: COMPLETED | OUTPATIENT
Start: 2024-10-23 | End: 2024-10-23

## 2024-10-23 RX ADMIN — Medication 400 MILLIGRAM(S): at 05:37

## 2024-10-23 RX ADMIN — Medication 975 MILLIGRAM(S): at 23:30

## 2024-10-23 RX ADMIN — PIPERACILLIN AND TAZOBACTAM 25 GRAM(S): .5; 4 INJECTION, POWDER, LYOPHILIZED, FOR SOLUTION INTRAVENOUS at 21:28

## 2024-10-23 RX ADMIN — Medication 400 MILLIGRAM(S): at 20:24

## 2024-10-23 RX ADMIN — Medication 100 MILLIGRAM(S): at 23:30

## 2024-10-23 RX ADMIN — PIPERACILLIN AND TAZOBACTAM 25 GRAM(S): .5; 4 INJECTION, POWDER, LYOPHILIZED, FOR SOLUTION INTRAVENOUS at 14:28

## 2024-10-23 RX ADMIN — PIPERACILLIN AND TAZOBACTAM 25 GRAM(S): .5; 4 INJECTION, POWDER, LYOPHILIZED, FOR SOLUTION INTRAVENOUS at 08:39

## 2024-10-23 RX ADMIN — Medication 75 MILLILITER(S): at 20:56

## 2024-10-23 RX ADMIN — Medication 0.5 MILLIGRAM(S): at 20:44

## 2024-10-23 RX ADMIN — Medication 0.5 MILLIGRAM(S): at 20:38

## 2024-10-23 RX ADMIN — Medication 25 GRAM(S): at 11:39

## 2024-10-23 RX ADMIN — Medication 1000 MILLIGRAM(S): at 12:38

## 2024-10-23 RX ADMIN — PANTOPRAZOLE SODIUM 40 MILLIGRAM(S): 40 TABLET, DELAYED RELEASE ORAL at 05:37

## 2024-10-23 RX ADMIN — Medication 0.5 MILLIGRAM(S): at 20:26

## 2024-10-23 RX ADMIN — Medication 400 MILLIGRAM(S): at 11:40

## 2024-10-23 RX ADMIN — Medication 400 MILLIGRAM(S): at 01:01

## 2024-10-23 RX ADMIN — Medication 0.5 MILLIGRAM(S): at 21:16

## 2024-10-23 RX ADMIN — Medication 40 MILLIGRAM(S): at 23:30

## 2024-10-23 RX ADMIN — PANTOPRAZOLE SODIUM 40 MILLIGRAM(S): 40 TABLET, DELAYED RELEASE ORAL at 20:54

## 2024-10-23 RX ADMIN — PIPERACILLIN AND TAZOBACTAM 25 GRAM(S): .5; 4 INJECTION, POWDER, LYOPHILIZED, FOR SOLUTION INTRAVENOUS at 02:12

## 2024-10-23 RX ADMIN — Medication 110 MILLILITER(S): at 02:12

## 2024-10-23 RX ADMIN — Medication 1000 MILLIGRAM(S): at 20:38

## 2024-10-23 NOTE — CONSULT NOTE ADULT - NS ATTEND AMEND GEN_ALL_CORE FT
Patient is a 5-year-old woman with a past medical history of a gastric sleeve 5 years ago that was converted to gastric bypass recently who presents with abdominal pain.  She reports left upper quadrant abdominal pain nonradiating which began yesterday.  She reports nonbloody nonbilious emesis.  She presented to the ED for further evaluation where she was noted to have elevated liver enzymes and had an ultrasound with evidence of   cholelithiasis. Today patient feeling well, without abdominal pain or nausea or vomiting. Bilirubin continues to trend up today with bilirubin of 4.4 this morning.  She is hemodynamically stable at present.  Considering elevated bilirubin which is uptrending we will plan for ERCP.  Upon coordination with surgical team plan for procedure in the OR today.

## 2024-10-23 NOTE — CONSULT NOTE ADULT - SUBJECTIVE AND OBJECTIVE BOX
Chief Complaint:  Patient is a 59y old  Female who presents with a chief complaint of choledocholithiases (23 Oct 2024 08:00)    HPI:  This is a 58 y/o Female with PMHx of depression PSHx of Sleeve 5 years and gastric bypass April 2024 (at [facility]) wh present to ED with 2 days of abdominal pain associated with nausea , patient was recently diagnosed with marginal ulcer however states her Pain is different and not relieved by omeprazole, denies fevers/chills,      ROS: 10-system review is otherwise negative except HPI above.      PAST MEDICAL & SURGICAL HISTORY:  Migraines      History of hysterectomy        FAMILY HISTORY:  No pertinent family history in first degree relatives      Family history not pertinent as reviewed with the patient.    SOCIAL HISTORY:  Denies any toxic habits    ALLERGIES: NKA No Known Allergies        Home Medications:  amitriptyline 100 mg oral tablet: 1 tab(s) orally once a day (at bedtime) (22 Oct 2024 20:38)  FLUoxetine 40 mg oral capsule: 1 cap(s) orally once a day (at bedtime) (22 Oct 2024 20:38)      --------------------------------------------------------------------------------------------    PHYSICAL EXAM:   General: NAD, Lying in bed comfortably  Neuro: A+Ox3  HEENT: EOMI, PERRLA, MMM  Cardio: RRR  Resp: Non labored breathing on RA  GI/Abd: Soft, NT/ND, no rebound/guarding, no masses palpated  Vascular: All 4 extremities warm and well perfused.   Pelvis: stable  Musculoskeletal: All 4 extremities moving spontaneously, no limitations, no spinal tenderness.  --------------------------------------------------------------------------------------------    LABS                 14.6   9.28   )----------(  282       ( 22 Oct 2024 16:37 )               44.9      138    |  98     |  13.8   ----------------------------<  148        ( 22 Oct 2024 16:37 )  4.5     |  27.0   |  0.44     Ca    9.5        ( 22 Oct 2024 16:37 )    TPro  6.7    /  Alb  4.0    /  TBili  3.4    /  DBili  3.1    /  AST  1430   /  ALT  1414   /  AlkPhos  628    ( 22 Oct 2024 18:51 )    LIVER FUNCTIONS - ( 22 Oct 2024 18:51 )  Alb: 4.0 g/dL / Pro: 6.7 g/dL / ALK PHOS: 628 U/L / ALT: 1414 U/L / AST: 1430 U/L / GGT: x           PT/INR -  11.2 sec / 0.99 ratio   ( 22 Oct 2024 16:37 )       PTT -  35.2 sec   ( 22 Oct 2024 16:37 )  CAPILLARY BLOOD GLUCOSE        Urinalysis Basic - ( 22 Oct 2024 16:37 )    Color: x / Appearance: x / SG: x / pH: x  Gluc: 148 mg/dL / Ketone: x  / Bili: x / Urobili: x   Blood: x / Protein: x / Nitrite: x   Leuk Esterase: x / RBC: x / WBC x   Sq Epi: x / Non Sq Epi: x / Bacteria: x         (22 Oct 2024 22:55)      PAST MEDICAL & SURGICAL HISTORY:  Migraines      History of hysterectomy          REVIEW OF SYSTEMS:   General: Negative  HEENT: Negative  CV: Negative  Respiratory: Negative  GI: See HPI  : Negative  MSK: Negative  Hematologic: Negative  Skin: Negative    MEDICATIONS:   MEDICATIONS  (STANDING):  acetaminophen   IVPB .. 1000 milliGRAM(s) IV Intermittent every 6 hours  amitriptyline 100 milliGRAM(s) Oral at bedtime  FLUoxetine 40 milliGRAM(s) Oral at bedtime  lactated ringers. 1000 milliLiter(s) (110 mL/Hr) IV Continuous <Continuous>  magnesium sulfate  IVPB 2 Gram(s) IV Intermittent once  pantoprazole  Injectable 40 milliGRAM(s) IV Push two times a day  piperacillin/tazobactam IVPB.. 3.375 Gram(s) IV Intermittent every 8 hours    MEDICATIONS  (PRN):          DIET:  Diet, NPO:   Except Medications     Special Instructions for Nursing:  Except Medications (10-22-24 @ 19:47) [Active]          ALLERGIES:   Allergies    No Known Allergies    Intolerances        Substance Use:   (  ) never used  (  ) other:  Tobacco Usage:  (   ) never smoked   (   ) former smoker   (   ) current smoker  (     ) pack year  (        ) last cigarette date  Alcohol Usage:    Family History   IBD (  ) Yes   (  ) No  GI Malignancy (  )  Yes    (  ) No    Health Management  Last Colonoscopy:  Last Endoscopy:     VITAL SIGNS:   Vital Signs Last 24 Hrs  T(C): 36.9 (23 Oct 2024 07:28), Max: 39.4 (22 Oct 2024 20:33)  T(F): 98.4 (23 Oct 2024 07:28), Max: 103 (22 Oct 2024 20:33)  HR: 74 (23 Oct 2024 07:28) (71 - 118)  BP: 99/63 (23 Oct 2024 07:28) (99/63 - 145/90)  BP(mean): 75 (22 Oct 2024 20:33) (75 - 75)  RR: 18 (23 Oct 2024 07:28) (16 - 19)  SpO2: 94% (23 Oct 2024 07:28) (93% - 100%)    Parameters below as of 23 Oct 2024 07:28  Patient On (Oxygen Delivery Method): room air      I&O's Summary      PHYSICAL EXAM:   GENERAL:  No acute distress  HEENT:  NC/AT, conjunctiva clear, sclera anicteric  CHEST:  No increased effort  HEART:  Regular rate  ABDOMEN:  Soft, non-tender, non-distended, normoactive bowel sounds, no rebound or guarding  EXTREMITIES: No edema  SKIN:  Warm, dry  NEURO:  Calm, cooperative    LABS:                        12.2   18.97 )-----------( 221      ( 23 Oct 2024 06:30 )             36.3     Hemoglobin: 12.2 g/dL (10-23-24 @ 06:30)  Hemoglobin: 14.6 g/dL (10-22-24 @ 16:37)    10-23    139  |  101  |  13.6  ----------------------------<  123[H]  4.2   |  27.0  |  0.71    Ca    8.8      23 Oct 2024 06:30  Phos  4.4     10-23  Mg     1.6     10-23    TPro  5.7[L]  /  Alb  3.2[L]  /  TBili  4.4[H]  /  DBili  4.1[H]  /  AST  520[H]  /  ALT  995[H]  /  AlkPhos  485[H]  10-23    LIVER FUNCTIONS - ( 23 Oct 2024 06:30 )  Alb: 3.2 g/dL / Pro: 5.7 g/dL / ALK PHOS: 485 U/L / ALT: 995 U/L / AST: 520 U/L / GGT: x             PT/INR - ( 22 Oct 2024 16:37 )   PT: 11.2 sec;   INR: 0.99 ratio         PTT - ( 22 Oct 2024 16:37 )  PTT:35.2 sec    Lipase: 6 U/L (10-23-24 @ 06:30)  Lipase: 8 U/L (10-22-24 @ 16:37)                                    RADIOLOGY & ADDITIONAL STUDIES:      -- -- --   ACC: [id number] EXAM:  US ABDOMEN RT UPR QUADRANT   ORDERED BY: JOSE CANAS     PROCEDURE DATE:  10/22/2024          INTERPRETATION:  CLINICAL INFORMATION: Right upper quadrant pain    COMPARISON: Right upper quadrant ultrasound 9/10/2024.    TECHNIQUE: Sonography of the right upper quadrant.    FINDINGS:  Liver: Increased echogenicity. Liver is enlarged and measures 20.7 cm in   length.  Bile ducts: Normal caliber. Common bile duct measures 7 mm.  Gallbladder: Cholelithiasis.  No focal tenderness or evidence of   cholecystitis.  Pancreas: Poorly visualized.  Right kidney: 11.4 cm. No hydronephrosis. Upper pole cyst measuring 2.9 x   2.4 cm.  Ascites: None.  IVC: Visualized portions are within normal limits.    IMPRESSION:  Cholelithiasiswithout sonographic evidence of cholecystitis.    Hepatic steatosis and hepatomegaly.    --- End of Report ---            JEWELS CISNEROS MD; Attending Radiologist  This document has been electronically signed. Oct 22 2024  5:34PM     Chief Complaint:  Patient is a 59y old  Female who presents with a chief complaint of choledocholithiases (23 Oct 2024 08:00)    HPI:  This is a 60 y/o Female with PMHx of depression PSHx of Sleeve 5 years and gastric bypass April 2024 (at [facility]) who present to ED with 2 days of abdominal pain associated with nausea. GI consulted for elevated liver enzymes. Patient's daughter Chung served as  per patient request. Patient reports after dinner Monday night she started having abdominal pain that became progressively worse yesterday. States pain is in RUQ and radiates to back. Daughter reports she was taking omeprazole without any relief. Patient recently had an EGD/colonoscopy with GI Dr. Maddie Navarro with findings of a marginal ulcer. RUQ U/S abdomen with cholelithiasis without cholecystitis. Labs significant for elevated liver enzymes, CBD 7mm. Tbili 2.6--> 4.4, Alk phos 642-->485, AST/ALT 1893/1601-->520/995. Denies alcohol use. Takes 3mg Tylenol daily X 3weeks for arthritic pain per her daughter. Unsuccessful colonoscopy due to poor prep. Denies vomiting, fever, chills, hematemesis, jaundice, pruritis, hematochezia and melena.       PAST MEDICAL & SURGICAL HISTORY:  Migraines    History of hysterectomy      REVIEW OF SYSTEMS:   General: Negative  HEENT: Negative  CV: Negative  Respiratory: Negative  GI: See HPI  : Negative  MSK: Negative  Hematologic: Negative  Skin: Negative    MEDICATIONS:   MEDICATIONS  (STANDING):  acetaminophen   IVPB .. 1000 milliGRAM(s) IV Intermittent every 6 hours  amitriptyline 100 milliGRAM(s) Oral at bedtime  FLUoxetine 40 milliGRAM(s) Oral at bedtime  lactated ringers. 1000 milliLiter(s) (110 mL/Hr) IV Continuous <Continuous>  magnesium sulfate  IVPB 2 Gram(s) IV Intermittent once  pantoprazole  Injectable 40 milliGRAM(s) IV Push two times a day  piperacillin/tazobactam IVPB.. 3.375 Gram(s) IV Intermittent every 8 hours    MEDICATIONS  (PRN):          DIET:  Diet, NPO:   Except Medications     Special Instructions for Nursing:  Except Medications (10-22-24 @ 19:47) [Active]          ALLERGIES:   Allergies    No Known Allergies    Intolerances        Substance Use:   (  ) never used  (  ) other:  Tobacco Usage:  (   ) never smoked   (   ) former smoker   (   ) current smoker  (     ) pack year  (        ) last cigarette date  Alcohol Usage:    Family History   IBD (  ) Yes   (  ) No  GI Malignancy (  )  Yes    (  ) No    Health Management  Last Colonoscopy:  Last Endoscopy:     VITAL SIGNS:   Vital Signs Last 24 Hrs  T(C): 36.9 (23 Oct 2024 07:28), Max: 39.4 (22 Oct 2024 20:33)  T(F): 98.4 (23 Oct 2024 07:28), Max: 103 (22 Oct 2024 20:33)  HR: 74 (23 Oct 2024 07:28) (71 - 118)  BP: 99/63 (23 Oct 2024 07:28) (99/63 - 145/90)  BP(mean): 75 (22 Oct 2024 20:33) (75 - 75)  RR: 18 (23 Oct 2024 07:28) (16 - 19)  SpO2: 94% (23 Oct 2024 07:28) (93% - 100%)    Parameters below as of 23 Oct 2024 07:28  Patient On (Oxygen Delivery Method): room air      I&O's Summary      PHYSICAL EXAM:   GENERAL:  No acute distress  HEENT:  NC/AT, conjunctiva clear, sclera anicteric  CHEST:  No increased effort  HEART:  Regular rate  ABDOMEN:  Soft, non-tender, non-distended, normoactive bowel sounds, no rebound or guarding  EXTREMITIES: No edema  SKIN:  Warm, dry  NEURO:  Calm, cooperative    LABS:                        12.2   18.97 )-----------( 221      ( 23 Oct 2024 06:30 )             36.3     Hemoglobin: 12.2 g/dL (10-23-24 @ 06:30)  Hemoglobin: 14.6 g/dL (10-22-24 @ 16:37)    10-23    139  |  101  |  13.6  ----------------------------<  123[H]  4.2   |  27.0  |  0.71    Ca    8.8      23 Oct 2024 06:30  Phos  4.4     10-23  Mg     1.6     10-23    TPro  5.7[L]  /  Alb  3.2[L]  /  TBili  4.4[H]  /  DBili  4.1[H]  /  AST  520[H]  /  ALT  995[H]  /  AlkPhos  485[H]  10-23    LIVER FUNCTIONS - ( 23 Oct 2024 06:30 )  Alb: 3.2 g/dL / Pro: 5.7 g/dL / ALK PHOS: 485 U/L / ALT: 995 U/L / AST: 520 U/L / GGT: x             PT/INR - ( 22 Oct 2024 16:37 )   PT: 11.2 sec;   INR: 0.99 ratio         PTT - ( 22 Oct 2024 16:37 )  PTT:35.2 sec    Lipase: 6 U/L (10-23-24 @ 06:30)  Lipase: 8 U/L (10-22-24 @ 16:37)    RADIOLOGY & ADDITIONAL STUDIES:      -- -- --   ACC: [id number] EXAM:  US ABDOMEN RT UPR QUADRANT   ORDERED BY: JOSE CANAS     PROCEDURE DATE:  10/22/2024          INTERPRETATION:  CLINICAL INFORMATION: Right upper quadrant pain    COMPARISON: Right upper quadrant ultrasound 9/10/2024.    TECHNIQUE: Sonography of the right upper quadrant.    FINDINGS:  Liver: Increased echogenicity. Liver is enlarged and measures 20.7 cm in   length.  Bile ducts: Normal caliber. Common bile duct measures 7 mm.  Gallbladder: Cholelithiasis.  No focal tenderness or evidence of   cholecystitis.  Pancreas: Poorly visualized.  Right kidney: 11.4 cm. No hydronephrosis. Upper pole cyst measuring 2.9 x   2.4 cm.  Ascites: None.  IVC: Visualized portions are within normal limits.    IMPRESSION:  Cholelithiasis without sonographic evidence of cholecystitis.    Hepatic steatosis and hepatomegaly.    --- End of Report ---    JEWELS CISNEROS MD; Attending Radiologist  This document has been electronically signed. Oct 22 2024  5:34PM     Chief Complaint:  Patient is a 59y old  Female who presents with a chief complaint of choledocholithiases (23 Oct 2024 08:00)    HPI:  This is a 60 y/o Female with PMHx of depression PSHx of Sleeve 5 years and gastric bypass April 2024 (at [facility]) who present to ED with 2 days of abdominal pain associated with nausea. GI consulted for elevated liver enzymes. Patient's daughter Chung served as  per patient request. Patient reports after dinner Monday night she started having abdominal pain that became progressively worse yesterday. States pain is in RUQ and radiates to back. Daughter reports she was taking omeprazole without any relief. 20 pound weight loss since gastric bypass. Patient recently had an EGD/colonoscopy with GI Dr. Maddie Navarro with findings of a marginal ulcer. RUQ U/S abdomen with cholelithiasis without cholecystitis. Labs significant for elevated liver enzymes, CBD 7mm. Tbili 2.6--> 4.4, Alk phos 642-->485, AST/ALT 1893/1601-->520/995. Denies alcohol use. Takes 3mg Tylenol daily X 3weeks for arthritic pain per her daughter. Unsuccessful colonoscopy due to poor prep. Denies vomiting, fever, chills, hematemesis, jaundice, pruritis, hematochezia and melena.       PAST MEDICAL & SURGICAL HISTORY:  Migraines    History of hysterectomy      REVIEW OF SYSTEMS:   General: Negative  HEENT: Negative  CV: Negative  Respiratory: Negative  GI: See HPI  : Negative  MSK: Negative  Hematologic: Negative  Skin: Negative    MEDICATIONS:   MEDICATIONS  (STANDING):  acetaminophen   IVPB .. 1000 milliGRAM(s) IV Intermittent every 6 hours  amitriptyline 100 milliGRAM(s) Oral at bedtime  FLUoxetine 40 milliGRAM(s) Oral at bedtime  lactated ringers. 1000 milliLiter(s) (110 mL/Hr) IV Continuous <Continuous>  magnesium sulfate  IVPB 2 Gram(s) IV Intermittent once  pantoprazole  Injectable 40 milliGRAM(s) IV Push two times a day  piperacillin/tazobactam IVPB.. 3.375 Gram(s) IV Intermittent every 8 hours    MEDICATIONS  (PRN):          DIET:  Diet, NPO:   Except Medications     Special Instructions for Nursing:  Except Medications (10-22-24 @ 19:47) [Active]          ALLERGIES:   Allergies    No Known Allergies    Intolerances        Substance Use:   (  ) never used  (  ) other:  Tobacco Usage:  (   ) never smoked   (   ) former smoker   (   ) current smoker  (     ) pack year  (        ) last cigarette date  Alcohol Usage:    Family History   IBD (  ) Yes   (  ) No  GI Malignancy (  )  Yes    (  ) No    Health Management  Last Colonoscopy:  Last Endoscopy:     VITAL SIGNS:   Vital Signs Last 24 Hrs  T(C): 36.9 (23 Oct 2024 07:28), Max: 39.4 (22 Oct 2024 20:33)  T(F): 98.4 (23 Oct 2024 07:28), Max: 103 (22 Oct 2024 20:33)  HR: 74 (23 Oct 2024 07:28) (71 - 118)  BP: 99/63 (23 Oct 2024 07:28) (99/63 - 145/90)  BP(mean): 75 (22 Oct 2024 20:33) (75 - 75)  RR: 18 (23 Oct 2024 07:28) (16 - 19)  SpO2: 94% (23 Oct 2024 07:28) (93% - 100%)    Parameters below as of 23 Oct 2024 07:28  Patient On (Oxygen Delivery Method): room air      I&O's Summary      PHYSICAL EXAM:   GENERAL:  No acute distress  HEENT:  NC/AT, conjunctiva clear, sclera anicteric  CHEST:  No increased effort  HEART:  Regular rate  ABDOMEN:  Soft, non-tender, non-distended, normoactive bowel sounds, no rebound or guarding  EXTREMITIES: No edema  SKIN:  Warm, dry  NEURO:  Calm, cooperative    LABS:                        12.2   18.97 )-----------( 221      ( 23 Oct 2024 06:30 )             36.3     Hemoglobin: 12.2 g/dL (10-23-24 @ 06:30)  Hemoglobin: 14.6 g/dL (10-22-24 @ 16:37)    10-23    139  |  101  |  13.6  ----------------------------<  123[H]  4.2   |  27.0  |  0.71    Ca    8.8      23 Oct 2024 06:30  Phos  4.4     10-23  Mg     1.6     10-23    TPro  5.7[L]  /  Alb  3.2[L]  /  TBili  4.4[H]  /  DBili  4.1[H]  /  AST  520[H]  /  ALT  995[H]  /  AlkPhos  485[H]  10-23    LIVER FUNCTIONS - ( 23 Oct 2024 06:30 )  Alb: 3.2 g/dL / Pro: 5.7 g/dL / ALK PHOS: 485 U/L / ALT: 995 U/L / AST: 520 U/L / GGT: x             PT/INR - ( 22 Oct 2024 16:37 )   PT: 11.2 sec;   INR: 0.99 ratio         PTT - ( 22 Oct 2024 16:37 )  PTT:35.2 sec    Lipase: 6 U/L (10-23-24 @ 06:30)  Lipase: 8 U/L (10-22-24 @ 16:37)    RADIOLOGY & ADDITIONAL STUDIES:      -- -- --   ACC: [id number] EXAM:  US ABDOMEN RT UPR QUADRANT   ORDERED BY: JOSE CANAS     PROCEDURE DATE:  10/22/2024          INTERPRETATION:  CLINICAL INFORMATION: Right upper quadrant pain    COMPARISON: Right upper quadrant ultrasound 9/10/2024.    TECHNIQUE: Sonography of the right upper quadrant.    FINDINGS:  Liver: Increased echogenicity. Liver is enlarged and measures 20.7 cm in   length.  Bile ducts: Normal caliber. Common bile duct measures 7 mm.  Gallbladder: Cholelithiasis.  No focal tenderness or evidence of   cholecystitis.  Pancreas: Poorly visualized.  Right kidney: 11.4 cm. No hydronephrosis. Upper pole cyst measuring 2.9 x   2.4 cm.  Ascites: None.  IVC: Visualized portions are within normal limits.    IMPRESSION:  Cholelithiasis without sonographic evidence of cholecystitis.    Hepatic steatosis and hepatomegaly.    --- End of Report ---    JEWELS CISNEROS MD; Attending Radiologist  This document has been electronically signed. Oct 22 2024  5:34PM

## 2024-10-23 NOTE — BRIEF OPERATIVE NOTE - NSICDXBRIEFPROCEDURE_GEN_ALL_CORE_FT
PROCEDURES:  Robot-assisted cholecystectomy 23-Oct-2024 20:11:46  Jenni Hidalgo  Laparoscopic cholecystectomy with endoscopic retrograde cholangiopancreatography (ERCP) 23-Oct-2024 20:13:16 ROBOTIC ASSISTED ERCP Jenni Hidalgo

## 2024-10-23 NOTE — ASU PREOP CHECKLIST - HAIR REMOVAL
DISCHARGE SUMMARY   Patient: Trent Rubio  Medical Record Number: [id number]  YOB: 2023  Admit Date: 2022  Gestational Age: 40w2d  Discharge Date: 2022  Disposition: Discharged to Home    Outpatient Pediatrician: [facility] History:   Age: 28 year old   /Para:        MARY: 2022, by Ultrasound     steroids during pregnancy: None     Past Maternal History:   Past Medical History:   Diagnosis Date   • Anemia    • COVID       Family History: family history includes Patient is unaware of any medical problems in her brother, father, mother, and sister.   Social History     Tobacco Use   • Smoking status: Never   • Smokeless tobacco: Never   Substance Use Topics   • Alcohol use: Not Currently        Prenatal Medications: Home Medications:   Medications Prior to Admission   Medication Sig Dispense Refill   • Prenatal Vit-Fe Fumarate-FA (multivitamin & mineral w/folic acid- PRENATAL) 27-1 MG Tab Take 1 tablet by mouth daily.          Maternal Medications:  Information for the patient's mother:  Susan Rubio [[id number]]     Current Facility-Administered Medications   Medication   • ferrous sulfate (65 mg Fe per 325 mg) tablet 325 mg   • oxyTOCIN (PITOCIN) injection 10 Units   • oxyTOCIN (PITOCIN) 30 Units in sodium chloride 0.9% 500 mL   • acetaminophen (TYLENOL) tablet 650 mg   • ibuprofen (MOTRIN) tablet 600 mg   • measles-mumps-rubella vaccine 0.5 mL   • varicella virus (VARIVAX) live vaccine 0.5 mL   • diphtheria-pertussis (acellular)-tetanus (BOOSTRIX) vaccine 0.5 mL   • PRENATAL vitamin & mineral w/ folic acid 1 mg tablet 1 tablet   • simethicone (MYLICON) tablet 125 mg   • calcium carbonate (TUMS) chewable tablet 500 mg   • ondansetron (ZOFRAN) injection 4 mg   • hydroCORTisone-pramoxine (ANALPRAM-HC) 2.5-1 % rectal cream   • polyethylene glycol (MIRALAX) packet 17 g   • docusate sodium-sennosides (SENOKOT S) 50-8.6 MG 2 tablet   •  bisacodyl (DULCOLAX) suppository 10 mg   • magnesium hydroxide (MILK OF MAGNESIA) 400 MG/5ML suspension 30 mL        Prenatal Labs:    Information for the patient's mother:  Susna Rubio [[id number]]     Recent Labs   Lab 22  0000 22  0000   HIV Antigen/Antibody Screen NR NR   RPR Screen NR NR   Rubella Antibody IgG  --  IMMUNE        GBS:  GBS unknown No antibiotics needed. Name of Antibiotics: NA    Additional maternal history:  #UTI in pregnancy   - s/p treatment   #Late/Scant PNC    Medications Prior to Delivery:     Delivery Vaginal, Spontaneous at 3:21 AM on 2022          Events of Labor:  Rupture date & time: 2022 8:50 PM   Rupture type: Spontaneous   Fluid color: Clear   Antibiotics given in labor? No   Induction: Misoprostol Premature ROM   Labor complications: None     Placenta appearance: Intact   Cord vessels: 3 Vessels   Cord complications None   Indications for :     Presentation/position: Vertex Left Occiput Anterior   Forceps attempted? No     Vacuum attempted? No     Shoulder dystocia? No                     Blood gases sent? Yes     Delivery Clinician:  MARI LUNA [[id number]]     INFORMATION   Resuscitation:  Suctioning     Apgars 1 and 5 minutes:      Erythromycin: Yes  Vitamin K: Yes    Heme:  Mom blood type: O+ Baby blood type O+ Yvette negative. Pt monitored for hyperbilirubinemia and did not require phototherapy from.   Her last bilirubin:     TCB HOL25, 4.6 mg/dL, LL 15, If di  Discharging < 72 hours, then follow-up within 3 days. Recheck TSB or TcB according to clinical judgment.     ADMISSION DISCHARGE   Admit: 2022 Discharge: 2022      Admit Age: 0 hours Discharge Age: 2 day old    Birthweight: 5 lb 6.8 oz (2460 g)  Discharge Weight: 2470 g (22)       Length: 44.5 cm  Length: 17.5\" (44.5 cm) (Filed from Delivery Summary) (22 0321)     Head Circumference: 33.5 cm  Head Circumference: 33.5 cm (13.19\") (Filed from Delivery  Summary) (12/12/22 0321)      DISCHARGE PHYSICAL EXAM     Vitals 24 Hour Range   Temperature   Temp  Min: 98 °F (36.7 °C)  Max: 99 °F (37.2 °C)   Pulse   Pulse  Min: 124  Max: 130   Respiratory   Resp  Min: 32  Max: 48   Blood Pressure   No data recorded   Pulse Oximetry    No data recorded     Vitals signs Flow sheet reviewed    Physical Exam  Vitals and nursing note reviewed.   Constitutional:       General: She is sleeping.      Appearance: Normal appearance.   HENT:      Head: Normocephalic. Anterior fontanelle is flat.      Right Ear: External ear normal.      Left Ear: External ear normal.      Nose: Nose normal. No congestion.      Mouth/Throat:      Mouth: Mucous membranes are moist.      Neck: Normal range of motion.   Eyes:      General:         Right eye: No discharge.         Left eye: No discharge.   Cardiovascular:      Rate and Rhythm: Normal rate and regular rhythm.      Heart sounds: Normal heart sounds. No murmur heard.  Pulmonary:      Effort: Pulmonary effort is normal. No respiratory distress.      Breath sounds: Normal breath sounds.   Abdominal:      General: Abdomen is flat. There is no distension.      Palpations: Abdomen is soft. There is no mass.      Hernia: No hernia is present.      Comments: Umbilical stump dry, no signs of erythema or infection   Genitourinary:     General: Normal vulva.      Rectum: Normal.   Musculoskeletal:         General: Normal range of motion.      Right hip: Negative right Ortolani and negative right Gilbert.      Left hip: Negative left Ortolani and negative left Gilbert.   Skin:     General: Skin is warm.      Turgor: Normal.      Coloration: Skin is not cyanotic or jaundiced.      Findings: No rash.   Neurological:      General: No focal deficit present.      Primitive Reflexes: Suck normal. Symmetric Sukumar.          SCREENINGS and PROCEDURES   ment and Plan   Immunizations:   Most Recent Immunizations   Administered Date(s) Administered   • Hep B, adolescent  or pediatric 2022     [place] Hearing Test: Pass R, Pass L (22 1530)  Car Seat Screen: Pass (22 1697)  CCHD Screening:   Screening complete: Done (22 045)  Right hand reading %: 99 %  Foot reading %: 100 %  CHD: Normal    screen: Sent, results pending    ASSESSMENT and PLAN     Girl Susan Rubio is a 2 day old old former Gestational Age: 40w2d, date of birth 2023, birthweight 2460 g female infant admitted 2022  3:21 AM. Weight down 0% at time of discharge. Baby tolerated feedings well. Bilirubin level normal, repeat per clinical judgement. Anticipatory guidance given and answered all parent questions.     Patient Active Problem List   Diagnosis   • [place] infant of 40 completed weeks of gestation       Outpatient Management:  1) Follow-up Pediatrician is at [facility] at [place].  Appointment scheduled for  @7:00am  2) Feeds: Formula 2-3 ounces every 2-3 hours  3) Discussed feeding, safe sleep, car seat, fevers, umbilical cord management and vitamin D    Jamia Perkins D.O.   Pediatrics PGY-2  Contact via [employer]           hair removal not indicated

## 2024-10-23 NOTE — BRIEF OPERATIVE NOTE - NSICDXBRIEFPOSTOP_GEN_ALL_CORE_FT
POST-OP DIAGNOSIS:  Cholelithiasis 23-Oct-2024 20:16:57  Jenni Hidalgo  Choledocholithiasis 23-Oct-2024 20:17:05  Jenni Hidalgo

## 2024-10-23 NOTE — PHARMACOTHERAPY INTERVENTION NOTE - COMMENTS
Referenced outpatient medical fill records and spoke with patient at bedside to obtain medication list.    Referenced outpatient medical fill records and spoke with patient at bedside to obtain medication list. Of note, patient states they can not tolerate ibuprofen and aspirin due to an ulcer.

## 2024-10-23 NOTE — BRIEF OPERATIVE NOTE - NSICDXBRIEFPREOP_GEN_ALL_CORE_FT
PRE-OP DIAGNOSIS:  Cholelithiasis 23-Oct-2024 20:13:37  Jenni Hidalgo  Choledocholithiasis 23-Oct-2024 20:14:34  Jenni Hidalgo

## 2024-10-23 NOTE — CONSULT NOTE ADULT - ASSESSMENT
This is a 60 y/o Female with PMHx of depression PSHx of Sleeve 5 years and gastric bypass April 2024 (at [facility]) who present to ED with 2 days of abdominal pain associated with nausea. GI consulted for elevated liver enzymes    #li This is a 58 y/o Female with PMHx of depression PSHx of Sleeve 5 years and gastric bypass April 2024 (at [facility]) who present to ED with 2 days of abdominal pain associated with nausea. GI consulted for elevated liver enzymes    #abd pain  #elevated liver enzymes  #Hx Gastric bypass (4/2024)  #Hx gastric sleeve  RUQ US Abd (10.22.2024) Cholelithiasis without sonographic evidence of cholecystitis.  Hepatic steatosis and hepatomegaly. CBD 7mm  Tbili 4.4 this morning   AST//995     - Trend CBC, CMP daily   - Avoid hepatotoxins   - Recommend MR MRCP to r/o choledocholithiasis   - Pain management per primary team  - Plan for lap assisted ERCP tomorrow if MR MRCP with choledocholithiasis   - NPO at midnight   - Active T&S, INR < 1.5, platelet > 50  _________________________________________________________________  Assessment and recommendations are final when note is signed by the attending physician.

## 2024-10-24 LAB
ALBUMIN SERPL ELPH-MCNC: 3.4 G/DL — SIGNIFICANT CHANGE UP (ref 3.3–5.2)
ALP SERPL-CCNC: 432 U/L — HIGH (ref 40–120)
ALT FLD-CCNC: 622 U/L — HIGH
ANION GAP SERPL CALC-SCNC: 10 MMOL/L — SIGNIFICANT CHANGE UP (ref 5–17)
AST SERPL-CCNC: 159 U/L — HIGH
BASOPHILS # BLD AUTO: 0.04 K/UL — SIGNIFICANT CHANGE UP (ref 0–0.2)
BASOPHILS NFR BLD AUTO: 0.3 % — SIGNIFICANT CHANGE UP (ref 0–2)
BILIRUB DIRECT SERPL-MCNC: 3.4 MG/DL — HIGH (ref 0–0.3)
BILIRUB INDIRECT FLD-MCNC: 0.2 MG/DL — SIGNIFICANT CHANGE UP (ref 0.2–1)
BILIRUB SERPL-MCNC: 3.6 MG/DL — HIGH (ref 0.4–2)
BUN SERPL-MCNC: 14.5 MG/DL — SIGNIFICANT CHANGE UP (ref 8–20)
CALCIUM SERPL-MCNC: 8.9 MG/DL — SIGNIFICANT CHANGE UP (ref 8.4–10.5)
CHLORIDE SERPL-SCNC: 103 MMOL/L — SIGNIFICANT CHANGE UP (ref 96–108)
CO2 SERPL-SCNC: 27 MMOL/L — SIGNIFICANT CHANGE UP (ref 22–29)
CREAT SERPL-MCNC: 0.54 MG/DL — SIGNIFICANT CHANGE UP (ref 0.5–1.3)
EGFR: 105 ML/MIN/1.73M2 — SIGNIFICANT CHANGE UP
EOSINOPHIL # BLD AUTO: 0.01 K/UL — SIGNIFICANT CHANGE UP (ref 0–0.5)
EOSINOPHIL NFR BLD AUTO: 0.1 % — SIGNIFICANT CHANGE UP (ref 0–6)
GLUCOSE SERPL-MCNC: 132 MG/DL — HIGH (ref 70–99)
HCT VFR BLD CALC: 37.9 % — SIGNIFICANT CHANGE UP (ref 34.5–45)
HGB BLD-MCNC: 12.3 G/DL — SIGNIFICANT CHANGE UP (ref 11.5–15.5)
IMM GRANULOCYTES NFR BLD AUTO: 0.4 % — SIGNIFICANT CHANGE UP (ref 0–0.9)
LYMPHOCYTES # BLD AUTO: 0.5 K/UL — LOW (ref 1–3.3)
LYMPHOCYTES # BLD AUTO: 3.7 % — LOW (ref 13–44)
MAGNESIUM SERPL-MCNC: 2.1 MG/DL — SIGNIFICANT CHANGE UP (ref 1.6–2.6)
MCHC RBC-ENTMCNC: 27.6 PG — SIGNIFICANT CHANGE UP (ref 27–34)
MCHC RBC-ENTMCNC: 32.5 GM/DL — SIGNIFICANT CHANGE UP (ref 32–36)
MCV RBC AUTO: 85 FL — SIGNIFICANT CHANGE UP (ref 80–100)
MONOCYTES # BLD AUTO: 0.36 K/UL — SIGNIFICANT CHANGE UP (ref 0–0.9)
MONOCYTES NFR BLD AUTO: 2.7 % — SIGNIFICANT CHANGE UP (ref 2–14)
NEUTROPHILS # BLD AUTO: 12.59 K/UL — HIGH (ref 1.8–7.4)
NEUTROPHILS NFR BLD AUTO: 92.8 % — HIGH (ref 43–77)
PHOSPHATE SERPL-MCNC: 2.8 MG/DL — SIGNIFICANT CHANGE UP (ref 2.4–4.7)
PLATELET # BLD AUTO: 192 K/UL — SIGNIFICANT CHANGE UP (ref 150–400)
POTASSIUM SERPL-MCNC: 4.3 MMOL/L — SIGNIFICANT CHANGE UP (ref 3.5–5.3)
POTASSIUM SERPL-SCNC: 4.3 MMOL/L — SIGNIFICANT CHANGE UP (ref 3.5–5.3)
PROT SERPL-MCNC: 6.3 G/DL — LOW (ref 6.6–8.7)
RBC # BLD: 4.46 M/UL — SIGNIFICANT CHANGE UP (ref 3.8–5.2)
RBC # FLD: 15.8 % — HIGH (ref 10.3–14.5)
SODIUM SERPL-SCNC: 140 MMOL/L — SIGNIFICANT CHANGE UP (ref 135–145)
WBC # BLD: 13.55 K/UL — HIGH (ref 3.8–10.5)
WBC # FLD AUTO: 13.55 K/UL — HIGH (ref 3.8–10.5)

## 2024-10-24 PROCEDURE — 99232 SBSQ HOSP IP/OBS MODERATE 35: CPT

## 2024-10-24 RX ORDER — DIPHENHYDRAMINE HCL 12.5MG/5ML
25 ELIXIR ORAL ONCE
Refills: 0 | Status: COMPLETED | OUTPATIENT
Start: 2024-10-24 | End: 2024-10-24

## 2024-10-24 RX ORDER — SUMATRIPTAN SUCCINATE 6 MG/.5ML
100 INJECTION, SOLUTION SUBCUTANEOUS ONCE
Refills: 0 | Status: DISCONTINUED | OUTPATIENT
Start: 2024-10-24 | End: 2024-10-25

## 2024-10-24 RX ORDER — KETOROLAC TROMETHAMINE 30 MG/ML
15 INJECTION INTRAMUSCULAR; INTRAVENOUS ONCE
Refills: 0 | Status: DISCONTINUED | OUTPATIENT
Start: 2024-10-24 | End: 2024-10-24

## 2024-10-24 RX ADMIN — PIPERACILLIN AND TAZOBACTAM 25 GRAM(S): .5; 4 INJECTION, POWDER, LYOPHILIZED, FOR SOLUTION INTRAVENOUS at 21:27

## 2024-10-24 RX ADMIN — PIPERACILLIN AND TAZOBACTAM 25 GRAM(S): .5; 4 INJECTION, POWDER, LYOPHILIZED, FOR SOLUTION INTRAVENOUS at 13:45

## 2024-10-24 RX ADMIN — Medication 25 MILLIGRAM(S): at 16:53

## 2024-10-24 RX ADMIN — Medication 975 MILLIGRAM(S): at 12:31

## 2024-10-24 RX ADMIN — Medication 40 MILLIGRAM(S): at 21:28

## 2024-10-24 RX ADMIN — KETOROLAC TROMETHAMINE 15 MILLIGRAM(S): 30 INJECTION INTRAMUSCULAR; INTRAVENOUS at 07:59

## 2024-10-24 RX ADMIN — PIPERACILLIN AND TAZOBACTAM 25 GRAM(S): .5; 4 INJECTION, POWDER, LYOPHILIZED, FOR SOLUTION INTRAVENOUS at 05:48

## 2024-10-24 RX ADMIN — Medication 975 MILLIGRAM(S): at 18:15

## 2024-10-24 RX ADMIN — Medication 975 MILLIGRAM(S): at 05:49

## 2024-10-24 RX ADMIN — Medication 40 MILLIGRAM(S): at 05:49

## 2024-10-24 RX ADMIN — PANTOPRAZOLE SODIUM 40 MILLIGRAM(S): 40 TABLET, DELAYED RELEASE ORAL at 18:15

## 2024-10-24 RX ADMIN — Medication 100 MILLIGRAM(S): at 21:28

## 2024-10-24 RX ADMIN — PANTOPRAZOLE SODIUM 40 MILLIGRAM(S): 40 TABLET, DELAYED RELEASE ORAL at 05:49

## 2024-10-24 NOTE — CONSULT NOTE ADULT - SUBJECTIVE AND OBJECTIVE BOX
Patient is a 59y old  Female who presents with a chief complaint of choledocholithiases (24 Oct 2024 10:30)      HPI:      HPI: 59y Female with PMHx of depression PSHx of Sleeve 5 years and gastric bypass April 2024 (at [facility]) present to ED with 2 days of abdominal pain associated with nausea , patient was recently diagnosed with marginal ulcer however states her Pain is different and not relieved by omeprazole, denies fevers/chills,          Analgesic Dosing for past 24 hours reviewed as below:  acetaminophen     Tablet ..   975 milliGRAM(s) Oral (10-24-24 @ 12:31)   975 milliGRAM(s) Oral (10-24-24 @ 05:49)   975 milliGRAM(s) Oral (10-23-24 @ 23:30)    acetaminophen   IVPB ..   400 mL/Hr IV Intermittent (10-23-24 @ 20:24)    amitriptyline   100 milliGRAM(s) Oral (10-23-24 @ 23:30)    FLUoxetine   40 milliGRAM(s) Oral (10-23-24 @ 23:30)    HYDROmorphone  Injectable   0.5 milliGRAM(s) IV Push (10-23-24 @ 20:44)   0.5 milliGRAM(s) IV Push (10-23-24 @ 20:26)    ketorolac   Injectable   15 milliGRAM(s) IV Push (10-24-24 @ 07:59)          T(C): 37 (10-24-24 @ 09:27), Max: 37 (10-24-24 @ 09:27)  HR: 81 (10-24-24 @ 09:27) (71 - 84)  BP: 112/76 (10-24-24 @ 09:27) (112/76 - 133/81)  RR: 18 (10-24-24 @ 09:27) (12 - 18)  SpO2: 96% (10-24-24 @ 09:27) (92% - 98%)      10-23-24 @ 07:01  -  10-24-24 @ 07:00  --------------------------------------------------------  IN: 480 mL / OUT: 725 mL / NET: -245 mL        acetaminophen     Tablet .. 975 milliGRAM(s) Oral every 6 hours  amitriptyline 100 milliGRAM(s) Oral at bedtime  enoxaparin Injectable 40 milliGRAM(s) SubCutaneous every 24 hours  FLUoxetine 40 milliGRAM(s) Oral at bedtime  pantoprazole  Injectable 40 milliGRAM(s) IV Push two times a day  piperacillin/tazobactam IVPB.. 3.375 Gram(s) IV Intermittent every 8 hours  SUMAtriptan 100 milliGRAM(s) Oral once PRN                          12.3   13.55 )-----------( 192      ( 24 Oct 2024 05:53 )             37.9     10-24    140  |  103  |  14.5  ----------------------------<  132[H]  4.3   |  27.0  |  0.54    Ca    8.9      24 Oct 2024 05:53  Phos  2.8     10-24  Mg     2.1     10-24    TPro  6.3[L]  /  Alb  3.4  /  TBili  3.6[H]  /  DBili  3.4[H]  /  AST  159[H]  /  ALT  622[H]  /  AlkPhos  432[H]  10-24    PT/INR - ( 22 Oct 2024 16:37 )   PT: 11.2 sec;   INR: 0.99 ratio         PTT - ( 22 Oct 2024 16:37 )  PTT:35.2 sec  Urinalysis Basic - ( 24 Oct 2024 05:53 )    Color: x / Appearance: x / SG: x / pH: x  Gluc: 132 mg/dL / Ketone: x  / Bili: x / Urobili: x   Blood: x / Protein: x / Nitrite: x   Leuk Esterase: x / RBC: x / WBC x   Sq Epi: x / Non Sq Epi: x / Bacteria: x        Pain Service   [phone]

## 2024-10-24 NOTE — CHART NOTE - NSCHARTNOTEFT_GEN_A_CORE
Post-Operative Check    Patient is a 59 year old F who is s/p a robotic cholecystectomy with laparoscopic ERCP.  - Patient resting in bed.  - Denies pain at this time; denies nausea, vomiting, SOB, chest pain.   - Patient has not yet had a bowel movement of passed flatus.    Vitals:  Vital Signs Last 24 Hrs  T(C): 36.8 (23 Oct 2024 23:40), Max: 37.6 (23 Oct 2024 14:49)  T(F): 98.3 (23 Oct 2024 23:40), Max: 99.6 (23 Oct 2024 14:49)  HR: 71 (23 Oct 2024 23:40) (71 - 82)  BP: 125/77 (23 Oct 2024 23:40) (99/63 - 133/81)  BP(mean): 86 (23 Oct 2024 21:30) (86 - 96)  RR: 18 (23 Oct 2024 23:40) (12 - 18)  SpO2: 94% (23 Oct 2024 23:40) (92% - 98%)    Parameters below as of 23 Oct 2024 23:40  Patient On (Oxygen Delivery Method): room air      Physical exam:  General: patient resting in bed comfortably, in NAD  Resp: equal chest rise bilaterally, nonlabored breathing  Abdomen: soft, non tender, non distended; surgical incisions with Dermabond dressing in place c/d/i    Assessment: A 59 year old F who is s/p from a robotic cholecystectomy and lap ERCP, doing well post-operatively.     Plan:   - continue diet as tolerated, low fat  - pain control PRN  - encourage OOB, ambulation, IS  - chemical dvt prophylaxis  - eduardo Uriostegui, PGY-1  Surgery Resident

## 2024-10-24 NOTE — CONSULT NOTE ADULT - ASSESSMENT
59F  s/p Robot-assisted cholecystectomy 23-Oct-2024      Patient interested in a nerve block procedure as an addition to their current analgesic therapy.   The patient has not experienced satisfactory relief from other treatment modalities including analgesic pain medications.  The risks, benefits and alternatives of a nerve block were discussed with the patient.  The benefits include hopeful relief of pain.  The alternatives include avoiding the procedure and continuing treatment with non-medication therapies and medications, including increasing and/or changing current analgesic medications.  The patient understands that this is an invasive procedure and therefore there are inherent risks. The patient was informed of the risks of the procedure including but not limited to infection, bleeding, injury to nearby tissues, permanent nerve injury, stroke, no decrease in pain or worsened pain, and toxicity from local anesthetic medications.   No certain guarantees have been made and patient understands that responses can vary and repeat procedures may be necessary.

## 2024-10-24 NOTE — PROGRESS NOTE ADULT - NS ATTEND AMEND GEN_ALL_CORE FT
I evaluated this pt. with my ACP and agree with the above assessment and management plan. Pt had ERCP with ES and BSE in OR yesterday with clearance of her CBD prior to cholecystectomy. Pt. with abdominal pain this AM c/w her cholecystectomy yesterday. No clinical evidence of post ERCP pancreatitis. No plans or need for continued GI f/u. Post-op management as per Surgery.  I reviewed her ERCP report from yesterday.Signing off. Reconsult as needed. Thank you.

## 2024-10-25 ENCOUNTER — TRANSCRIPTION ENCOUNTER (OUTPATIENT)
Age: 59
End: 2024-10-25

## 2024-10-25 VITALS
HEART RATE: 76 BPM | TEMPERATURE: 99 F | SYSTOLIC BLOOD PRESSURE: 139 MMHG | RESPIRATION RATE: 14 BRPM | OXYGEN SATURATION: 93 % | DIASTOLIC BLOOD PRESSURE: 88 MMHG

## 2024-10-25 LAB
ALBUMIN SERPL ELPH-MCNC: 3 G/DL — LOW (ref 3.3–5.2)
ALP SERPL-CCNC: 388 U/L — HIGH (ref 40–120)
ALT FLD-CCNC: 385 U/L — HIGH
ANION GAP SERPL CALC-SCNC: 10 MMOL/L — SIGNIFICANT CHANGE UP (ref 5–17)
AST SERPL-CCNC: 69 U/L — HIGH
BASOPHILS # BLD AUTO: 0.02 K/UL — SIGNIFICANT CHANGE UP (ref 0–0.2)
BASOPHILS NFR BLD AUTO: 0.3 % — SIGNIFICANT CHANGE UP (ref 0–2)
BILIRUB DIRECT SERPL-MCNC: 2.9 MG/DL — HIGH (ref 0–0.3)
BILIRUB INDIRECT FLD-MCNC: 0.1 MG/DL — LOW (ref 0.2–1)
BILIRUB SERPL-MCNC: 3.1 MG/DL — HIGH (ref 0.4–2)
BUN SERPL-MCNC: 14.4 MG/DL — SIGNIFICANT CHANGE UP (ref 8–20)
CALCIUM SERPL-MCNC: 8.4 MG/DL — SIGNIFICANT CHANGE UP (ref 8.4–10.5)
CHLORIDE SERPL-SCNC: 103 MMOL/L — SIGNIFICANT CHANGE UP (ref 96–108)
CO2 SERPL-SCNC: 24 MMOL/L — SIGNIFICANT CHANGE UP (ref 22–29)
CREAT SERPL-MCNC: 0.46 MG/DL — LOW (ref 0.5–1.3)
EGFR: 109 ML/MIN/1.73M2 — SIGNIFICANT CHANGE UP
EOSINOPHIL # BLD AUTO: 0.06 K/UL — SIGNIFICANT CHANGE UP (ref 0–0.5)
EOSINOPHIL NFR BLD AUTO: 1 % — SIGNIFICANT CHANGE UP (ref 0–6)
GLUCOSE SERPL-MCNC: 108 MG/DL — HIGH (ref 70–99)
HCT VFR BLD CALC: 34.9 % — SIGNIFICANT CHANGE UP (ref 34.5–45)
HGB BLD-MCNC: 11.5 G/DL — SIGNIFICANT CHANGE UP (ref 11.5–15.5)
IMM GRANULOCYTES NFR BLD AUTO: 0.3 % — SIGNIFICANT CHANGE UP (ref 0–0.9)
LYMPHOCYTES # BLD AUTO: 0.58 K/UL — LOW (ref 1–3.3)
LYMPHOCYTES # BLD AUTO: 9.6 % — LOW (ref 13–44)
MAGNESIUM SERPL-MCNC: 1.9 MG/DL — SIGNIFICANT CHANGE UP (ref 1.6–2.6)
MCHC RBC-ENTMCNC: 27.8 PG — SIGNIFICANT CHANGE UP (ref 27–34)
MCHC RBC-ENTMCNC: 33 GM/DL — SIGNIFICANT CHANGE UP (ref 32–36)
MCV RBC AUTO: 84.5 FL — SIGNIFICANT CHANGE UP (ref 80–100)
MONOCYTES # BLD AUTO: 0.22 K/UL — SIGNIFICANT CHANGE UP (ref 0–0.9)
MONOCYTES NFR BLD AUTO: 3.6 % — SIGNIFICANT CHANGE UP (ref 2–14)
NEUTROPHILS # BLD AUTO: 5.17 K/UL — SIGNIFICANT CHANGE UP (ref 1.8–7.4)
NEUTROPHILS NFR BLD AUTO: 85.2 % — HIGH (ref 43–77)
PHOSPHATE SERPL-MCNC: 1.4 MG/DL — LOW (ref 2.4–4.7)
PLATELET # BLD AUTO: 166 K/UL — SIGNIFICANT CHANGE UP (ref 150–400)
POTASSIUM SERPL-MCNC: 3.9 MMOL/L — SIGNIFICANT CHANGE UP (ref 3.5–5.3)
POTASSIUM SERPL-SCNC: 3.9 MMOL/L — SIGNIFICANT CHANGE UP (ref 3.5–5.3)
PROT SERPL-MCNC: 5.8 G/DL — LOW (ref 6.6–8.7)
RBC # BLD: 4.13 M/UL — SIGNIFICANT CHANGE UP (ref 3.8–5.2)
RBC # FLD: 15.9 % — HIGH (ref 10.3–14.5)
SODIUM SERPL-SCNC: 137 MMOL/L — SIGNIFICANT CHANGE UP (ref 135–145)
WBC # BLD: 6.07 K/UL — SIGNIFICANT CHANGE UP (ref 3.8–10.5)
WBC # FLD AUTO: 6.07 K/UL — SIGNIFICANT CHANGE UP (ref 3.8–10.5)

## 2024-10-25 PROCEDURE — 80048 BASIC METABOLIC PNL TOTAL CA: CPT

## 2024-10-25 PROCEDURE — 36415 COLL VENOUS BLD VENIPUNCTURE: CPT

## 2024-10-25 PROCEDURE — 88304 TISSUE EXAM BY PATHOLOGIST: CPT

## 2024-10-25 PROCEDURE — 84100 ASSAY OF PHOSPHORUS: CPT

## 2024-10-25 PROCEDURE — 96374 THER/PROPH/DIAG INJ IV PUSH: CPT

## 2024-10-25 PROCEDURE — 86703 HIV-1/HIV-2 1 RESULT ANTBDY: CPT

## 2024-10-25 PROCEDURE — S2900: CPT

## 2024-10-25 PROCEDURE — 80053 COMPREHEN METABOLIC PANEL: CPT

## 2024-10-25 PROCEDURE — 83735 ASSAY OF MAGNESIUM: CPT

## 2024-10-25 PROCEDURE — 93005 ELECTROCARDIOGRAM TRACING: CPT

## 2024-10-25 PROCEDURE — C1769: CPT

## 2024-10-25 PROCEDURE — C1773: CPT

## 2024-10-25 PROCEDURE — C1889: CPT

## 2024-10-25 PROCEDURE — 85610 PROTHROMBIN TIME: CPT

## 2024-10-25 PROCEDURE — C9399: CPT

## 2024-10-25 PROCEDURE — 83690 ASSAY OF LIPASE: CPT

## 2024-10-25 PROCEDURE — 82248 BILIRUBIN DIRECT: CPT

## 2024-10-25 PROCEDURE — 99285 EMERGENCY DEPT VISIT HI MDM: CPT

## 2024-10-25 PROCEDURE — 82962 GLUCOSE BLOOD TEST: CPT

## 2024-10-25 PROCEDURE — 76705 ECHO EXAM OF ABDOMEN: CPT

## 2024-10-25 PROCEDURE — T1013: CPT

## 2024-10-25 PROCEDURE — 80074 ACUTE HEPATITIS PANEL: CPT

## 2024-10-25 PROCEDURE — 85730 THROMBOPLASTIN TIME PARTIAL: CPT

## 2024-10-25 PROCEDURE — 76000 FLUOROSCOPY <1 HR PHYS/QHP: CPT

## 2024-10-25 PROCEDURE — 81001 URINALYSIS AUTO W/SCOPE: CPT

## 2024-10-25 PROCEDURE — 96375 TX/PRO/DX INJ NEW DRUG ADDON: CPT

## 2024-10-25 PROCEDURE — 85025 COMPLETE CBC W/AUTO DIFF WBC: CPT

## 2024-10-25 PROCEDURE — 80076 HEPATIC FUNCTION PANEL: CPT

## 2024-10-25 RX ADMIN — PANTOPRAZOLE SODIUM 40 MILLIGRAM(S): 40 TABLET, DELAYED RELEASE ORAL at 05:14

## 2024-10-25 RX ADMIN — Medication 975 MILLIGRAM(S): at 00:20

## 2024-10-25 RX ADMIN — PIPERACILLIN AND TAZOBACTAM 25 GRAM(S): .5; 4 INJECTION, POWDER, LYOPHILIZED, FOR SOLUTION INTRAVENOUS at 05:13

## 2024-10-25 RX ADMIN — Medication 40 MILLIGRAM(S): at 05:15

## 2024-10-25 RX ADMIN — Medication 975 MILLIGRAM(S): at 05:18

## 2024-10-25 NOTE — DISCHARGE NOTE PROVIDER - NSDCCPTREATMENT_GEN_ALL_CORE_FT
PRINCIPAL PROCEDURE  Procedure: Laparoscopic cholecystectomy with endoscopic retrograde cholangiopancreatography (ERCP)  Findings and Treatment: ROBOTIC ASSISTED ERCP

## 2024-10-25 NOTE — DISCHARGE NOTE PROVIDER - NSDCCPCAREPLAN_GEN_ALL_CORE_FT
PRINCIPAL DISCHARGE DIAGNOSIS  Diagnosis: Cholelithiases  Assessment and Plan of Treatment: BATHING: Please do not submerge wound underwater. You may shower and/or sponge bathe.   ACTIVITY: No heavy lifting or straining. Otherwise, you may return to your usual level of physical activity.   DIET: Return to your usual diet.  NOTIFY YOUR SURGEON IF: You have any bleeding that does not stop, any pus draining from your wound(s), any fever (over 100.4 F) or chills, persistent nausea/vomiting, persistent diarrhea, or if your pain is not controlled on your discharge pain medications.  FOLLOW-UP: Please follow up with your primary care physician and Acute Care Surgery clinic [phone] in 10-14 days regarding your hospitalization. Call for appointment upon discharge.      SECONDARY DISCHARGE DIAGNOSES  Diagnosis: Abnormal transaminases  Assessment and Plan of Treatment:

## 2024-10-25 NOTE — DISCHARGE NOTE NURSING/CASE MANAGEMENT/SOCIAL WORK - PATIENT PORTAL LINK FT
You can access the FollowMyHealth Patient Portal offered by [facility] by registering at the following website: [email or web address]. By joining [employer]’s FollowMyHealth portal, you will also be able to view your health information using other applications (apps) compatible with our system.

## 2024-10-25 NOTE — DISCHARGE NOTE NURSING/CASE MANAGEMENT/SOCIAL WORK - NSDCPEFALRISK_GEN_ALL_CORE
For information on Fall & Injury Prevention, visit: [email or web address] OR  [email or web address] OR  https://www.cdc.gov/steadi/patient.html

## 2024-10-25 NOTE — DISCHARGE NOTE PROVIDER - NSDCMRMEDTOKEN_GEN_ALL_CORE_FT
acetaminophen 325 mg oral tablet: 3 tab(s) orally every 8 hours as needed for mild pain  amitriptyline 100 mg oral tablet: 1 tab(s) orally once a day (at bedtime)  diclofenac 1% topical gel: Apply topically to affected area 2 times a day to right knee.  FLUoxetine 40 mg oral capsule: 1 cap(s) orally once a day (at bedtime)  ketorolac 0.5% ophthalmic solution: 1 drop(s) in each affected eye 3 times a day  ofloxacin 0.3% ophthalmic solution: 1 drop(s) in each affected eye 3 times a day  omeprazole 40 mg oral delayed release capsule: 1 cap(s) orally 2 times a day  prednisoLONE acetate 1% ophthalmic suspension: 1 drop(s) in the right eye 3 times a day  SUMAtriptan 100 mg oral tablet: 1 tab(s) orally once a day (at bedtime) as needed for migraine  traZODone 50 mg oral tablet: 2 tab(s) orally once a day (at bedtime) as needed for  insomnia

## 2024-10-25 NOTE — DISCHARGE NOTE PROVIDER - CARE PROVIDER_API CALL
Kyle Hassan Gurmeet  Surgery  [address] [place] [id number]  Phone: [phone]  Fax: [phone]  Follow Up Time:

## 2024-10-25 NOTE — DISCHARGE NOTE PROVIDER - HOSPITAL COURSE
Pt is a 60yo female with PMHx of depression PSHx of Sleeve 5 years and gastric bypass April 2024 (at [facility]) present to ED on 10/22 with 2 days of abdominal pain associated with nausea , patient was recently diagnosed with marginal ulcer with pain well controlled with home meds. Pt underwent labs and imaging and was found to have choledocholithiases The patient was admitted to undergo a RA ERCP and Cholecystectomy.  The patient tolerated the procedure well and was transferred to the floor in stable condition.  The patient's diet was advanced PO pain medication ordered. The pt was still having pain and received a TAP block on 10/24 which significantly improved her pain. Once the patient was ambulating well and voiding without difficulty, patient was found to be stable for discharge to home.  Pain was well-controlled at the time of discharge and the patient was tolerating a full diet.

## 2024-10-25 NOTE — PROGRESS NOTE ADULT - ASSESSMENT
ASSESSMENT: Patient is a 59y old female with history of gastric bypass presented of evidence choledocholithiases ( elevated T bili 2.6 and alk phos 642 and elevated LFTs) with evidence of cholelithiasis on US     PLAN:    - NPO/IVF  - GI consult for possible lap assisted ERCP   - plan for cholecystectomy this admisson   - pain control  - DVT ppx  - OOB/ambulate  - strict I/Os  
58 y/o Female with PMHx of depression PSHx of Sleeve 5 years and gastric bypass April 2024 (at [facility]) who present to ED with 2 days of abdominal pain associated with nausea. GI consulted for elevated liver enzymes.     #abd pain  #elevated liver enzymes  #Hx Gastric bypass (4/2024)  #Hx gastric sleeve  RUQ US Abd (10.22.2024) Cholelithiasis without sonographic evidence of cholecystitis.  S/p Lap assisted ERCP/ cholecystectomy yesterday. Liver enzymes trending down. No evidence of post ERCP pancreatitis. Leucocytosis improving.    Diet per Surgical team  Cont Protonix as GI mucosal protection   Avoid use of NSAIDs  GI will sign of at this time. Please call back with any questions or concern that requires inpatient evaluation    
A: 59F s/p RA april and ERCP for symptomatic cholelithiasis     P:  Pain control  Anti-emetics PRN  OOB as tolerated  Encourage IS  DVT PPx  AM labs/ monitor LFTs

## 2024-10-25 NOTE — PROGRESS NOTE ADULT - SUBJECTIVE AND OBJECTIVE BOX
Chief Complaint: This is a 59y old woman patient being seen in follow-up consultation for choledocholithiases    Interval HPI / 24H events:  Patient seen and evaluated at bedside, reports incisional pain. Denies abdominal pain, fever, chills, nausea, vomiting. Tolerating diet.     Review of Systems:  . Constitutional: No fever, chills  . HEENT: Negative  · Respiratory and Thorax: No shortness of breath, no cough  · Cardiovascular: No chest pain, palpitation, no dizziness  · Gastrointestinal: see above  · Genitourinary: No hematuria  · Musculoskeletal: Negative  · Neurological: negative  · Psychiatric: no agitation, no anxiety      PAST MEDICAL/SURGICAL HISTORY:  Migraines    History of hysterectomy      MEDICATIONS  (STANDING):  acetaminophen     Tablet .. 975 milliGRAM(s) Oral every 6 hours  amitriptyline 100 milliGRAM(s) Oral at bedtime  enoxaparin Injectable 40 milliGRAM(s) SubCutaneous every 24 hours  FLUoxetine 40 milliGRAM(s) Oral at bedtime  pantoprazole  Injectable 40 milliGRAM(s) IV Push two times a day  piperacillin/tazobactam IVPB.. 3.375 Gram(s) IV Intermittent every 8 hours    MEDICATIONS  (PRN):  SUMAtriptan 100 milliGRAM(s) Oral once PRN Migraine    No Known Allergies    T(C): 37 (10-24-24 @ 09:27), Max: 37.6 (10-23-24 @ 14:49)  HR: 81 (10-24-24 @ 09:27) (71 - 84)  BP: 112/76 (10-24-24 @ 09:27) (109/74 - 133/81)  RR: 18 (10-24-24 @ 09:27) (12 - 18)  SpO2: 96% (10-24-24 @ 09:27) (92% - 98%)    I&O's Summary    23 Oct 2024 07:01  -  24 Oct 2024 07:00  --------------------------------------------------------  IN: 480 mL / OUT: 725 mL / NET: -245 mL      PHYSICAL EXAM:  Constitutional: No acute distress  Neuro: Awake alert, oriented  HEENT: anicteric sclerae  CV: regular rate, regular rhythm  Pulm/chest: no accessory muscle use noted  Abd: soft, nondistended +bowel sounds.   Skin: warm, no jaundice   Psych: calm, cooperative      LABS:               12.3   13.55 )-----------( 192      ( 10-24 @ 05:53 )             37.9                12.2   18.97 )-----------( 221      ( 10-23 @ 06:30 )             36.3                14.6   9.28  )-----------( 282      ( 10-22 @ 16:37 )             44.9       10-24    140  |  103  |  14.5  ----------------------------<  132[H]  4.3   |  27.0  |  0.54    Ca    8.9      24 Oct 2024 05:53  Phos  2.8     10-24  Mg     2.1     10-24    TPro  6.3[L]  /  Alb  3.4  /  TBili  3.6[H]  /  DBili  3.4[H]  /  AST  159[H]  /  ALT  622[H]  /  AlkPhos  432[H]  10-24    LIVER FUNCTIONS - ( 24 Oct 2024 05:53 )  Alb: 3.4 g/dL / Pro: 6.3 g/dL / ALK PHOS: 432 U/L / ALT: 622 U/L / AST: 159 U/L / GGT: x             Lipase: 6 U/L (10-23-24 @ 06:30)  Lipase: 8 U/L (10-22-24 @ 16:37)    PT/INR - ( 22 Oct 2024 16:37 )   PT: 11.2 sec;   INR: 0.99 ratio         PTT - ( 22 Oct 2024 16:37 )  PTT:35.2 sec    < from: US Abdomen Upper Quadrant Right (10.22.24 @ 17:28) >  FINDINGS:  Liver: Increased echogenicity. Liver is enlarged and measures 20.7 cm in   length.  Bile ducts: Normal caliber. Common bile duct measures 7 mm.  Gallbladder: Cholelithiasis.  No focal tenderness or evidence of   cholecystitis.  Pancreas: Poorly visualized.  Right kidney: 11.4 cm. No hydronephrosis. Upper pole cyst measuring 2.9 x   2.4 cm.  Ascites: None.  IVC: Visualized portions are within normal limits.    IMPRESSION:  Cholelithiasiswithout sonographic evidence of cholecystitis.    Hepatic steatosis and hepatomegaly.    < end of copied text >      Operative Findings	inflamed GB   critical view achieved, GB dissected off liver bed  Procedure	PROCEDURES:  Robot-assisted cholecystectomy 23-Oct-2024 20:11:46  Jenni Hidalgo  Laparoscopic cholecystectomy with endoscopic retrograde cholangiopancreatography (ERCP) 23-Oct-2024 20:13:16 ROBOTIC ASSISTED ERCP Jenni Hidalgo  Post-Op Diagnosis	POST-OP DIAGNOSIS:  Cholelithiasis 23-Oct-2024 20:16:57  Jenni Hidalgo  Choledocholithiasis 23-Oct-2024 20:17:05  Jenni Hidalgo  Pre-Op Diagnosis	PRE-OP DIAGNOSIS:  Cholelithiasis 23-Oct-2024 20:13:37  Jenni Hidalgo  Choledocholithiasis 23-Oct-2024 20:14:34  Jenni Hidalgo  
GENERAL SURGERY PROGRESS NOTE    Patient: DORIS SNEED , 59y ([birth year])Female   MRN: [id number]  Location: [place] [address]  Visit: 10-22-24 Inpatient  Date: 10-25-24 @ 07:11      Subjective: Patient seen and examined at bedside. Patient has been tolerating her low fat diet with no nausea or vomiting, though she has reported low appetite. She has some abdominal pain that is controlled with pain medication regimen.     Objective:  Vitals: T(F): 98.3 (10-25-24 @ 04:25), Max: 100.3 (10-24-24 @ 18:51)  HR: 90 (10-25-24 @ 04:25)  BP: 135/83 (10-25-24 @ 04:25)  RR: 18 (10-25-24 @ 04:25)  SpO2: 95% (10-25-24 @ 04:25)      Diet, Regular:   Low Fat (LOWFAT)    PHYSICAL EXAM  General: NAD, resting comfortably  Neurology: A&Ox3, moves all extremities  Respiratory: nonlabored breathing, normal chest wall expansion  Abdominal: Soft, appropriately tender to palpation, ND, incisions c/d/i  Vascular: Warm and well perfused  Extremities: No edema    LAB/STUDIES:  CAPILLARY BLOOD GLUCOSE                            11.5   6.07  )-----------( 166      ( 25 Oct 2024 05:26 )             34.9     10-25    137  |  103  |  14.4  ----------------------------<  108[H]  3.9   |  24.0  |  0.46[L]    Ca    8.4      25 Oct 2024 05:26  Phos  1.4     10-25  Mg     1.9     10-25    TPro  5.8[L]  /  Alb  3.0[L]  /  TBili  3.1[H]  /  DBili  2.9[H]  /  AST  69[H]  /  ALT  385[H]  /  AlkPhos  388[H]  10-25               5.8  | 3.1  | 69       ------------------[388     ( 25 Oct 2024 05:26 )  3.0  | 2.9  | 385         Lipase:x      Amylase:x          Urinalysis Basic - ( 25 Oct 2024 05:26 )    Color: x / Appearance: x / SG: x / pH: x  Gluc: 108 mg/dL / Ketone: x  / Bili: x / Urobili: x   Blood: x / Protein: x / Nitrite: x   Leuk Esterase: x / RBC: x / WBC x   Sq Epi: x / Non Sq Epi: x / Bacteria: x    
Patient seen and examined at bedside. no events overnight. denies any cp/sob n/v at this time. pain well controlled     Vitals:Vital Signs Last 24 Hrs  T(C): 36.9 (23 Oct 2024 07:28), Max: 39.4 (22 Oct 2024 20:33)  T(F): 98.4 (23 Oct 2024 07:28), Max: 103 (22 Oct 2024 20:33)  HR: 74 (23 Oct 2024 07:28) (71 - 118)  BP: 99/63 (23 Oct 2024 07:28) (99/63 - 145/90)  BP(mean): 75 (22 Oct 2024 20:33) (75 - 75)  RR: 18 (23 Oct 2024 07:28) (16 - 19)  SpO2: 94% (23 Oct 2024 07:28) (93% - 100%)    Parameters below as of 23 Oct 2024 07:28  Patient On (Oxygen Delivery Method): room air        Labs:10-22    138  |  98  |  13.8  ----------------------------<  148[H]  4.5   |  27.0  |  0.44[L]    Ca    9.5      22 Oct 2024 16:37    TPro  6.7  /  Alb  4.0  /  TBili  3.4[H]  /  DBili  3.1[H]  /  AST  1430[H]  /  ALT  1414[H]  /  AlkPhos  628[H]  10-22                            12.2   18.97 )-----------( 221      ( 23 Oct 2024 06:30 )             36.3       Exam:  Gen: pt lying in bed, alert, in NAD  Resp: unlabored  CVS: RRR  Abd: soft, NT, ND  Ext: moving all extremities spontaneously, sensation intact, pulses 2+ 
Subjective: Patient seen and examined at bedside, complaint of abdominal pain mostly around umbilical incision, no overnight events, denies SOB/CP/N/V. Patient tolerating diet, ambulating indepenently, voiding freely      MEDICATIONS  (STANDING):  acetaminophen     Tablet .. 975 milliGRAM(s) Oral every 6 hours  amitriptyline 100 milliGRAM(s) Oral at bedtime  enoxaparin Injectable 40 milliGRAM(s) SubCutaneous every 24 hours  FLUoxetine 40 milliGRAM(s) Oral at bedtime  pantoprazole  Injectable 40 milliGRAM(s) IV Push two times a day  piperacillin/tazobactam IVPB.. 3.375 Gram(s) IV Intermittent every 8 hours    MEDICATIONS  (PRN):  SUMAtriptan 100 milliGRAM(s) Oral once PRN Migraine      Vital Signs Last 24 Hrs  T(C): 36.5 (24 Oct 2024 04:50), Max: 37.6 (23 Oct 2024 14:49)  T(F): 97.7 (24 Oct 2024 04:50), Max: 99.6 (23 Oct 2024 14:49)  HR: 84 (24 Oct 2024 04:50) (71 - 84)  BP: 120/75 (24 Oct 2024 04:50) (99/63 - 133/81)  BP(mean): 86 (23 Oct 2024 21:30) (86 - 96)  RR: 18 (24 Oct 2024 04:50) (12 - 18)  SpO2: 95% (24 Oct 2024 04:50) (92% - 98%)    Parameters below as of 24 Oct 2024 04:50  Patient On (Oxygen Delivery Method): room air        Physical Exam:    Constitutional: NAD  HEENT: PERRL, EOMI  Respiratory: Respirations non-labored, no accessory muscle use  Gastrointestinal: Soft, appropriately tender, non-distended, incisions c/d/i, umbilical incision with surrounding ecchymosis  Neurological: A&O x 3      LABS:                        12.3   13.55 )-----------( 192      ( 24 Oct 2024 05:53 )             37.9     10-24    140  |  103  |  14.5  ----------------------------<  132[H]  4.3   |  27.0  |  0.54    Ca    8.9      24 Oct 2024 05:53  Phos  2.8     10-24  Mg     2.1     10-24    TPro  6.3[L]  /  Alb  3.4  /  TBili  3.6[H]  /  DBili  3.4[H]  /  AST  159[H]  /  ALT  622[H]  /  AlkPhos  432[H]  10-24    PT/INR - ( 22 Oct 2024 16:37 )   PT: 11.2 sec;   INR: 0.99 ratio         PTT - ( 22 Oct 2024 16:37 )  PTT:35.2 sec  Urinalysis Basic - ( 24 Oct 2024 05:53 )    Color: x / Appearance: x / SG: x / pH: x  Gluc: 132 mg/dL / Ketone: x  / Bili: x / Urobili: x   Blood: x / Protein: x / Nitrite: x   Leuk Esterase: x / RBC: x / WBC x   Sq Epi: x / Non Sq Epi: x / Bacteria: x        A: 59F s/p RA april and ERCP, doing well but with complaint of pain    P:  Pain control  Anti-emetics PRN  OOB as tolerated  Encourage IS  DVT PPx  AM labs/ monitor LFTs

## 2024-10-25 NOTE — DISCHARGE NOTE NURSING/CASE MANAGEMENT/SOCIAL WORK - FINANCIAL ASSISTANCE
[facility] provides services at a reduced cost to those who are determined to be eligible through [facility]’s financial assistance program. Information regarding [facility]’s financial assistance program can be found by going to [email or web address] or by calling [phone].

## 2024-10-25 NOTE — DISCHARGE NOTE PROVIDER - NSDCFUSCHEDAPPT_GEN_ALL_CORE_FT
[facility]  ORTHOSURG [address]  Scheduled Appointment: 11/25/2024

## 2024-11-01 ENCOUNTER — OFFICE (OUTPATIENT)
Dept: URBAN - METROPOLITAN AREA CLINIC 94 | Facility: CLINIC | Age: 59
Setting detail: OPHTHALMOLOGY
End: 2024-11-01
Payer: COMMERCIAL

## 2024-11-01 DIAGNOSIS — Z96.1: ICD-10-CM

## 2024-11-01 PROBLEM — H25.11 CATARACT SENILE NUCLEAR SCLEROSIS; RIGHT EYE: Status: ACTIVE | Noted: 2024-10-05

## 2024-11-01 PROCEDURE — 99024 POSTOP FOLLOW-UP VISIT: CPT | Performed by: OPHTHALMOLOGY

## 2024-11-01 ASSESSMENT — KERATOMETRY
OD_AXISANGLE_DEGREES: 101
OS_AXISANGLE_DEGREES: 091
OS_K2POWER_DIOPTERS: 48.25
OS_K1POWER_DIOPTERS: 46.25
OD_K2POWER_DIOPTERS: 47.50
OD_K1POWER_DIOPTERS: 45.50

## 2024-11-01 ASSESSMENT — REFRACTION_MANIFEST
OS_CYLINDER: -0.75
OD_AXIS: 015
OD_SPHERE: +2.25
OS_AXIS: 170
OS_VA1: 20/30
OD_CYLINDER: -1.25
OS_SPHERE: +2.25
OD_VA1: 20/40

## 2024-11-01 ASSESSMENT — REFRACTION_CURRENTRX
OS_SPHERE: +2.25
OD_VPRISM_DIRECTION: PROGS
OD_AXIS: 009
OD_SPHERE: +2.50
OD_AXIS: 011
OD_SPHERE: +2.25
OD_ADD: +2.00
OS_OVR_VA: 20/
OD_CYLINDER: -1.25
OS_OVR_VA: 20/
OS_ADD: +2.50
OD_OVR_VA: 20/
OD_CYLINDER: -1.25
OS_CYLINDER: -0.25
OS_VPRISM_DIRECTION: PROGS
OD_ADD: +2.50
OS_AXIS: 007
OS_ADD: +2.00
OS_CYLINDER: SPH
OD_OVR_VA: 20/
OS_SPHERE: +2.00

## 2024-11-01 ASSESSMENT — CORNEAL EDEMA CLINICAL DESCRIPTION: OS_CORNEALEDEMA: 1+

## 2024-11-01 ASSESSMENT — REFRACTION_AUTOREFRACTION
OS_CYLINDER: -1.25
OD_AXIS: 010
OS_SPHERE: +1.25
OD_SPHERE: +0.75
OD_CYLINDER: -1.75
OS_AXIS: 011

## 2024-11-01 ASSESSMENT — VISUAL ACUITY
OD_BCVA: 20/60-
OS_BCVA: 20/40-

## 2024-11-01 ASSESSMENT — CONFRONTATIONAL VISUAL FIELD TEST (CVF)
OD_FINDINGS: FULL
OS_FINDINGS: FULL

## 2024-11-01 ASSESSMENT — TONOMETRY
OS_IOP_MMHG: 12
OD_IOP_MMHG: 16

## 2024-11-04 ENCOUNTER — APPOINTMENT (OUTPATIENT)
Dept: SURGERY | Facility: CLINIC | Age: 59
End: 2024-11-04
Payer: MEDICAID

## 2024-11-04 VITALS
DIASTOLIC BLOOD PRESSURE: 68 MMHG | TEMPERATURE: 98.8 F | SYSTOLIC BLOOD PRESSURE: 103 MMHG | WEIGHT: 216 LBS | HEART RATE: 82 BPM | BODY MASS INDEX: 34.72 KG/M2 | OXYGEN SATURATION: 95 % | RESPIRATION RATE: 16 BRPM | HEIGHT: 66 IN

## 2024-11-04 DIAGNOSIS — R10.9 UNSPECIFIED ABDOMINAL PAIN: ICD-10-CM

## 2024-11-04 PROCEDURE — 99024 POSTOP FOLLOW-UP VISIT: CPT

## 2024-11-06 ENCOUNTER — APPOINTMENT (OUTPATIENT)
Dept: SURGERY | Facility: CLINIC | Age: 59
End: 2024-11-06

## 2024-11-06 VITALS
TEMPERATURE: 98.6 F | WEIGHT: 210.25 LBS | BODY MASS INDEX: 33.79 KG/M2 | HEIGHT: 66 IN | OXYGEN SATURATION: 100 % | DIASTOLIC BLOOD PRESSURE: 75 MMHG | HEART RATE: 77 BPM | SYSTOLIC BLOOD PRESSURE: 111 MMHG | RESPIRATION RATE: 14 BRPM

## 2024-11-06 PROCEDURE — 99214 OFFICE O/P EST MOD 30 MIN: CPT

## 2024-11-08 ENCOUNTER — RESULT REVIEW (OUTPATIENT)
Age: 59
End: 2024-11-08

## 2024-11-08 ENCOUNTER — APPOINTMENT (OUTPATIENT)
Dept: SURGERY | Facility: CLINIC | Age: 59
End: 2024-11-08

## 2024-11-08 VITALS
SYSTOLIC BLOOD PRESSURE: 107 MMHG | OXYGEN SATURATION: 97 % | WEIGHT: 210 LBS | HEIGHT: 66 IN | DIASTOLIC BLOOD PRESSURE: 71 MMHG | HEART RATE: 72 BPM | RESPIRATION RATE: 16 BRPM | BODY MASS INDEX: 33.75 KG/M2 | TEMPERATURE: 98.5 F

## 2024-11-08 PROCEDURE — 99214 OFFICE O/P EST MOD 30 MIN: CPT

## 2024-11-11 ENCOUNTER — APPOINTMENT (OUTPATIENT)
Dept: CT IMAGING | Facility: CLINIC | Age: 59
End: 2024-11-11
Payer: MEDICAID

## 2024-11-11 ENCOUNTER — OUTPATIENT (OUTPATIENT)
Dept: OUTPATIENT SERVICES | Facility: HOSPITAL | Age: 59
LOS: 1 days | End: 2024-11-11
Payer: MEDICAID

## 2024-11-11 DIAGNOSIS — Z90.710 ACQUIRED ABSENCE OF BOTH CERVIX AND UTERUS: Chronic | ICD-10-CM

## 2024-11-11 LAB — SURGICAL PATHOLOGY STUDY: SIGNIFICANT CHANGE UP

## 2024-11-11 PROCEDURE — 74177 CT ABD & PELVIS W/CONTRAST: CPT | Mod: 26

## 2024-11-11 PROCEDURE — 74177 CT ABD & PELVIS W/CONTRAST: CPT

## 2024-11-25 ENCOUNTER — APPOINTMENT (OUTPATIENT)
Dept: ORTHOPEDIC SURGERY | Facility: CLINIC | Age: 59
End: 2024-11-25

## 2024-12-02 ENCOUNTER — APPOINTMENT (OUTPATIENT)
Dept: SURGERY | Facility: CLINIC | Age: 59
End: 2024-12-02
Payer: MEDICAID

## 2024-12-02 VITALS
TEMPERATURE: 98.6 F | WEIGHT: 220.8 LBS | OXYGEN SATURATION: 95 % | DIASTOLIC BLOOD PRESSURE: 72 MMHG | RESPIRATION RATE: 16 BRPM | SYSTOLIC BLOOD PRESSURE: 107 MMHG | HEIGHT: 66 IN | BODY MASS INDEX: 35.48 KG/M2 | HEART RATE: 70 BPM

## 2024-12-02 PROCEDURE — 99024 POSTOP FOLLOW-UP VISIT: CPT

## 2024-12-05 ENCOUNTER — OFFICE (OUTPATIENT)
Dept: URBAN - METROPOLITAN AREA CLINIC 94 | Facility: CLINIC | Age: 59
Setting detail: OPHTHALMOLOGY
End: 2024-12-05
Payer: COMMERCIAL

## 2024-12-05 DIAGNOSIS — Z96.1: ICD-10-CM

## 2024-12-05 DIAGNOSIS — H26.493: ICD-10-CM

## 2024-12-05 PROCEDURE — 99024 POSTOP FOLLOW-UP VISIT: CPT | Performed by: OPTOMETRIST

## 2024-12-05 ASSESSMENT — REFRACTION_CURRENTRX
OS_OVR_VA: 20/
OD_OVR_VA: 20/
OD_VPRISM_DIRECTION: PROGS
OD_CYLINDER: -1.25
OS_AXIS: 007
OD_AXIS: 009
OD_SPHERE: +2.25
OD_ADD: +2.50
OS_ADD: +2.00
OS_SPHERE: +2.00
OS_SPHERE: +2.25
OD_OVR_VA: 20/
OD_ADD: +2.00
OS_ADD: +2.50
OD_AXIS: 011
OS_CYLINDER: SPH
OD_SPHERE: +2.50
OS_CYLINDER: -0.25
OD_CYLINDER: -1.25
OS_VPRISM_DIRECTION: PROGS
OS_OVR_VA: 20/

## 2024-12-05 ASSESSMENT — REFRACTION_MANIFEST
OS_CYLINDER: -1.00
OD_AXIS: 005
OS_VA1: 20/30
OS_SPHERE: +1.00
OS_AXIS: 015
OD_ADD: +2.50
OD_CYLINDER: -2.00
OS_VA1: 20/30
OS_AXIS: 170
OD_SPHERE: +0.50
OD_CYLINDER: -1.25
OD_VA1: 20/40
OS_CYLINDER: -0.75
OS_SPHERE: +2.25
OD_VA1: 20/25
OD_SPHERE: +2.25
OS_ADD: +2.50
OD_AXIS: 015

## 2024-12-05 ASSESSMENT — TONOMETRY
OD_IOP_MMHG: 19
OS_IOP_MMHG: 18

## 2024-12-05 ASSESSMENT — CONFRONTATIONAL VISUAL FIELD TEST (CVF)
OD_FINDINGS: FULL
OS_FINDINGS: FULL

## 2024-12-05 ASSESSMENT — KERATOMETRY
OS_K2POWER_DIOPTERS: 47.75
OD_AXISANGLE_DEGREES: 096
OD_K2POWER_DIOPTERS: 48.75
OS_K1POWER_DIOPTERS: 46.25
OS_AXISANGLE_DEGREES: 094
OD_K1POWER_DIOPTERS: 45.75

## 2024-12-05 ASSESSMENT — REFRACTION_AUTOREFRACTION
OD_AXIS: 006
OS_CYLINDER: -1.25
OS_SPHERE: +1.25
OS_AXIS: 014
OD_SPHERE: +1.00
OD_CYLINDER: -2.50

## 2024-12-05 ASSESSMENT — VISUAL ACUITY
OD_BCVA: 20/80
OS_BCVA: 20/30-1

## 2024-12-05 ASSESSMENT — CORNEAL EDEMA CLINICAL DESCRIPTION: OS_CORNEALEDEMA: 1+

## 2024-12-13 ENCOUNTER — ASC (OUTPATIENT)
Dept: URBAN - METROPOLITAN AREA SURGERY 8 | Facility: SURGERY | Age: 59
Setting detail: OPHTHALMOLOGY
End: 2024-12-13
Payer: COMMERCIAL

## 2024-12-13 DIAGNOSIS — H26.491: ICD-10-CM

## 2024-12-13 PROCEDURE — 66821 AFTER CATARACT LASER SURGERY: CPT | Mod: 79,RT | Performed by: OPHTHALMOLOGY

## 2024-12-13 ASSESSMENT — REFRACTION_MANIFEST
OD_AXIS: 015
OS_ADD: +2.50
OD_ADD: +2.50
OS_AXIS: 170
OD_SPHERE: +0.50
OS_AXIS: 015
OS_SPHERE: +2.25
OS_SPHERE: +1.00
OS_CYLINDER: -0.75
OD_CYLINDER: -1.25
OD_SPHERE: +2.25
OS_VA1: 20/30
OS_CYLINDER: -1.00
OD_VA1: 20/40
OD_AXIS: 005
OD_CYLINDER: -2.00
OD_VA1: 20/25
OS_VA1: 20/30

## 2024-12-13 ASSESSMENT — REFRACTION_CURRENTRX
OD_ADD: +2.50
OD_ADD: +2.00
OD_OVR_VA: 20/
OD_VPRISM_DIRECTION: PROGS
OD_AXIS: 011
OD_CYLINDER: -1.25
OS_ADD: +2.50
OS_OVR_VA: 20/
OS_AXIS: 007
OD_AXIS: 009
OS_VPRISM_DIRECTION: PROGS
OS_SPHERE: +2.00
OS_CYLINDER: -0.25
OS_SPHERE: +2.25
OD_SPHERE: +2.50
OS_CYLINDER: SPH
OS_OVR_VA: 20/
OD_SPHERE: +2.25
OD_CYLINDER: -1.25
OD_OVR_VA: 20/
OS_ADD: +2.00

## 2024-12-13 ASSESSMENT — REFRACTION_AUTOREFRACTION
OS_AXIS: 014
OD_AXIS: 006
OD_CYLINDER: -2.50
OD_SPHERE: +1.00
OS_SPHERE: +1.25
OS_CYLINDER: -1.25

## 2024-12-13 ASSESSMENT — KERATOMETRY
OS_AXISANGLE_DEGREES: 094
OD_K2POWER_DIOPTERS: 48.75
OD_AXISANGLE_DEGREES: 096
OS_K2POWER_DIOPTERS: 47.75
OD_K1POWER_DIOPTERS: 45.75
OS_K1POWER_DIOPTERS: 46.25

## 2024-12-13 ASSESSMENT — CORNEAL EDEMA CLINICAL DESCRIPTION: OS_CORNEALEDEMA: 1+

## 2024-12-13 ASSESSMENT — VISUAL ACUITY
OD_BCVA: 20/80
OS_BCVA: 20/30-1

## 2024-12-16 ENCOUNTER — APPOINTMENT (OUTPATIENT)
Dept: UROLOGY | Facility: CLINIC | Age: 59
End: 2024-12-16
Payer: MEDICAID

## 2024-12-16 VITALS
SYSTOLIC BLOOD PRESSURE: 146 MMHG | DIASTOLIC BLOOD PRESSURE: 85 MMHG | HEIGHT: 66 IN | HEART RATE: 77 BPM | BODY MASS INDEX: 34.39 KG/M2 | WEIGHT: 214 LBS

## 2024-12-16 DIAGNOSIS — R39.9 UNSPECIFIED SYMPTOMS AND SIGNS INVOLVING THE GENITOURINARY SYSTEM: ICD-10-CM

## 2024-12-16 DIAGNOSIS — N28.1 CYST OF KIDNEY, ACQUIRED: ICD-10-CM

## 2024-12-16 PROCEDURE — 99204 OFFICE O/P NEW MOD 45 MIN: CPT

## 2024-12-18 LAB — BACTERIA UR CULT: NORMAL

## 2024-12-20 ENCOUNTER — ASC (OUTPATIENT)
Dept: URBAN - METROPOLITAN AREA SURGERY 8 | Facility: SURGERY | Age: 59
Setting detail: OPHTHALMOLOGY
End: 2024-12-20
Payer: COMMERCIAL

## 2024-12-20 ENCOUNTER — NON-APPOINTMENT (OUTPATIENT)
Age: 59
End: 2024-12-20

## 2024-12-20 DIAGNOSIS — H26.492: ICD-10-CM

## 2024-12-20 PROCEDURE — 66821 AFTER CATARACT LASER SURGERY: CPT | Mod: 79,LT | Performed by: OPHTHALMOLOGY

## 2024-12-20 ASSESSMENT — REFRACTION_CURRENTRX
OS_CYLINDER: SPH
OD_CYLINDER: -1.25
OS_ADD: +2.00
OD_OVR_VA: 20/
OD_AXIS: 009
OS_ADD: +2.50
OD_ADD: +2.50
OD_SPHERE: +2.50
OD_AXIS: 011
OS_AXIS: 007
OS_VPRISM_DIRECTION: PROGS
OD_ADD: +2.00
OD_SPHERE: +2.25
OS_SPHERE: +2.00
OS_SPHERE: +2.25
OD_VPRISM_DIRECTION: PROGS
OS_OVR_VA: 20/
OD_CYLINDER: -1.25
OD_OVR_VA: 20/
OS_CYLINDER: -0.25
OS_OVR_VA: 20/

## 2024-12-20 ASSESSMENT — KERATOMETRY
OS_K1POWER_DIOPTERS: 46.25
OS_K2POWER_DIOPTERS: 47.75
OD_K2POWER_DIOPTERS: 48.75
OD_K1POWER_DIOPTERS: 45.75
OD_AXISANGLE_DEGREES: 096
OS_AXISANGLE_DEGREES: 094

## 2024-12-20 ASSESSMENT — REFRACTION_AUTOREFRACTION
OS_SPHERE: +1.25
OS_AXIS: 014
OD_SPHERE: +1.00
OD_AXIS: 006
OD_CYLINDER: -2.50
OS_CYLINDER: -1.25

## 2024-12-20 ASSESSMENT — REFRACTION_MANIFEST
OD_SPHERE: +2.25
OD_CYLINDER: -2.00
OS_AXIS: 015
OS_AXIS: 170
OD_SPHERE: +0.50
OS_CYLINDER: -0.75
OD_CYLINDER: -1.25
OS_VA1: 20/30
OD_AXIS: 005
OS_SPHERE: +2.25
OS_ADD: +2.50
OD_VA1: 20/40
OS_VA1: 20/30
OD_ADD: +2.50
OD_AXIS: 015
OS_CYLINDER: -1.00
OS_SPHERE: +1.00
OD_VA1: 20/25

## 2024-12-20 ASSESSMENT — VISUAL ACUITY
OD_BCVA: 20/80
OS_BCVA: 20/30-1

## 2024-12-20 ASSESSMENT — CORNEAL EDEMA CLINICAL DESCRIPTION: OS_CORNEALEDEMA: 1+

## 2025-01-02 ENCOUNTER — OFFICE (OUTPATIENT)
Dept: URBAN - METROPOLITAN AREA CLINIC 94 | Facility: CLINIC | Age: 60
Setting detail: OPHTHALMOLOGY
End: 2025-01-02
Payer: COMMERCIAL

## 2025-01-02 DIAGNOSIS — H26.493: ICD-10-CM

## 2025-01-02 DIAGNOSIS — H26.491: ICD-10-CM

## 2025-01-02 DIAGNOSIS — H26.492: ICD-10-CM

## 2025-01-02 PROCEDURE — 99024 POSTOP FOLLOW-UP VISIT: CPT | Performed by: OPTOMETRIST

## 2025-01-02 ASSESSMENT — CONFRONTATIONAL VISUAL FIELD TEST (CVF)
OS_FINDINGS: FULL
OD_FINDINGS: FULL

## 2025-01-02 ASSESSMENT — TONOMETRY
OS_IOP_MMHG: 10
OD_IOP_MMHG: 16

## 2025-01-02 ASSESSMENT — CORNEAL EDEMA CLINICAL DESCRIPTION: OS_CORNEALEDEMA: 1+

## 2025-01-03 PROBLEM — H26.493 POSTERIOR CAPSULAR OPACIFICATION; RIGHT EYE, LEFT EYE, BOTH EYES: Status: ACTIVE | Noted: 2024-12-20

## 2025-01-03 PROBLEM — H26.491 POSTERIOR CAPSULAR OPACIFICATION; RIGHT EYE, LEFT EYE, BOTH EYES: Status: ACTIVE | Noted: 2024-12-20

## 2025-01-03 PROBLEM — H26.492 POSTERIOR CAPSULAR OPACIFICATION; RIGHT EYE, LEFT EYE, BOTH EYES: Status: ACTIVE | Noted: 2024-12-20

## 2025-01-03 ASSESSMENT — REFRACTION_CURRENTRX
OD_OVR_VA: 20/
OD_AXIS: 009
OD_CYLINDER: -1.25
OS_VPRISM_DIRECTION: PROGS
OS_SPHERE: +2.50
OS_AXIS: 007
OD_OVR_VA: 20/
OS_ADD: +2.00
OS_OVR_VA: 20/
OD_VPRISM_DIRECTION: PROGS
OD_ADD: +2.50
OD_SPHERE: +2.50
OS_OVR_VA: 20/
OS_SPHERE: +2.00
OS_CYLINDER: -0.25
OS_CYLINDER: SPH
OD_ADD: +2.00
OD_SPHERE: +2.25
OD_CYLINDER: -1.25
OD_AXIS: 011
OS_SPHERE: +2.25
OS_OVR_VA: 20/
OD_SPHERE: +2.50
OD_VPRISM_DIRECTION: SV
OS_ADD: +2.50
OD_OVR_VA: 20/
OS_VPRISM_DIRECTION: SV

## 2025-01-03 ASSESSMENT — REFRACTION_MANIFEST
OD_SPHERE: +2.25
OD_ADD: +2.50
OD_VA1: 20/25
OD_AXIS: 005
OS_CYLINDER: -1.00
OS_SPHERE: +1.00
OS_AXIS: 170
OD_AXIS: 015
OD_CYLINDER: -2.00
OD_VA1: 20/25
OU_VA: 20/25
OS_CYLINDER: -0.75
OS_CYLINDER: -1.25
OD_CYLINDER: -1.25
OD_SPHERE: +0.75
OS_SPHERE: +2.25
OS_AXIS: 015
OS_SPHERE: +1.00
OS_ADD: +2.50
OS_VA1: 20/30
OD_CYLINDER: -2.00
OS_VA1: 20/25
OD_ADD: +2.50
OS_ADD: +2.50
OS_VA1: 20/30
OD_VA1: 20/40
OS_AXIS: 180
OD_AXIS: 180
OD_SPHERE: +0.50

## 2025-01-03 ASSESSMENT — REFRACTION_AUTOREFRACTION
OD_SPHERE: +0.75
OS_SPHERE: +1.00
OS_AXIS: 180
OD_CYLINDER: -2.25
OD_AXIS: 005
OS_CYLINDER: -1.50

## 2025-01-03 ASSESSMENT — KERATOMETRY
OS_K2POWER_DIOPTERS: 48.25
OS_K1POWER_DIOPTERS: 46.00
OD_K2POWER_DIOPTERS: 48.25
OS_AXISANGLE_DEGREES: 090
OD_AXISANGLE_DEGREES: 090
OD_K1POWER_DIOPTERS: 45.75

## 2025-01-03 ASSESSMENT — VISUAL ACUITY
OD_BCVA: 20/70-1
OS_BCVA: 20/50

## 2025-01-06 ENCOUNTER — APPOINTMENT (OUTPATIENT)
Dept: ORTHOPEDIC SURGERY | Facility: CLINIC | Age: 60
End: 2025-01-06
Payer: MEDICAID

## 2025-01-06 VITALS
HEIGHT: 66 IN | SYSTOLIC BLOOD PRESSURE: 133 MMHG | BODY MASS INDEX: 34.55 KG/M2 | WEIGHT: 215 LBS | DIASTOLIC BLOOD PRESSURE: 81 MMHG

## 2025-01-06 DIAGNOSIS — M17.11 UNILATERAL PRIMARY OSTEOARTHRITIS, RIGHT KNEE: ICD-10-CM

## 2025-01-06 PROCEDURE — 99215 OFFICE O/P EST HI 40 MIN: CPT

## 2025-01-06 PROCEDURE — G2211 COMPLEX E/M VISIT ADD ON: CPT | Mod: NC

## 2025-01-26 PROBLEM — M25.562 LEFT KNEE PAIN, UNSPECIFIED CHRONICITY: Status: ACTIVE | Noted: 2025-01-26

## 2025-01-27 ENCOUNTER — APPOINTMENT (OUTPATIENT)
Dept: ORTHOPEDIC SURGERY | Facility: CLINIC | Age: 60
End: 2025-01-27
Payer: MEDICAID

## 2025-01-27 VITALS
WEIGHT: 215 LBS | BODY MASS INDEX: 34.55 KG/M2 | SYSTOLIC BLOOD PRESSURE: 113 MMHG | DIASTOLIC BLOOD PRESSURE: 75 MMHG | HEIGHT: 66 IN

## 2025-01-27 PROCEDURE — 99214 OFFICE O/P EST MOD 30 MIN: CPT | Mod: 25

## 2025-01-27 PROCEDURE — 73564 X-RAY EXAM KNEE 4 OR MORE: CPT | Mod: LT

## 2025-01-27 PROCEDURE — 20610 DRAIN/INJ JOINT/BURSA W/O US: CPT | Mod: LT

## 2025-01-28 ENCOUNTER — OUTPATIENT (OUTPATIENT)
Dept: OUTPATIENT SERVICES | Facility: HOSPITAL | Age: 60
LOS: 1 days | End: 2025-01-28
Payer: COMMERCIAL

## 2025-01-28 VITALS
WEIGHT: 224.87 LBS | HEART RATE: 80 BPM | SYSTOLIC BLOOD PRESSURE: 120 MMHG | TEMPERATURE: 99 F | HEIGHT: 66 IN | DIASTOLIC BLOOD PRESSURE: 75 MMHG | RESPIRATION RATE: 16 BRPM | OXYGEN SATURATION: 98 %

## 2025-01-28 DIAGNOSIS — Z98.84 BARIATRIC SURGERY STATUS: Chronic | ICD-10-CM

## 2025-01-28 DIAGNOSIS — Z13.89 ENCOUNTER FOR SCREENING FOR OTHER DISORDER: ICD-10-CM

## 2025-01-28 DIAGNOSIS — Z01.818 ENCOUNTER FOR OTHER PREPROCEDURAL EXAMINATION: ICD-10-CM

## 2025-01-28 DIAGNOSIS — Z29.9 ENCOUNTER FOR PROPHYLACTIC MEASURES, UNSPECIFIED: ICD-10-CM

## 2025-01-28 DIAGNOSIS — Z90.3 ACQUIRED ABSENCE OF STOMACH [PART OF]: Chronic | ICD-10-CM

## 2025-01-28 DIAGNOSIS — Z90.710 ACQUIRED ABSENCE OF BOTH CERVIX AND UTERUS: Chronic | ICD-10-CM

## 2025-01-28 DIAGNOSIS — M17.11 UNILATERAL PRIMARY OSTEOARTHRITIS, RIGHT KNEE: ICD-10-CM

## 2025-01-28 LAB
A1C WITH ESTIMATED AVERAGE GLUCOSE RESULT: 5.8 % — HIGH (ref 4–5.6)
ANION GAP SERPL CALC-SCNC: 14 MMOL/L — SIGNIFICANT CHANGE UP (ref 5–17)
APTT BLD: 35.4 SEC — SIGNIFICANT CHANGE UP (ref 24.5–35.6)
BASOPHILS # BLD AUTO: 0.04 K/UL — SIGNIFICANT CHANGE UP (ref 0–0.2)
BASOPHILS NFR BLD AUTO: 0.3 % — SIGNIFICANT CHANGE UP (ref 0–2)
BLD GP AB SCN SERPL QL: SIGNIFICANT CHANGE UP
BUN SERPL-MCNC: 28 MG/DL — HIGH (ref 8–20)
CALCIUM SERPL-MCNC: 9.4 MG/DL — SIGNIFICANT CHANGE UP (ref 8.4–10.5)
CHLORIDE SERPL-SCNC: 102 MMOL/L — SIGNIFICANT CHANGE UP (ref 96–108)
CO2 SERPL-SCNC: 23 MMOL/L — SIGNIFICANT CHANGE UP (ref 22–29)
CREAT SERPL-MCNC: 0.56 MG/DL — SIGNIFICANT CHANGE UP (ref 0.5–1.3)
EGFR: 104 ML/MIN/1.73M2 — SIGNIFICANT CHANGE UP
EOSINOPHIL # BLD AUTO: 0.04 K/UL — SIGNIFICANT CHANGE UP (ref 0–0.5)
EOSINOPHIL NFR BLD AUTO: 0.3 % — SIGNIFICANT CHANGE UP (ref 0–6)
ESTIMATED AVERAGE GLUCOSE: 120 MG/DL — HIGH (ref 68–114)
GLUCOSE SERPL-MCNC: 101 MG/DL — HIGH (ref 70–99)
HCT VFR BLD CALC: 37.6 % — SIGNIFICANT CHANGE UP (ref 34.5–45)
HGB BLD-MCNC: 12.2 G/DL — SIGNIFICANT CHANGE UP (ref 11.5–15.5)
IMM GRANULOCYTES NFR BLD AUTO: 0.2 % — SIGNIFICANT CHANGE UP (ref 0–0.9)
INR BLD: 0.99 RATIO — SIGNIFICANT CHANGE UP (ref 0.85–1.16)
LYMPHOCYTES # BLD AUTO: 1.26 K/UL — SIGNIFICANT CHANGE UP (ref 1–3.3)
LYMPHOCYTES # BLD AUTO: 10.4 % — LOW (ref 13–44)
MCHC RBC-ENTMCNC: 27.5 PG — SIGNIFICANT CHANGE UP (ref 27–34)
MCHC RBC-ENTMCNC: 32.4 G/DL — SIGNIFICANT CHANGE UP (ref 32–36)
MCV RBC AUTO: 84.7 FL — SIGNIFICANT CHANGE UP (ref 80–100)
MONOCYTES # BLD AUTO: 0.77 K/UL — SIGNIFICANT CHANGE UP (ref 0–0.9)
MONOCYTES NFR BLD AUTO: 6.3 % — SIGNIFICANT CHANGE UP (ref 2–14)
MRSA PCR RESULT.: SIGNIFICANT CHANGE UP
NEUTROPHILS # BLD AUTO: 10.03 K/UL — HIGH (ref 1.8–7.4)
NEUTROPHILS NFR BLD AUTO: 82.5 % — HIGH (ref 43–77)
PLATELET # BLD AUTO: 335 K/UL — SIGNIFICANT CHANGE UP (ref 150–400)
POTASSIUM SERPL-MCNC: 4.1 MMOL/L — SIGNIFICANT CHANGE UP (ref 3.5–5.3)
POTASSIUM SERPL-SCNC: 4.1 MMOL/L — SIGNIFICANT CHANGE UP (ref 3.5–5.3)
PROTHROM AB SERPL-ACNC: 11.2 SEC — SIGNIFICANT CHANGE UP (ref 9.9–13.4)
RBC # BLD: 4.44 M/UL — SIGNIFICANT CHANGE UP (ref 3.8–5.2)
RBC # FLD: 14.2 % — SIGNIFICANT CHANGE UP (ref 10.3–14.5)
S AUREUS DNA NOSE QL NAA+PROBE: SIGNIFICANT CHANGE UP
SODIUM SERPL-SCNC: 139 MMOL/L — SIGNIFICANT CHANGE UP (ref 135–145)
WBC # BLD: 12.17 K/UL — HIGH (ref 3.8–10.5)
WBC # FLD AUTO: 12.17 K/UL — HIGH (ref 3.8–10.5)

## 2025-01-28 PROCEDURE — 85610 PROTHROMBIN TIME: CPT

## 2025-01-28 PROCEDURE — 87641 MR-STAPH DNA AMP PROBE: CPT

## 2025-01-28 PROCEDURE — 87640 STAPH A DNA AMP PROBE: CPT

## 2025-01-28 PROCEDURE — G0463: CPT

## 2025-01-28 PROCEDURE — 86900 BLOOD TYPING SEROLOGIC ABO: CPT

## 2025-01-28 PROCEDURE — 80048 BASIC METABOLIC PNL TOTAL CA: CPT

## 2025-01-28 PROCEDURE — 85730 THROMBOPLASTIN TIME PARTIAL: CPT

## 2025-01-28 PROCEDURE — 36415 COLL VENOUS BLD VENIPUNCTURE: CPT

## 2025-01-28 PROCEDURE — 86901 BLOOD TYPING SEROLOGIC RH(D): CPT

## 2025-01-28 PROCEDURE — 86850 RBC ANTIBODY SCREEN: CPT

## 2025-01-28 PROCEDURE — 85025 COMPLETE CBC W/AUTO DIFF WBC: CPT

## 2025-01-28 PROCEDURE — 93010 ELECTROCARDIOGRAM REPORT: CPT

## 2025-01-28 PROCEDURE — 83036 HEMOGLOBIN GLYCOSYLATED A1C: CPT

## 2025-01-28 PROCEDURE — 93005 ELECTROCARDIOGRAM TRACING: CPT

## 2025-01-28 NOTE — H&P PST ADULT - PROBLEM SELECTOR PLAN 1
58 yo with PMH of BERTHA with CPAP, migraines, gastric ulcer, left ear deafness since a child,  pre DM, anxiety and depression now with right knee osteoarthritis scheduled for right total knee replacement on 2/6/2025.     -Medical evaluation pending  - Educated on ERP protocol   -Educated on NSAIDS, multivitamins and herbals that increase the risk of bleeding and need to be stopped 5 days before procedure  -Educated on infection prevention  -Tylenol can be taken if needed for pain  -Will continue all other medications as prescribed  -Verbalized understanding of all instructions.

## 2025-01-28 NOTE — H&P PST ADULT - NS PRO FEM REPRO HEALTH SCREEN
-- DO NOT REPLY / DO NOT REPLY ALL --  -- Message is from Engagement Center Operations ([facility]) --    General Patient Message Patient is calling doctor because he was returning a call to office regarding results     Caller Information       Type Contact Phone/Fax    11/19/2022 01:51 PM CST Phone (Incoming) Paulo Valdez (Self) [phone] (M)        Alternative phone number: None    Can a detailed message be left? No    Message Turnaround: [place]:    Refer to site's KB page for routing instructions    Please give this turnaround time to the caller:   \"You can expect to receive a response 1-3 business days after your provider's clinical team reviews the message\"              
Called patient yesterday 11/21/22 and left a detailed message on his voicemail with results, per epic permission.       Laisha Dozier RN   11/21/2022  5:57 PM CST Back to Top        Called patient back and gave results on his voicemail.        
mammogram

## 2025-01-28 NOTE — H&P PST ADULT - PROBLEM SELECTOR PROBLEM 2
Bleeding that does not stop/Swelling that gets worse/Pain not relieved by Medications/Fever greater than (need to indicate Fahrenheit or Celsius) Need for prophylactic measure

## 2025-01-28 NOTE — H&P PST ADULT - NEUROLOGICAL
details… sensation intact/responds to verbal commands/no spontaneous movement/superficial reflexes intact

## 2025-01-28 NOTE — H&P PST ADULT - NSICDXPASTSURGICALHX_GEN_ALL_CORE_FT
PAST SURGICAL HISTORY:  H/O gastric bypass     H/O gastric sleeve     History of cholecystectomy     History of hysterectomy

## 2025-01-28 NOTE — H&P PST ADULT - HISTORY OF PRESENT ILLNESS
58 yo with PMH of BERTHA with CPAP, migraines, gastric ulcer, left ear deafness since a child,  pre DM, anxiety and depression presents to PST today. Pt. reports pain to the right  knee for about 3 to 4 months and pain is becoming worse. Described pain as burning and sharp with no radiation to RLE. Reports completed cortisone injections previously with the last one 2 months ago with short term relief. Pt. reports pain is constant. Walking and movement from sitting to standing position have been exacerbating pain. Lying down, ice alleviate pain. Pain levels include a current pain level of 4/10, a minimum pain level of 4/10 and a maximum pain level of 10/10. Pt. ambulates with a cane. Takes diclofenac gel and tylenol PRN with some relief. Scheduled for right total knee replacement on 2/6/2025.

## 2025-01-28 NOTE — H&P PST ADULT - PROBLEM SELECTOR PLAN 2
Caprini -8 high risk   Surgical team please assess/recommend mechanical/pharmacological measures for VTE prophylaxis

## 2025-01-28 NOTE — H&P PST ADULT - ASSESSMENT
58 yo with PMH of BERTHA with CPAP, migraines, gastric ulcer, left ear deafness since a child,  pre DM, anxiety and depression now with right knee osteoarthritis scheduled for right total knee replacement on 2025.     -Medical evaluation pending  - Educated on ERP protocol   -Educated on NSAIDS, multivitamins and herbals that increase the risk of bleeding and need to be stopped 5 days before procedure  -Educated on infection prevention  -Tylenol can be taken if needed for pain  -Will continue all other medications as prescribed  -Verbalized understanding of all instructions.      OPIOID RISK TOOL    ENID EACH BOX THAT APPLIES AND ADD TOTALS AT THE END    FAMILY HISTORY OF SUBSTANCE ABUSE                 FEMALE         MALE                                                Alcohol                             [  ]1 pt          [  ]3pts                                               Illegal Durgs                     [  ]2 pts        [  ]3pts                                               Rx Drugs                           [  ]4 pts        [  ]4 pts    PERSONAL HISTORY OF SUBSTANCE ABUSE                                                                                          Alcohol                             [  ]3 pts       [  ]3 pts                                               Illegal Drugs                     [  ]4 pts        [  ]4 pts                                               Rx Drugs                           [  ]5 pts        [  ]5 pts    AGE BETWEEN 16-45 YEARS                                      [  ]1 pt         [  ]1 pt    HISTORY OF PREADOLESCENT   SEXUAL ABUSE                                                             [  ]3 pts        [  ]0pts    PSYCHOLOGICAL DISEASE                     ADD, OCD, Bipolar, Schizophrenia        [  ]2 pts         [  ]2 pts                      Depression                                               [  ]1 pt           [  ]1 pt           SCORING TOTAL   (add numbers and type here)              (*0**)                                     A score of 3 or lower indicated LOW risk for future opioid abuse  A score of 4 to 7 indicated moderate risk for future opioid abuse  A score of 8 or higher indicates a high risk for opioid abuse      CAPRINI SCORE    AGE RELATED RISK FACTORS                                                             [ x] Age 41-60 years                                            (1 Point)  [ ] Age: 61-74 years                                           (2 Points)                 [ ] Age= 75 years                                                (3 Points)             DISEASE RELATED RISK FACTORS                                                       [ ] Edema in the lower extremities                 (1 Point)                     [ ] Varicose veins                                               (1 Point)                                 [ x BMI > 25 Kg/m2                                            (1 Point)                                  [ ] Serious infection (ie PNA)                            (1 Point)                     [ ] Lung disease ( COPD, Emphysema)            (1 Point)                                                                          [ ] Acute myocardial infarction                         (1 Point)                  [ ] Congestive heart failure (in the previous month)  (1 Point)         [ ] Inflammatory bowel disease                            (1 Point)                  [ ] Central venous access, PICC or Port               (2 points)       (within the last month)                                                                [ ] Stroke (in the previous month)                        (5 Points)    [ ] Previous or present malignancy                       (2 points)                                                                                                                                                         HEMATOLOGY RELATED FACTORS                                                         [ ] Prior episodes of VTE                                     (3 Points)                     [ ] Positive family history for VTE                      (3 Points)                  [ ] Prothrombin 04503 A                                     (3 Points)                     [ ] Factor V Leiden                                                (3 Points)                        [ ] Lupus anticoagulants                                      (3 Points)                                                           [ ] Anticardiolipin antibodies                              (3 Points)                                                       [ ] High homocysteine in the blood                   (3 Points)                                             [ ] Other congenital or acquired thrombophilia      (3 Points)                                                [ ] Heparin induced thrombocytopenia                  (3 Points)                                        MOBILITY RELATED FACTORS  [ ] Bed rest                                                         (1 Point)  [ ] Plaster cast                                                    (2 points)  [ ] Bed bound for more than 72 hours           (2 Points)    GENDER SPECIFIC FACTORS  [ ] Pregnancy or had a baby within the last month   (1 Point)  [ ] Post-partum < 6 weeks                                   (1 Point)  [ ] Hormonal therapy  or oral contraception   (1 Point)  [ ] History of pregnancy complications              (1 point)  [ ] Unexplained or recurrent              (1 Point)    OTHER RISK FACTORS                                           (1 Point)  [ x] BMI >40, smoking, diabetes requiring insulin, chemotherapy  blood transfusions and length of surgery over 2 hours    SURGERY RELATED RISK FACTORS  [ ]  Section within the last month     (1 Point)  [ ] Minor surgery                                                  (1 Point)  [ ] Arthroscopic surgery                                       (2 Points)  [ ] Planned major surgery lasting more            (2 Points)      than 45 minutes     [ ]x Elective hip or knee joint replacement       (5 points)       surgery                                                TRAUMA RELATED RISK FACTORS  [ ] Fracture of the hip, pelvis, or leg                       (5 Points)  [ ] Spinal cord injury resulting in paralysis             (5 points)       (in the previous month)    [ ] Paralysis  (less than 1 month)                             (5 Points)  [ ] Multiple Trauma within 1 month                        (5 Points)    Total Score [    8  ]    Caprini Score 0-2: Low Risk, NO VTE prophylaxis required for most patients, encourage ambulation  Caprini Score 3-6: Moderate Risk , pharmacologic VTE prophylaxis is indicated for most patients (in the absence of contraindications)  Caprini Score Greater than or =7: High risk, pharmocologic VTE prophylaxis indicated for most patients (in the absence of contraindications)

## 2025-01-28 NOTE — H&P PST ADULT - PAIN SITE
Diffuse TTP to left hip region most significant in the left groin region no acute mariah deformity noted  Sensation grossly intact to bilateral lower ext +pedal pulses bilaterally right and left knees

## 2025-01-28 NOTE — H&P PST ADULT - NSICDXPASTMEDICALHX_GEN_ALL_CORE_FT
PAST MEDICAL HISTORY:  Anxiety and depression     Deafness in left ear     Gastric ulcer     Migraines

## 2025-01-30 ENCOUNTER — RX RENEWAL (OUTPATIENT)
Age: 60
End: 2025-01-30

## 2025-01-31 ENCOUNTER — APPOINTMENT (OUTPATIENT)
Dept: FAMILY MEDICINE | Facility: CLINIC | Age: 60
End: 2025-01-31
Payer: MEDICAID

## 2025-01-31 VITALS
BODY MASS INDEX: 34.39 KG/M2 | RESPIRATION RATE: 15 BRPM | HEIGHT: 66 IN | WEIGHT: 214 LBS | HEART RATE: 76 BPM | OXYGEN SATURATION: 97 % | SYSTOLIC BLOOD PRESSURE: 122 MMHG | TEMPERATURE: 98.2 F | DIASTOLIC BLOOD PRESSURE: 72 MMHG

## 2025-01-31 DIAGNOSIS — E66.9 OBESITY, UNSPECIFIED: ICD-10-CM

## 2025-01-31 DIAGNOSIS — M17.11 UNILATERAL PRIMARY OSTEOARTHRITIS, RIGHT KNEE: ICD-10-CM

## 2025-01-31 DIAGNOSIS — G47.33 OBSTRUCTIVE SLEEP APNEA (ADULT) (PEDIATRIC): ICD-10-CM

## 2025-01-31 DIAGNOSIS — Z01.818 ENCOUNTER FOR OTHER PREPROCEDURAL EXAMINATION: ICD-10-CM

## 2025-01-31 DIAGNOSIS — M17.0 BILATERAL PRIMARY OSTEOARTHRITIS OF KNEE: ICD-10-CM

## 2025-01-31 PROBLEM — H91.92 UNSPECIFIED HEARING LOSS, LEFT EAR: Chronic | Status: ACTIVE | Noted: 2025-01-28

## 2025-01-31 PROCEDURE — G2211 COMPLEX E/M VISIT ADD ON: CPT | Mod: NC

## 2025-01-31 PROCEDURE — 99214 OFFICE O/P EST MOD 30 MIN: CPT

## 2025-01-31 RX ORDER — IBUPROFEN 800 MG/1
800 TABLET, FILM COATED ORAL
Qty: 21 | Refills: 0 | Status: DISCONTINUED | COMMUNITY
Start: 2024-08-18

## 2025-01-31 RX ORDER — PREDNISOLONE ACETATE 10 MG/ML
1 SUSPENSION/ DROPS OPHTHALMIC
Qty: 5 | Refills: 0 | Status: DISCONTINUED | COMMUNITY
Start: 2024-12-13

## 2025-01-31 RX ORDER — PANTOPRAZOLE 40 MG/1
40 TABLET, DELAYED RELEASE ORAL
Qty: 60 | Refills: 0 | Status: DISCONTINUED | COMMUNITY
Start: 2024-08-23

## 2025-01-31 RX ORDER — TIRZEPATIDE 2.5 MG/.5ML
2.5 INJECTION, SOLUTION SUBCUTANEOUS
Qty: 1 | Refills: 0 | Status: ACTIVE | COMMUNITY
Start: 2025-01-31 | End: 1900-01-01

## 2025-01-31 RX ORDER — KETOROLAC TROMETHAMINE 5 MG/ML
0.5 SOLUTION OPHTHALMIC
Qty: 10 | Refills: 0 | Status: DISCONTINUED | COMMUNITY
Start: 2024-08-30

## 2025-01-31 RX ORDER — OMEPRAZOLE 40 MG/1
40 CAPSULE, DELAYED RELEASE ORAL
Qty: 30 | Refills: 0 | Status: DISCONTINUED | COMMUNITY
Start: 2024-12-10

## 2025-01-31 RX ORDER — HYDROCHLOROTHIAZIDE 12.5 MG/1
12.5 TABLET ORAL
Qty: 14 | Refills: 0 | Status: DISCONTINUED | COMMUNITY
Start: 2024-12-10

## 2025-01-31 RX ORDER — OFLOXACIN 3 MG/ML
0.3 SOLUTION/ DROPS OPHTHALMIC
Qty: 10 | Refills: 0 | Status: DISCONTINUED | COMMUNITY
Start: 2024-10-09

## 2025-02-03 ENCOUNTER — APPOINTMENT (OUTPATIENT)
Dept: CT IMAGING | Facility: CLINIC | Age: 60
End: 2025-02-03

## 2025-02-05 PROBLEM — K25.9 GASTRIC ULCER, UNSPECIFIED AS ACUTE OR CHRONIC, WITHOUT HEMORRHAGE OR PERFORATION: Chronic | Status: ACTIVE | Noted: 2025-01-28

## 2025-02-05 RX ORDER — SEMAGLUTIDE 0.68 MG/ML
2 INJECTION, SOLUTION SUBCUTANEOUS
Qty: 1 | Refills: 3 | Status: ACTIVE | COMMUNITY
Start: 2025-02-05 | End: 1900-01-01

## 2025-02-06 ENCOUNTER — NON-APPOINTMENT (OUTPATIENT)
Age: 60
End: 2025-02-06

## 2025-02-09 ENCOUNTER — OUTPATIENT (OUTPATIENT)
Dept: OUTPATIENT SERVICES | Facility: HOSPITAL | Age: 60
LOS: 1 days | End: 2025-02-09
Payer: MEDICAID

## 2025-02-09 DIAGNOSIS — Z98.84 BARIATRIC SURGERY STATUS: Chronic | ICD-10-CM

## 2025-02-09 DIAGNOSIS — Z00.8 ENCOUNTER FOR OTHER GENERAL EXAMINATION: ICD-10-CM

## 2025-02-09 DIAGNOSIS — Z90.710 ACQUIRED ABSENCE OF BOTH CERVIX AND UTERUS: Chronic | ICD-10-CM

## 2025-02-09 DIAGNOSIS — Z90.3 ACQUIRED ABSENCE OF STOMACH [PART OF]: Chronic | ICD-10-CM

## 2025-02-09 PROCEDURE — 73700 CT LOWER EXTREMITY W/O DYE: CPT

## 2025-02-10 ENCOUNTER — APPOINTMENT (OUTPATIENT)
Dept: FAMILY MEDICINE | Facility: CLINIC | Age: 60
End: 2025-02-10

## 2025-02-12 RX ORDER — POVIDONE-IODINE 7.5 %
1 SOLUTION, NON-ORAL TOPICAL ONCE
Refills: 0 | Status: DISCONTINUED | OUTPATIENT
Start: 2025-02-13 | End: 2025-02-13

## 2025-02-13 ENCOUNTER — TRANSCRIPTION ENCOUNTER (OUTPATIENT)
Age: 60
End: 2025-02-13

## 2025-02-13 ENCOUNTER — INPATIENT (INPATIENT)
Facility: HOSPITAL | Age: 60
LOS: 1 days | Discharge: HOME | DRG: 554 | End: 2025-02-15
Attending: STUDENT IN AN ORGANIZED HEALTH CARE EDUCATION/TRAINING PROGRAM | Admitting: STUDENT IN AN ORGANIZED HEALTH CARE EDUCATION/TRAINING PROGRAM
Payer: COMMERCIAL

## 2025-02-13 ENCOUNTER — APPOINTMENT (OUTPATIENT)
Dept: ORTHOPEDIC SURGERY | Facility: HOSPITAL | Age: 60
End: 2025-02-13

## 2025-02-13 VITALS
RESPIRATION RATE: 16 BRPM | TEMPERATURE: 98 F | SYSTOLIC BLOOD PRESSURE: 125 MMHG | WEIGHT: 224.87 LBS | HEART RATE: 74 BPM | DIASTOLIC BLOOD PRESSURE: 74 MMHG | HEIGHT: 66 IN | OXYGEN SATURATION: 99 %

## 2025-02-13 DIAGNOSIS — Z98.84 BARIATRIC SURGERY STATUS: Chronic | ICD-10-CM

## 2025-02-13 DIAGNOSIS — Z90.710 ACQUIRED ABSENCE OF BOTH CERVIX AND UTERUS: Chronic | ICD-10-CM

## 2025-02-13 DIAGNOSIS — Z90.3 ACQUIRED ABSENCE OF STOMACH [PART OF]: Chronic | ICD-10-CM

## 2025-02-13 DIAGNOSIS — M17.11 UNILATERAL PRIMARY OSTEOARTHRITIS, RIGHT KNEE: ICD-10-CM

## 2025-02-13 LAB — ABO RH CONFIRMATION: SIGNIFICANT CHANGE UP

## 2025-02-13 PROCEDURE — 73560 X-RAY EXAM OF KNEE 1 OR 2: CPT | Mod: 26,RT

## 2025-02-13 PROCEDURE — 99222 1ST HOSP IP/OBS MODERATE 55: CPT

## 2025-02-13 PROCEDURE — 27447 TOTAL KNEE ARTHROPLASTY: CPT | Mod: RT

## 2025-02-13 PROCEDURE — 20985 CPTR-ASST DIR MS PX: CPT

## 2025-02-13 PROCEDURE — 27447 TOTAL KNEE ARTHROPLASTY: CPT | Mod: AS,RT

## 2025-02-13 DEVICE — COMP FEM TRIATHLON CR SZ3 RT: Type: IMPLANTABLE DEVICE | Site: RIGHT | Status: FUNCTIONAL

## 2025-02-13 DEVICE — MAKO BONE PIN 4MM X 140MM: Type: IMPLANTABLE DEVICE | Site: RIGHT | Status: FUNCTIONAL

## 2025-02-13 DEVICE — BASEPLATE TIB TRIATHLON TRITAN SZ 4: Type: IMPLANTABLE DEVICE | Site: RIGHT | Status: FUNCTIONAL

## 2025-02-13 DEVICE — GUIDE PIN/SCREW ORTHO 3.2 X 37MM: Type: IMPLANTABLE DEVICE | Site: RIGHT | Status: FUNCTIONAL

## 2025-02-13 DEVICE — INSERT TIB BEARING CS X3 SZ 4 11MM: Type: IMPLANTABLE DEVICE | Site: RIGHT | Status: FUNCTIONAL

## 2025-02-13 DEVICE — PATELLA TRIATHLON ASSYM SZ A3X10MM: Type: IMPLANTABLE DEVICE | Site: RIGHT | Status: FUNCTIONAL

## 2025-02-13 DEVICE — MAKO BONE PIN 4MM X 80MM: Type: IMPLANTABLE DEVICE | Site: RIGHT | Status: FUNCTIONAL

## 2025-02-13 RX ORDER — HYDROMORPHONE/SOD CHLOR,ISO/PF 2 MG/10 ML
4 SYRINGE (ML) INJECTION
Refills: 0 | Status: DISCONTINUED | OUTPATIENT
Start: 2025-02-13 | End: 2025-02-14

## 2025-02-13 RX ORDER — ACETAMINOPHEN 500 MG/5ML
975 LIQUID (ML) ORAL EVERY 8 HOURS
Refills: 0 | Status: DISCONTINUED | OUTPATIENT
Start: 2025-02-13 | End: 2025-02-15

## 2025-02-13 RX ORDER — OXYCODONE HYDROCHLORIDE 30 MG/1
5 TABLET ORAL
Refills: 0 | Status: DISCONTINUED | OUTPATIENT
Start: 2025-02-13 | End: 2025-02-14

## 2025-02-13 RX ORDER — ACETAMINOPHEN 500 MG/5ML
1000 LIQUID (ML) ORAL ONCE
Refills: 0 | Status: COMPLETED | OUTPATIENT
Start: 2025-02-13 | End: 2025-02-14

## 2025-02-13 RX ORDER — ONDANSETRON HCL/PF 4 MG/2 ML
4 VIAL (ML) INJECTION EVERY 6 HOURS
Refills: 0 | Status: DISCONTINUED | OUTPATIENT
Start: 2025-02-13 | End: 2025-02-15

## 2025-02-13 RX ORDER — APREPITANT 40 MG/1
40 CAPSULE ORAL ONCE
Refills: 0 | Status: COMPLETED | OUTPATIENT
Start: 2025-02-13 | End: 2025-02-13

## 2025-02-13 RX ORDER — FLUOXETINE HYDROCHLORIDE 20 MG/1
40 CAPSULE ORAL AT BEDTIME
Refills: 0 | Status: DISCONTINUED | OUTPATIENT
Start: 2025-02-13 | End: 2025-02-15

## 2025-02-13 RX ORDER — OXYCODONE HYDROCHLORIDE 30 MG/1
10 TABLET ORAL
Refills: 0 | Status: DISCONTINUED | OUTPATIENT
Start: 2025-02-13 | End: 2025-02-14

## 2025-02-13 RX ORDER — CEFAZOLIN SODIUM IN 0.9 % NACL 3 G/100 ML
2000 INTRAVENOUS SOLUTION, PIGGYBACK (ML) INTRAVENOUS
Refills: 0 | Status: COMPLETED | OUTPATIENT
Start: 2025-02-13 | End: 2025-02-14

## 2025-02-13 RX ORDER — KETOROLAC TROMETHAMINE 30 MG/ML
15 INJECTION, SOLUTION INTRAMUSCULAR; INTRAVENOUS EVERY 6 HOURS
Refills: 0 | Status: DISCONTINUED | OUTPATIENT
Start: 2025-02-13 | End: 2025-02-14

## 2025-02-13 RX ORDER — ACETAMINOPHEN 500 MG/5ML
975 LIQUID (ML) ORAL ONCE
Refills: 0 | Status: COMPLETED | OUTPATIENT
Start: 2025-02-13 | End: 2025-02-13

## 2025-02-13 RX ORDER — AMITRIPTYLINE HYDROCHLORIDE 25 MG/1
100 TABLET, FILM COATED ORAL AT BEDTIME
Refills: 0 | Status: DISCONTINUED | OUTPATIENT
Start: 2025-02-13 | End: 2025-02-15

## 2025-02-13 RX ORDER — KETOROLAC TROMETHAMINE 30 MG/ML
15 INJECTION, SOLUTION INTRAMUSCULAR; INTRAVENOUS ONCE
Refills: 0 | Status: DISCONTINUED | OUTPATIENT
Start: 2025-02-13 | End: 2025-02-13

## 2025-02-13 RX ORDER — MAGNESIUM HYDROXIDE 400 MG/5ML
30 SUSPENSION ORAL DAILY
Refills: 0 | Status: DISCONTINUED | OUTPATIENT
Start: 2025-02-13 | End: 2025-02-15

## 2025-02-13 RX ORDER — SUMATRIPTAN 100 MG/1
100 TABLET, FILM COATED ORAL EVERY 24 HOURS
Refills: 0 | Status: DISCONTINUED | OUTPATIENT
Start: 2025-02-13 | End: 2025-02-15

## 2025-02-13 RX ORDER — SENNA 187 MG
2 TABLET ORAL AT BEDTIME
Refills: 0 | Status: DISCONTINUED | OUTPATIENT
Start: 2025-02-13 | End: 2025-02-15

## 2025-02-13 RX ORDER — TRAZODONE HCL 100 MG
100 TABLET ORAL AT BEDTIME
Refills: 0 | Status: DISCONTINUED | OUTPATIENT
Start: 2025-02-13 | End: 2025-02-15

## 2025-02-13 RX ORDER — APIXABAN 2.5 MG/1
2.5 TABLET, FILM COATED ORAL
Refills: 0 | Status: DISCONTINUED | OUTPATIENT
Start: 2025-02-14 | End: 2025-02-15

## 2025-02-13 RX ORDER — CEFAZOLIN SODIUM IN 0.9 % NACL 3 G/100 ML
2000 INTRAVENOUS SOLUTION, PIGGYBACK (ML) INTRAVENOUS ONCE
Refills: 0 | Status: DISCONTINUED | OUTPATIENT
Start: 2025-02-13 | End: 2025-02-13

## 2025-02-13 RX ORDER — BISACODYL 5 MG
10 TABLET, DELAYED RELEASE (ENTERIC COATED) ORAL ONCE
Refills: 0 | Status: DISCONTINUED | OUTPATIENT
Start: 2025-02-15 | End: 2025-02-15

## 2025-02-13 RX ORDER — TRANEXAMIC ACID 1000 MG/10
1000 AMPUL (ML) INTRAVENOUS ONCE
Refills: 0 | Status: DISCONTINUED | OUTPATIENT
Start: 2025-02-13 | End: 2025-02-13

## 2025-02-13 RX ORDER — SODIUM CHLORIDE 9 G/1000ML
1000 INJECTION, SOLUTION INTRAVENOUS
Refills: 0 | Status: DISCONTINUED | OUTPATIENT
Start: 2025-02-13 | End: 2025-02-13

## 2025-02-13 RX ORDER — POLYETHYLENE GLYCOL 3350 17 G/17G
17 POWDER, FOR SOLUTION ORAL AT BEDTIME
Refills: 0 | Status: DISCONTINUED | OUTPATIENT
Start: 2025-02-13 | End: 2025-02-15

## 2025-02-13 RX ADMIN — Medication 975 MILLIGRAM(S): at 23:00

## 2025-02-13 RX ADMIN — OXYCODONE HYDROCHLORIDE 5 MILLIGRAM(S): 30 TABLET ORAL at 23:00

## 2025-02-13 RX ADMIN — Medication 975 MILLIGRAM(S): at 08:14

## 2025-02-13 RX ADMIN — Medication 2000 MILLIGRAM(S): at 18:19

## 2025-02-13 RX ADMIN — KETOROLAC TROMETHAMINE 15 MILLIGRAM(S): 30 INJECTION, SOLUTION INTRAMUSCULAR; INTRAVENOUS at 17:15

## 2025-02-13 RX ADMIN — Medication 1000 MILLILITER(S): at 13:31

## 2025-02-13 RX ADMIN — APREPITANT 40 MILLIGRAM(S): 40 CAPSULE ORAL at 08:15

## 2025-02-13 RX ADMIN — OXYCODONE HYDROCHLORIDE 5 MILLIGRAM(S): 30 TABLET ORAL at 17:15

## 2025-02-13 RX ADMIN — OXYCODONE HYDROCHLORIDE 5 MILLIGRAM(S): 30 TABLET ORAL at 18:15

## 2025-02-13 RX ADMIN — Medication 2 TABLET(S): at 22:20

## 2025-02-13 RX ADMIN — AMITRIPTYLINE HYDROCHLORIDE 100 MILLIGRAM(S): 25 TABLET, FILM COATED ORAL at 22:19

## 2025-02-13 RX ADMIN — KETOROLAC TROMETHAMINE 15 MILLIGRAM(S): 30 INJECTION, SOLUTION INTRAMUSCULAR; INTRAVENOUS at 23:58

## 2025-02-13 RX ADMIN — OXYCODONE HYDROCHLORIDE 5 MILLIGRAM(S): 30 TABLET ORAL at 22:20

## 2025-02-13 RX ADMIN — Medication 975 MILLIGRAM(S): at 22:19

## 2025-02-13 RX ADMIN — Medication 100 MILLIGRAM(S): at 22:19

## 2025-02-13 RX ADMIN — Medication 100 MILLILITER(S): at 17:09

## 2025-02-14 ENCOUNTER — TRANSCRIPTION ENCOUNTER (OUTPATIENT)
Age: 60
End: 2025-02-14

## 2025-02-14 LAB
ANION GAP SERPL CALC-SCNC: 7 MMOL/L — SIGNIFICANT CHANGE UP (ref 5–17)
BUN SERPL-MCNC: 19.6 MG/DL — SIGNIFICANT CHANGE UP (ref 8–20)
CALCIUM SERPL-MCNC: 8.4 MG/DL — SIGNIFICANT CHANGE UP (ref 8.4–10.5)
CHLORIDE SERPL-SCNC: 106 MMOL/L — SIGNIFICANT CHANGE UP (ref 96–108)
CO2 SERPL-SCNC: 27 MMOL/L — SIGNIFICANT CHANGE UP (ref 22–29)
CREAT SERPL-MCNC: 0.53 MG/DL — SIGNIFICANT CHANGE UP (ref 0.5–1.3)
EGFR: 106 ML/MIN/1.73M2 — SIGNIFICANT CHANGE UP
EGFR: 106 ML/MIN/1.73M2 — SIGNIFICANT CHANGE UP
GLUCOSE SERPL-MCNC: 99 MG/DL — SIGNIFICANT CHANGE UP (ref 70–99)
HCT VFR BLD CALC: 33.6 % — LOW (ref 34.5–45)
HGB BLD-MCNC: 10.5 G/DL — LOW (ref 11.5–15.5)
MCHC RBC-ENTMCNC: 27.9 PG — SIGNIFICANT CHANGE UP (ref 27–34)
MCHC RBC-ENTMCNC: 31.3 G/DL — LOW (ref 32–36)
MCV RBC AUTO: 89.4 FL — SIGNIFICANT CHANGE UP (ref 80–100)
NRBC # BLD AUTO: 0 K/UL — SIGNIFICANT CHANGE UP (ref 0–0)
NRBC # FLD: 0 K/UL — SIGNIFICANT CHANGE UP (ref 0–0)
NRBC BLD AUTO-RTO: 0 /100 WBCS — SIGNIFICANT CHANGE UP (ref 0–0)
PLATELET # BLD AUTO: 216 K/UL — SIGNIFICANT CHANGE UP (ref 150–400)
PMV BLD: 10.1 FL — SIGNIFICANT CHANGE UP (ref 7–13)
POTASSIUM SERPL-MCNC: 4.3 MMOL/L — SIGNIFICANT CHANGE UP (ref 3.5–5.3)
POTASSIUM SERPL-SCNC: 4.3 MMOL/L — SIGNIFICANT CHANGE UP (ref 3.5–5.3)
RBC # BLD: 3.76 M/UL — LOW (ref 3.8–5.2)
RBC # FLD: 15.9 % — HIGH (ref 10.3–14.5)
SODIUM SERPL-SCNC: 140 MMOL/L — SIGNIFICANT CHANGE UP (ref 135–145)
WBC # BLD: 9.47 K/UL — SIGNIFICANT CHANGE UP (ref 3.8–10.5)
WBC # FLD AUTO: 9.47 K/UL — SIGNIFICANT CHANGE UP (ref 3.8–10.5)

## 2025-02-14 PROCEDURE — 99232 SBSQ HOSP IP/OBS MODERATE 35: CPT

## 2025-02-14 RX ORDER — TRAMADOL HYDROCHLORIDE 50 MG/1
50 TABLET, FILM COATED ORAL EVERY 6 HOURS
Refills: 0 | Status: DISCONTINUED | OUTPATIENT
Start: 2025-02-14 | End: 2025-02-15

## 2025-02-14 RX ORDER — TRAMADOL HYDROCHLORIDE 50 MG/1
100 TABLET, FILM COATED ORAL EVERY 6 HOURS
Refills: 0 | Status: DISCONTINUED | OUTPATIENT
Start: 2025-02-14 | End: 2025-02-15

## 2025-02-14 RX ADMIN — OXYCODONE HYDROCHLORIDE 5 MILLIGRAM(S): 30 TABLET ORAL at 06:49

## 2025-02-14 RX ADMIN — Medication 2000 MILLIGRAM(S): at 02:20

## 2025-02-14 RX ADMIN — OXYCODONE HYDROCHLORIDE 5 MILLIGRAM(S): 30 TABLET ORAL at 09:27

## 2025-02-14 RX ADMIN — KETOROLAC TROMETHAMINE 15 MILLIGRAM(S): 30 INJECTION, SOLUTION INTRAMUSCULAR; INTRAVENOUS at 06:01

## 2025-02-14 RX ADMIN — Medication 40 MILLIGRAM(S): at 06:01

## 2025-02-14 RX ADMIN — TRAMADOL HYDROCHLORIDE 100 MILLIGRAM(S): 50 TABLET, FILM COATED ORAL at 16:26

## 2025-02-14 RX ADMIN — Medication 1000 MILLIGRAM(S): at 06:48

## 2025-02-14 RX ADMIN — Medication 1000 MILLILITER(S): at 11:55

## 2025-02-14 RX ADMIN — KETOROLAC TROMETHAMINE 15 MILLIGRAM(S): 30 INJECTION, SOLUTION INTRAMUSCULAR; INTRAVENOUS at 12:55

## 2025-02-14 RX ADMIN — OXYCODONE HYDROCHLORIDE 5 MILLIGRAM(S): 30 TABLET ORAL at 06:07

## 2025-02-14 RX ADMIN — AMITRIPTYLINE HYDROCHLORIDE 100 MILLIGRAM(S): 25 TABLET, FILM COATED ORAL at 20:09

## 2025-02-14 RX ADMIN — Medication 2 TABLET(S): at 20:07

## 2025-02-14 RX ADMIN — Medication 975 MILLIGRAM(S): at 21:00

## 2025-02-14 RX ADMIN — Medication 1000 MILLILITER(S): at 14:29

## 2025-02-14 RX ADMIN — Medication 100 MILLILITER(S): at 02:22

## 2025-02-14 RX ADMIN — APIXABAN 2.5 MILLIGRAM(S): 2.5 TABLET, FILM COATED ORAL at 06:01

## 2025-02-14 RX ADMIN — TRAMADOL HYDROCHLORIDE 100 MILLIGRAM(S): 50 TABLET, FILM COATED ORAL at 22:20

## 2025-02-14 RX ADMIN — Medication 975 MILLIGRAM(S): at 20:08

## 2025-02-14 RX ADMIN — POLYETHYLENE GLYCOL 3350 17 GRAM(S): 17 POWDER, FOR SOLUTION ORAL at 20:07

## 2025-02-14 RX ADMIN — OXYCODONE HYDROCHLORIDE 5 MILLIGRAM(S): 30 TABLET ORAL at 10:27

## 2025-02-14 RX ADMIN — KETOROLAC TROMETHAMINE 15 MILLIGRAM(S): 30 INJECTION, SOLUTION INTRAMUSCULAR; INTRAVENOUS at 11:55

## 2025-02-14 RX ADMIN — APIXABAN 2.5 MILLIGRAM(S): 2.5 TABLET, FILM COATED ORAL at 17:50

## 2025-02-14 RX ADMIN — Medication 125 MILLILITER(S): at 20:06

## 2025-02-14 RX ADMIN — Medication 1000 MILLILITER(S): at 09:31

## 2025-02-14 RX ADMIN — KETOROLAC TROMETHAMINE 15 MILLIGRAM(S): 30 INJECTION, SOLUTION INTRAMUSCULAR; INTRAVENOUS at 06:48

## 2025-02-14 RX ADMIN — TRAMADOL HYDROCHLORIDE 100 MILLIGRAM(S): 50 TABLET, FILM COATED ORAL at 15:26

## 2025-02-14 RX ADMIN — Medication 400 MILLIGRAM(S): at 06:00

## 2025-02-14 RX ADMIN — FLUOXETINE HYDROCHLORIDE 40 MILLIGRAM(S): 20 CAPSULE ORAL at 09:27

## 2025-02-14 RX ADMIN — TRAMADOL HYDROCHLORIDE 100 MILLIGRAM(S): 50 TABLET, FILM COATED ORAL at 21:33

## 2025-02-15 VITALS
TEMPERATURE: 98 F | HEART RATE: 96 BPM | SYSTOLIC BLOOD PRESSURE: 115 MMHG | DIASTOLIC BLOOD PRESSURE: 81 MMHG | RESPIRATION RATE: 18 BRPM | OXYGEN SATURATION: 95 %

## 2025-02-15 PROCEDURE — C1713: CPT

## 2025-02-15 PROCEDURE — 99232 SBSQ HOSP IP/OBS MODERATE 35: CPT

## 2025-02-15 PROCEDURE — C1776: CPT

## 2025-02-15 PROCEDURE — 80048 BASIC METABOLIC PNL TOTAL CA: CPT

## 2025-02-15 PROCEDURE — S2900: CPT

## 2025-02-15 PROCEDURE — 36415 COLL VENOUS BLD VENIPUNCTURE: CPT

## 2025-02-15 PROCEDURE — 73560 X-RAY EXAM OF KNEE 1 OR 2: CPT

## 2025-02-15 PROCEDURE — 85027 COMPLETE CBC AUTOMATED: CPT

## 2025-02-15 RX ORDER — TRAMADOL HYDROCHLORIDE 50 MG/1
1 TABLET, FILM COATED ORAL
Qty: 28 | Refills: 0
Start: 2025-02-15 | End: 2025-02-21

## 2025-02-15 RX ORDER — SENNOSIDES, DOCUSATE SODIUM 8.6; 5 MG/1; MG/1
2 TABLET ORAL
Qty: 14 | Refills: 0
Start: 2025-02-15 | End: 2025-02-21

## 2025-02-15 RX ORDER — ACETAMINOPHEN 500 MG/5ML
3 LIQUID (ML) ORAL
Qty: 63 | Refills: 0
Start: 2025-02-15 | End: 2025-02-21

## 2025-02-15 RX ORDER — CELECOXIB 50 MG/1
1 CAPSULE ORAL
Qty: 42 | Refills: 0
Start: 2025-02-15 | End: 2025-03-07

## 2025-02-15 RX ORDER — APIXABAN 2.5 MG/1
1 TABLET, FILM COATED ORAL
Qty: 28 | Refills: 0
Start: 2025-02-15 | End: 2025-02-28

## 2025-02-15 RX ORDER — NALOXONE HYDROCHLORIDE 0.4 MG/ML
1 INJECTION, SOLUTION INTRAMUSCULAR; INTRAVENOUS; SUBCUTANEOUS
Qty: 1 | Refills: 0
Start: 2025-02-15

## 2025-02-15 RX ADMIN — TRAMADOL HYDROCHLORIDE 100 MILLIGRAM(S): 50 TABLET, FILM COATED ORAL at 04:40

## 2025-02-15 RX ADMIN — APIXABAN 2.5 MILLIGRAM(S): 2.5 TABLET, FILM COATED ORAL at 05:32

## 2025-02-15 RX ADMIN — TRAMADOL HYDROCHLORIDE 100 MILLIGRAM(S): 50 TABLET, FILM COATED ORAL at 03:57

## 2025-02-15 RX ADMIN — Medication 975 MILLIGRAM(S): at 05:32

## 2025-02-15 RX ADMIN — TRAMADOL HYDROCHLORIDE 100 MILLIGRAM(S): 50 TABLET, FILM COATED ORAL at 10:05

## 2025-02-15 RX ADMIN — Medication 40 MILLIGRAM(S): at 05:32

## 2025-02-15 RX ADMIN — Medication 975 MILLIGRAM(S): at 06:15

## 2025-02-17 ENCOUNTER — NON-APPOINTMENT (OUTPATIENT)
Age: 60
End: 2025-02-17

## 2025-02-24 ENCOUNTER — APPOINTMENT (OUTPATIENT)
Dept: FAMILY MEDICINE | Facility: CLINIC | Age: 60
End: 2025-02-24
Payer: MEDICAID

## 2025-02-24 VITALS
TEMPERATURE: 98.3 F | OXYGEN SATURATION: 93 % | RESPIRATION RATE: 14 BRPM | HEIGHT: 66 IN | DIASTOLIC BLOOD PRESSURE: 78 MMHG | SYSTOLIC BLOOD PRESSURE: 118 MMHG | HEART RATE: 76 BPM

## 2025-02-24 DIAGNOSIS — F41.8 OTHER SPECIFIED ANXIETY DISORDERS: ICD-10-CM

## 2025-02-24 DIAGNOSIS — E66.9 OBESITY, UNSPECIFIED: ICD-10-CM

## 2025-02-24 DIAGNOSIS — G47.33 OBSTRUCTIVE SLEEP APNEA (ADULT) (PEDIATRIC): ICD-10-CM

## 2025-02-24 PROBLEM — Z96.651 STATUS POST TOTAL RIGHT KNEE REPLACEMENT: Status: ACTIVE | Noted: 2025-02-24

## 2025-02-24 PROCEDURE — G2211 COMPLEX E/M VISIT ADD ON: CPT | Mod: NC

## 2025-02-24 PROCEDURE — 99213 OFFICE O/P EST LOW 20 MIN: CPT

## 2025-02-24 RX ORDER — TRAMADOL HYDROCHLORIDE 50 MG/1
50 TABLET, COATED ORAL
Qty: 21 | Refills: 0 | Status: ACTIVE | COMMUNITY
Start: 2025-02-24 | End: 1900-01-01

## 2025-02-24 RX ORDER — CELECOXIB 200 MG/1
200 CAPSULE ORAL
Refills: 0 | Status: ACTIVE | COMMUNITY
Start: 2025-02-24

## 2025-02-24 RX ORDER — APIXABAN 5 MG/1
5 TABLET, FILM COATED ORAL
Qty: 60 | Refills: 3 | Status: ACTIVE | COMMUNITY
Start: 2025-02-24

## 2025-03-06 ENCOUNTER — APPOINTMENT (OUTPATIENT)
Dept: ORTHOPEDIC SURGERY | Facility: CLINIC | Age: 60
End: 2025-03-06
Payer: MEDICAID

## 2025-03-06 DIAGNOSIS — Z96.651 PRESENCE OF RIGHT ARTIFICIAL KNEE JOINT: ICD-10-CM

## 2025-03-06 PROCEDURE — 99024 POSTOP FOLLOW-UP VISIT: CPT

## 2025-03-06 PROCEDURE — 73562 X-RAY EXAM OF KNEE 3: CPT | Mod: 26,RT

## 2025-03-06 RX ORDER — APIXABAN 2.5 MG/1
2.5 TABLET, FILM COATED ORAL
Qty: 42 | Refills: 0 | Status: ACTIVE | COMMUNITY
Start: 2025-03-06 | End: 1900-01-01

## 2025-03-06 RX ORDER — TRAMADOL HYDROCHLORIDE 50 MG/1
50 TABLET, COATED ORAL
Qty: 21 | Refills: 0 | Status: ACTIVE | COMMUNITY
Start: 2025-03-06 | End: 1900-01-01

## 2025-03-25 ENCOUNTER — APPOINTMENT (OUTPATIENT)
Dept: ORTHOPEDIC SURGERY | Facility: CLINIC | Age: 60
End: 2025-03-25
Payer: MEDICAID

## 2025-03-25 DIAGNOSIS — M17.0 BILATERAL PRIMARY OSTEOARTHRITIS OF KNEE: ICD-10-CM

## 2025-03-25 DIAGNOSIS — Z96.651 PRESENCE OF RIGHT ARTIFICIAL KNEE JOINT: ICD-10-CM

## 2025-03-25 PROCEDURE — 73562 X-RAY EXAM OF KNEE 3: CPT | Mod: 26,RT,59

## 2025-03-25 PROCEDURE — 99214 OFFICE O/P EST MOD 30 MIN: CPT | Mod: 24,25

## 2025-03-31 ENCOUNTER — APPOINTMENT (OUTPATIENT)
Dept: FAMILY MEDICINE | Facility: CLINIC | Age: 60
End: 2025-03-31
Payer: MEDICAID

## 2025-03-31 VITALS
HEART RATE: 80 BPM | SYSTOLIC BLOOD PRESSURE: 126 MMHG | RESPIRATION RATE: 12 BRPM | HEIGHT: 66 IN | DIASTOLIC BLOOD PRESSURE: 80 MMHG | TEMPERATURE: 98.3 F | OXYGEN SATURATION: 9 % | WEIGHT: 215 LBS | BODY MASS INDEX: 34.55 KG/M2

## 2025-03-31 DIAGNOSIS — R73.03 PREDIABETES.: ICD-10-CM

## 2025-03-31 DIAGNOSIS — G47.33 OBSTRUCTIVE SLEEP APNEA (ADULT) (PEDIATRIC): ICD-10-CM

## 2025-03-31 DIAGNOSIS — E66.9 OBESITY, UNSPECIFIED: ICD-10-CM

## 2025-03-31 PROCEDURE — 99213 OFFICE O/P EST LOW 20 MIN: CPT | Mod: 25

## 2025-03-31 PROCEDURE — G0447 BEHAVIOR COUNSEL OBESITY 15M: CPT | Mod: 59

## 2025-04-02 ENCOUNTER — OFFICE (OUTPATIENT)
Dept: URBAN - METROPOLITAN AREA CLINIC 94 | Facility: CLINIC | Age: 60
Setting detail: OPHTHALMOLOGY
End: 2025-04-02
Payer: COMMERCIAL

## 2025-04-02 DIAGNOSIS — H04.123: ICD-10-CM

## 2025-04-02 DIAGNOSIS — H43.393: ICD-10-CM

## 2025-04-02 DIAGNOSIS — Z96.1: ICD-10-CM

## 2025-04-02 PROCEDURE — 92250 FUNDUS PHOTOGRAPHY W/I&R: CPT | Performed by: OPHTHALMOLOGY

## 2025-04-02 PROCEDURE — 92012 INTRM OPH EXAM EST PATIENT: CPT | Performed by: OPHTHALMOLOGY

## 2025-04-02 ASSESSMENT — REFRACTION_MANIFEST
OD_VA1: 20/25
OD_AXIS: 180
OS_ADD: +2.50
OD_CYLINDER: -1.25
OD_ADD: +2.50
OD_SPHERE: +2.25
OS_CYLINDER: -1.00
OD_CYLINDER: -2.00
OS_VA1: 20/30
OS_ADD: +2.50
OD_ADD: +2.50
OD_AXIS: 005
OD_VA1: 20/40
OD_SPHERE: +0.50
OS_CYLINDER: -0.75
OS_VA1: 20/30
OS_VA1: 20/25
OS_SPHERE: +2.25
OU_VA: 20/25
OD_CYLINDER: -2.00
OS_AXIS: 015
OD_AXIS: 015
OD_VA1: 20/25
OS_AXIS: 180
OS_CYLINDER: -1.25
OS_SPHERE: +1.00
OS_AXIS: 170
OS_SPHERE: +1.00
OD_SPHERE: +0.75

## 2025-04-02 ASSESSMENT — REFRACTION_CURRENTRX
OS_SPHERE: +1.00
OD_ADD: +2.75
OS_VPRISM_DIRECTION: PROGS
OS_SPHERE: +2.00
OD_AXIS: 009
OS_OVR_VA: 20/
OS_CYLINDER: -1.25
OD_SPHERE: +2.50
OS_OVR_VA: 20/
OD_CYLINDER: -1.25
OS_SPHERE: +2.25
OD_VPRISM_DIRECTION: PROGS
OD_VPRISM_DIRECTION: SV
OS_ADD: +2.50
OD_AXIS: 180
OS_AXIS: 007
OD_ADD: +2.50
OD_CYLINDER: -1.25
OD_OVR_VA: 20/
OS_ADD: +2.00
OD_CYLINDER: -2.00
OD_SPHERE: +0.75
OS_OVR_VA: 20/
OS_AXIS: 180
OS_SPHERE: +2.50
OD_SPHERE: +2.50
OD_SPHERE: +2.25
OD_OVR_VA: 20/
OD_OVR_VA: 20/
OS_CYLINDER: -0.25
OD_VPRISM_DIRECTION: PROGS
OS_VPRISM_DIRECTION: SV
OS_CYLINDER: SPH
OD_ADD: +2.00
OS_VPRISM_DIRECTION: PROGS
OD_AXIS: 011
OS_ADD: +2.75

## 2025-04-02 ASSESSMENT — CORNEAL EDEMA CLINICAL DESCRIPTION: OS_CORNEALEDEMA: 1+

## 2025-04-02 ASSESSMENT — KERATOMETRY
OS_K1POWER_DIOPTERS: 46.00
OD_K2POWER_DIOPTERS: 48.25
OD_K1POWER_DIOPTERS: 45.75
OS_AXISANGLE_DEGREES: 090
OD_AXISANGLE_DEGREES: 090
OS_K2POWER_DIOPTERS: 48.25

## 2025-04-02 ASSESSMENT — TONOMETRY
OS_IOP_MMHG: 12
OD_IOP_MMHG: 17

## 2025-04-02 ASSESSMENT — REFRACTION_AUTOREFRACTION
OD_SPHERE: +0.75
OS_SPHERE: +1.00
OD_CYLINDER: -2.25
OD_AXIS: 005
OS_AXIS: 180
OS_CYLINDER: -1.50

## 2025-04-02 ASSESSMENT — CONFRONTATIONAL VISUAL FIELD TEST (CVF)
OD_FINDINGS: FULL
OS_FINDINGS: FULL

## 2025-04-02 ASSESSMENT — VISUAL ACUITY
OD_BCVA: 20/40
OS_BCVA: 20/30

## 2025-04-30 ENCOUNTER — RX RENEWAL (OUTPATIENT)
Age: 60
End: 2025-04-30

## 2025-04-30 ENCOUNTER — APPOINTMENT (OUTPATIENT)
Dept: FAMILY MEDICINE | Facility: CLINIC | Age: 60
End: 2025-04-30
Payer: MEDICAID

## 2025-04-30 VITALS
BODY MASS INDEX: 34.87 KG/M2 | HEART RATE: 98 BPM | HEIGHT: 66 IN | RESPIRATION RATE: 14 BRPM | DIASTOLIC BLOOD PRESSURE: 76 MMHG | OXYGEN SATURATION: 96 % | SYSTOLIC BLOOD PRESSURE: 124 MMHG | TEMPERATURE: 97.3 F | WEIGHT: 217 LBS

## 2025-04-30 DIAGNOSIS — E66.9 OBESITY, UNSPECIFIED: ICD-10-CM

## 2025-04-30 PROCEDURE — 99213 OFFICE O/P EST LOW 20 MIN: CPT

## 2025-04-30 PROCEDURE — G2211 COMPLEX E/M VISIT ADD ON: CPT | Mod: NC

## 2025-05-01 ENCOUNTER — APPOINTMENT (OUTPATIENT)
Dept: CT IMAGING | Facility: CLINIC | Age: 60
End: 2025-05-01
Payer: MEDICAID

## 2025-05-01 ENCOUNTER — OUTPATIENT (OUTPATIENT)
Dept: OUTPATIENT SERVICES | Facility: HOSPITAL | Age: 60
LOS: 1 days | End: 2025-05-01

## 2025-05-01 DIAGNOSIS — Z00.8 ENCOUNTER FOR OTHER GENERAL EXAMINATION: ICD-10-CM

## 2025-05-01 DIAGNOSIS — Z98.84 BARIATRIC SURGERY STATUS: Chronic | ICD-10-CM

## 2025-05-01 PROCEDURE — 73700 CT LOWER EXTREMITY W/O DYE: CPT | Mod: 26,LT

## 2025-05-06 ENCOUNTER — APPOINTMENT (OUTPATIENT)
Dept: ORTHOPEDIC SURGERY | Facility: CLINIC | Age: 60
End: 2025-05-06
Payer: MEDICAID

## 2025-05-06 DIAGNOSIS — Z96.651 PRESENCE OF RIGHT ARTIFICIAL KNEE JOINT: ICD-10-CM

## 2025-05-06 PROCEDURE — 99024 POSTOP FOLLOW-UP VISIT: CPT

## 2025-05-06 PROCEDURE — 73562 X-RAY EXAM OF KNEE 3: CPT | Mod: RT

## 2025-05-15 ENCOUNTER — OUTPATIENT (OUTPATIENT)
Dept: OUTPATIENT SERVICES | Facility: HOSPITAL | Age: 60
LOS: 1 days | End: 2025-05-15
Payer: COMMERCIAL

## 2025-05-15 VITALS
WEIGHT: 209.44 LBS | HEIGHT: 65 IN | SYSTOLIC BLOOD PRESSURE: 120 MMHG | RESPIRATION RATE: 18 BRPM | HEART RATE: 79 BPM | TEMPERATURE: 97 F | DIASTOLIC BLOOD PRESSURE: 66 MMHG | OXYGEN SATURATION: 97 %

## 2025-05-15 DIAGNOSIS — Z01.818 ENCOUNTER FOR OTHER PREPROCEDURAL EXAMINATION: ICD-10-CM

## 2025-05-15 DIAGNOSIS — Z98.890 OTHER SPECIFIED POSTPROCEDURAL STATES: Chronic | ICD-10-CM

## 2025-05-15 DIAGNOSIS — Z90.3 ACQUIRED ABSENCE OF STOMACH [PART OF]: Chronic | ICD-10-CM

## 2025-05-15 DIAGNOSIS — M17.12 UNILATERAL PRIMARY OSTEOARTHRITIS, LEFT KNEE: ICD-10-CM

## 2025-05-15 DIAGNOSIS — Z29.9 ENCOUNTER FOR PROPHYLACTIC MEASURES, UNSPECIFIED: ICD-10-CM

## 2025-05-15 DIAGNOSIS — Z98.84 BARIATRIC SURGERY STATUS: Chronic | ICD-10-CM

## 2025-05-15 DIAGNOSIS — Z90.710 ACQUIRED ABSENCE OF BOTH CERVIX AND UTERUS: Chronic | ICD-10-CM

## 2025-05-15 DIAGNOSIS — G47.33 OBSTRUCTIVE SLEEP APNEA (ADULT) (PEDIATRIC): ICD-10-CM

## 2025-05-15 LAB
A1C WITH ESTIMATED AVERAGE GLUCOSE RESULT: 5.5 % — SIGNIFICANT CHANGE UP (ref 4–5.6)
ALBUMIN SERPL ELPH-MCNC: 3.9 G/DL — SIGNIFICANT CHANGE UP (ref 3.3–5.2)
ALP SERPL-CCNC: 193 U/L — HIGH (ref 40–120)
ALT FLD-CCNC: 35 U/L — HIGH
ANION GAP SERPL CALC-SCNC: 13 MMOL/L — SIGNIFICANT CHANGE UP (ref 5–17)
APTT BLD: 35.6 SEC — SIGNIFICANT CHANGE UP (ref 26.1–36.8)
AST SERPL-CCNC: 20 U/L — SIGNIFICANT CHANGE UP
BASOPHILS # BLD AUTO: 0.04 K/UL — SIGNIFICANT CHANGE UP (ref 0–0.2)
BASOPHILS NFR BLD AUTO: 0.6 % — SIGNIFICANT CHANGE UP (ref 0–2)
BILIRUB SERPL-MCNC: 0.4 MG/DL — SIGNIFICANT CHANGE UP (ref 0.4–2)
BLD GP AB SCN SERPL QL: SIGNIFICANT CHANGE UP
BUN SERPL-MCNC: 15.6 MG/DL — SIGNIFICANT CHANGE UP (ref 8–20)
CALCIUM SERPL-MCNC: 9.6 MG/DL — SIGNIFICANT CHANGE UP (ref 8.4–10.5)
CHLORIDE SERPL-SCNC: 102 MMOL/L — SIGNIFICANT CHANGE UP (ref 96–108)
CO2 SERPL-SCNC: 26 MMOL/L — SIGNIFICANT CHANGE UP (ref 22–29)
CREAT SERPL-MCNC: 0.47 MG/DL — LOW (ref 0.5–1.3)
EGFR: 109 ML/MIN/1.73M2 — SIGNIFICANT CHANGE UP
EGFR: 109 ML/MIN/1.73M2 — SIGNIFICANT CHANGE UP
EOSINOPHIL # BLD AUTO: 0.18 K/UL — SIGNIFICANT CHANGE UP (ref 0–0.5)
EOSINOPHIL NFR BLD AUTO: 2.7 % — SIGNIFICANT CHANGE UP (ref 0–6)
ESTIMATED AVERAGE GLUCOSE: 111 MG/DL — SIGNIFICANT CHANGE UP (ref 68–114)
GLUCOSE SERPL-MCNC: 88 MG/DL — SIGNIFICANT CHANGE UP (ref 70–99)
HCT VFR BLD CALC: 41.3 % — SIGNIFICANT CHANGE UP (ref 34.5–45)
HGB BLD-MCNC: 13.4 G/DL — SIGNIFICANT CHANGE UP (ref 11.5–15.5)
IMM GRANULOCYTES # BLD AUTO: 0.01 K/UL — SIGNIFICANT CHANGE UP (ref 0–0.07)
IMM GRANULOCYTES NFR BLD AUTO: 0.1 % — SIGNIFICANT CHANGE UP (ref 0–0.9)
INR BLD: 0.97 RATIO — SIGNIFICANT CHANGE UP (ref 0.85–1.16)
LYMPHOCYTES # BLD AUTO: 1.57 K/UL — SIGNIFICANT CHANGE UP (ref 1–3.3)
LYMPHOCYTES NFR BLD AUTO: 23.4 % — SIGNIFICANT CHANGE UP (ref 13–44)
MCHC RBC-ENTMCNC: 28.6 PG — SIGNIFICANT CHANGE UP (ref 27–34)
MCHC RBC-ENTMCNC: 32.4 G/DL — SIGNIFICANT CHANGE UP (ref 32–36)
MCV RBC AUTO: 88.1 FL — SIGNIFICANT CHANGE UP (ref 80–100)
MONOCYTES # BLD AUTO: 0.52 K/UL — SIGNIFICANT CHANGE UP (ref 0–0.9)
MONOCYTES NFR BLD AUTO: 7.7 % — SIGNIFICANT CHANGE UP (ref 2–14)
MRSA PCR RESULT.: SIGNIFICANT CHANGE UP
NEUTROPHILS # BLD AUTO: 4.4 K/UL — SIGNIFICANT CHANGE UP (ref 1.8–7.4)
NEUTROPHILS NFR BLD AUTO: 65.5 % — SIGNIFICANT CHANGE UP (ref 43–77)
NRBC # BLD AUTO: 0 K/UL — SIGNIFICANT CHANGE UP (ref 0–0)
NRBC # FLD: 0 K/UL — SIGNIFICANT CHANGE UP (ref 0–0)
NRBC BLD AUTO-RTO: 0 /100 WBCS — SIGNIFICANT CHANGE UP (ref 0–0)
PLATELET # BLD AUTO: 284 K/UL — SIGNIFICANT CHANGE UP (ref 150–400)
PMV BLD: 10 FL — SIGNIFICANT CHANGE UP (ref 7–13)
POTASSIUM SERPL-MCNC: 4.2 MMOL/L — SIGNIFICANT CHANGE UP (ref 3.5–5.3)
POTASSIUM SERPL-SCNC: 4.2 MMOL/L — SIGNIFICANT CHANGE UP (ref 3.5–5.3)
PROT SERPL-MCNC: 6.7 G/DL — SIGNIFICANT CHANGE UP (ref 6.6–8.7)
PROTHROM AB SERPL-ACNC: 11.2 SEC — SIGNIFICANT CHANGE UP (ref 9.9–13.4)
RBC # BLD: 4.69 M/UL — SIGNIFICANT CHANGE UP (ref 3.8–5.2)
RBC # FLD: 14.6 % — HIGH (ref 10.3–14.5)
S AUREUS DNA NOSE QL NAA+PROBE: SIGNIFICANT CHANGE UP
SODIUM SERPL-SCNC: 141 MMOL/L — SIGNIFICANT CHANGE UP (ref 135–145)
WBC # BLD: 6.72 K/UL — SIGNIFICANT CHANGE UP (ref 3.8–10.5)
WBC # FLD AUTO: 6.72 K/UL — SIGNIFICANT CHANGE UP (ref 3.8–10.5)

## 2025-05-15 PROCEDURE — 86900 BLOOD TYPING SEROLOGIC ABO: CPT

## 2025-05-15 PROCEDURE — 85025 COMPLETE CBC W/AUTO DIFF WBC: CPT

## 2025-05-15 PROCEDURE — 36415 COLL VENOUS BLD VENIPUNCTURE: CPT

## 2025-05-15 PROCEDURE — 80053 COMPREHEN METABOLIC PANEL: CPT

## 2025-05-15 PROCEDURE — 85730 THROMBOPLASTIN TIME PARTIAL: CPT

## 2025-05-15 PROCEDURE — 87641 MR-STAPH DNA AMP PROBE: CPT

## 2025-05-15 PROCEDURE — 85610 PROTHROMBIN TIME: CPT

## 2025-05-15 PROCEDURE — 86901 BLOOD TYPING SEROLOGIC RH(D): CPT

## 2025-05-15 PROCEDURE — 93005 ELECTROCARDIOGRAM TRACING: CPT

## 2025-05-15 PROCEDURE — 86850 RBC ANTIBODY SCREEN: CPT

## 2025-05-15 PROCEDURE — 93010 ELECTROCARDIOGRAM REPORT: CPT

## 2025-05-15 PROCEDURE — 83036 HEMOGLOBIN GLYCOSYLATED A1C: CPT

## 2025-05-15 PROCEDURE — G0463: CPT

## 2025-05-15 PROCEDURE — 87640 STAPH A DNA AMP PROBE: CPT

## 2025-05-15 NOTE — H&P PST ADULT - HISTORY OF PRESENT ILLNESS
59 year old female presents to office 3 months status post R TKA, DOS 2/13/25. Overall the patient is doing well. States pain is well controlled. Has been taking Tylenol and Tramadol for pain. Ambulating with the use of a cane. Has stopped taking Eliquis for DVT prophylaxis as directed. Has been participating in physical therapy. Denies anyrecent injuries, falls, trauma, incisional issues, erythema/drainage/discharge, chest pain, shortness of breath, swelling, calf tenderness, neurovascular compromise. September/October right knee pain first, PT, Injections (none since November 2024), ice, R TKA February 2025.   10 out of 10, stabbing/burning, out of place, Tramadol and Tylenol  swelling, pain with sitting, with aa lot of ambulation and a lot of standing  exterior  problems with getting in and out of car        59 year old female presents to office 3 months status post R TKA, DOS 2/13/25. Overall the patient is doing well. States pain is well controlled. Has been taking Tylenol and Tramadol for pain. Ambulating with the use of a cane. Has stopped taking Eliquis for DVT prophylaxis as directed. Has been participating in physical therapy. Denies anyrecent injuries, falls, trauma, incisional issues, erythema/drainage/discharge, chest pain, shortness of breath, swelling, calf tenderness, neurovascular compromise. 59 year old patient with a PMH of anxiety/depression, migraines, gastric ulcers presents today preop for left total knee replacement with MARCELA with Dr. Hernandez for unilateral primary osteoarthritis left knee. Day of surgery is 5/29/25. Patient states she originally presented with bilateral knee pain in September 2024. - denies trauma/falls, just endorses arthritis and wear and tear. Patient subsequently got a right MARCELA knee replacement first in February 2025. Patient has recovering well and is very happy with her right knee replacement surgery thus far. Now, she presents to get her left knee treated. Patient states she has tried different treatment modalities for left knee - PT, PT, injections (none since November 2024), Tramadol, Tylenol, ice - all have yielded minimal relief. Patient states at its worst pain can be a 10 out of 10, stabbing/burning pain mostly on the exterior side of her knee. Patient ambulates with a cane. Pain is worst with ambulation and standing for prolonged periods of time. Off note - patient was taking Eliquis for DVT prophylaxis but now has stopped. Denies chest pain, shortness of breath, fever and/or chills.

## 2025-05-15 NOTE — H&P PST ADULT - ASSESSMENT
59 year old patient with a PMH of anxiety/depression, migraines, gastric ulcers presents today preop for left total knee replacement with MARCELA with Dr. Hernandez for unilateral primary osteoarthritis left knee. Day of surgery is 25.     Surgical protocol reviewed with patient today.  Patient to follow up with PCP for evaluation and clearance prior to surgery.  Patient on Ozempic - indicated last dose should be May 20, 2025.  Patient instructed on NPO protocol.  Patient instructed on liquid protocol.  Patient instructed to stop NSAID, all vitamin supplements, Fish oil, CoQ 10, herbals 5 days before this surgery.  Patient okay to take Tylenol as needed for pain.  Patient instructed on infection prevention.  Chlorhexidine scrub instructions provided.    CAPRINI SCORE    AGE RELATED RISK FACTORS                                                             [xx ] Age 41-60 years                                            (1 Point)  [ ] Age: 61-74 years                                           (2 Points)                 [ ] Age= 75 years                                                (3 Points)             DISEASE RELATED RISK FACTORS                                                       [ ] Edema in the lower extremities                 (1 Point)                     [ xx] Varicose veins                                               (1 Point)                                 [ xx] BMI > 25 Kg/m2                                            (1 Point)                                  [ ] Serious infection (ie PNA)                            (1 Point)                     [ ] Lung disease ( COPD, Emphysema)            (1 Point)                                                                          [ ] Acute myocardial infarction                         (1 Point)                  [ ] Congestive heart failure (in the previous month)  (1 Point)         [ ] Inflammatory bowel disease                            (1 Point)                  [ ] Central venous access, PICC or Port               (2 points)       (within the last month)                                                                [ ] Stroke (in the previous month)                        (5 Points)    [ ] Previous or present malignancy                       (2 points)                                                                                                                                                         HEMATOLOGY RELATED FACTORS                                                         [ ] Prior episodes of VTE                                     (3 Points)                     [ ] Positive family history for VTE                      (3 Points)                  [ ] Prothrombin 20922 A                                     (3 Points)                     [ ] Factor V Leiden                                                (3 Points)                        [ ] Lupus anticoagulants                                      (3 Points)                                                           [ ] Anticardiolipin antibodies                              (3 Points)                                                       [ ] High homocysteine in the blood                   (3 Points)                                             [ ] Other congenital or acquired thrombophilia      (3 Points)                                                [ ] Heparin induced thrombocytopenia                  (3 Points)                                        MOBILITY RELATED FACTORS  [ ] Bed rest                                                         (1 Point)  [ ] Plaster cast                                                    (2 points)  [ ] Bed bound for more than 72 hours           (2 Points)    GENDER SPECIFIC FACTORS  [ ] Pregnancy or had a baby within the last month   (1 Point)  [ ] Post-partum < 6 weeks                                   (1 Point)  [ ] Hormonal therapy  or oral contraception   (1 Point)  [ ] History of pregnancy complications              (1 point)  [ ] Unexplained or recurrent              (1 Point)    OTHER RISK FACTORS                                           (1 Point)  [xx ] BMI >40, smoking, diabetes requiring insulin, chemotherapy  blood transfusions and length of surgery over 2 hours    SURGERY RELATED RISK FACTORS  [ ]  Section within the last month     (1 Point)  [ ] Minor surgery                                                  (1 Point)  [ ] Arthroscopic surgery                                       (2 Points)  [ ] Planned major surgery lasting more            (2 Points)      than 45 minutes     [xx ] Elective hip or knee joint replacement       (5 points)       surgery                                                TRAUMA RELATED RISK FACTORS  [ ] Fracture of the hip, pelvis, or leg                       (5 Points)  [ ] Spinal cord injury resulting in paralysis             (5 points)       (in the previous month)    [ ] Paralysis  (less than 1 month)                             (5 Points)  [ ] Multiple Trauma within 1 month                        (5 Points)    Total Score [   9     ]    Caprini Score 0-2: Low Risk, NO VTE prophylaxis required for most patients, encourage ambulation  Caprini Score 3-6: Moderate Risk , pharmacologic VTE prophylaxis is indicated for most patients (in the absence of contraindications)  Caprini Score Greater than or =7: High risk, pharmocologic VTE prophylaxis indicated for most patients (in the absence of contraindications)    OPIOID RISK TOOL    ENID EACH BOX THAT APPLIES AND ADD TOTALS AT THE END    FAMILY HISTORY OF SUBSTANCE ABUSE                 FEMALE         MALE                                                Alcohol                             [  ]1 pt          [  ]3pts                                               Illegal Durgs                     [  ]2 pts        [  ]3pts                                               Rx Drugs                           [  ]4 pts        [  ]4 pts    PERSONAL HISTORY OF SUBSTANCE ABUSE                                                                                          Alcohol                             [  ]3 pts       [  ]3 pts                                               Illegal Drugs                     [  ]4 pts        [  ]4 pts                                               Rx Drugs                           [  ]5 pts        [  ]5 pts    AGE BETWEEN 16-45 YEARS                                      [  ]1 pt         [  ]1 pt    HISTORY OF PREADOLESCENT   SEXUAL ABUSE                                                             [  ]3 pts        [  ]0pts    PSYCHOLOGICAL DISEASE                     ADD, OCD, Bipolar, Schizophrenia        [  ]2 pts         [  ]2 pts                      Depression                                               [xx  ]1 pt           [  ]1 pt           SCORING TOTAL   (add numbers and type here)              (*1**)                                     A score of 3 or lower indicated LOW risk for future opioid abuse  A score of 4 to 7 indicated moderate risk for future opioid abuse  A score of 8 or higher indicates a high risk for opioid abuse

## 2025-05-15 NOTE — H&P PST ADULT - PROBLEM SELECTOR PLAN 1
left total knee replacement with MARCELA with Dr. Hernandez for unilateral primary osteoarthritis left knee. Day of surgery is 5/29/25.

## 2025-05-15 NOTE — H&P PST ADULT - NSICDXPASTSURGICALHX_GEN_ALL_CORE_FT
PAST SURGICAL HISTORY:  H/O endoscopy     H/O gastric bypass     H/O gastric sleeve     H/O right knee surgery     History of cholecystectomy     History of hysterectomy

## 2025-05-23 ENCOUNTER — APPOINTMENT (OUTPATIENT)
Dept: INTERNAL MEDICINE | Facility: CLINIC | Age: 60
End: 2025-05-23
Payer: MEDICAID

## 2025-05-23 VITALS
WEIGHT: 212 LBS | RESPIRATION RATE: 15 BRPM | TEMPERATURE: 98 F | HEART RATE: 74 BPM | DIASTOLIC BLOOD PRESSURE: 82 MMHG | SYSTOLIC BLOOD PRESSURE: 124 MMHG | OXYGEN SATURATION: 97 % | HEIGHT: 66 IN | BODY MASS INDEX: 34.07 KG/M2

## 2025-05-23 DIAGNOSIS — G43.909 MIGRAINE, UNSPECIFIED, NOT INTRACTABLE, W/OUT STATUS MIGRAINOSUS: ICD-10-CM

## 2025-05-23 DIAGNOSIS — Z01.818 ENCOUNTER FOR OTHER PREPROCEDURAL EXAMINATION: ICD-10-CM

## 2025-05-23 DIAGNOSIS — E66.9 OBESITY, UNSPECIFIED: ICD-10-CM

## 2025-05-23 DIAGNOSIS — F41.8 OTHER SPECIFIED ANXIETY DISORDERS: ICD-10-CM

## 2025-05-23 DIAGNOSIS — I10 ESSENTIAL (PRIMARY) HYPERTENSION: ICD-10-CM

## 2025-05-23 PROCEDURE — G2211 COMPLEX E/M VISIT ADD ON: CPT | Mod: NC

## 2025-05-23 PROCEDURE — 99203 OFFICE O/P NEW LOW 30 MIN: CPT

## 2025-05-28 ENCOUNTER — APPOINTMENT (OUTPATIENT)
Dept: FAMILY MEDICINE | Facility: CLINIC | Age: 60
End: 2025-05-28

## 2025-05-28 RX ORDER — KETOROLAC TROMETHAMINE 30 MG/ML
15 INJECTION, SOLUTION INTRAMUSCULAR; INTRAVENOUS ONCE
Refills: 0 | Status: DISCONTINUED | OUTPATIENT
Start: 2025-05-29 | End: 2025-05-30

## 2025-05-28 RX ORDER — POVIDONE-IODINE 7.5 %
1 SOLUTION, NON-ORAL TOPICAL ONCE
Refills: 0 | Status: COMPLETED | OUTPATIENT
Start: 2025-05-29 | End: 2025-05-29

## 2025-05-28 NOTE — PATIENT PROFILE ADULT - FALL HARM RISK - HARM RISK INTERVENTIONS

## 2025-05-29 ENCOUNTER — APPOINTMENT (OUTPATIENT)
Dept: ORTHOPEDIC SURGERY | Facility: HOSPITAL | Age: 60
End: 2025-05-29

## 2025-05-29 ENCOUNTER — TRANSCRIPTION ENCOUNTER (OUTPATIENT)
Age: 60
End: 2025-05-29

## 2025-05-29 ENCOUNTER — INPATIENT (INPATIENT)
Facility: HOSPITAL | Age: 60
LOS: 0 days | Discharge: HOME | DRG: 554 | End: 2025-05-30
Attending: STUDENT IN AN ORGANIZED HEALTH CARE EDUCATION/TRAINING PROGRAM | Admitting: STUDENT IN AN ORGANIZED HEALTH CARE EDUCATION/TRAINING PROGRAM
Payer: COMMERCIAL

## 2025-05-29 VITALS
DIASTOLIC BLOOD PRESSURE: 75 MMHG | WEIGHT: 209.44 LBS | SYSTOLIC BLOOD PRESSURE: 110 MMHG | TEMPERATURE: 98 F | HEART RATE: 71 BPM | HEIGHT: 65 IN | OXYGEN SATURATION: 97 % | RESPIRATION RATE: 20 BRPM

## 2025-05-29 DIAGNOSIS — M17.12 UNILATERAL PRIMARY OSTEOARTHRITIS, LEFT KNEE: ICD-10-CM

## 2025-05-29 DIAGNOSIS — Z98.890 OTHER SPECIFIED POSTPROCEDURAL STATES: Chronic | ICD-10-CM

## 2025-05-29 DIAGNOSIS — Z90.710 ACQUIRED ABSENCE OF BOTH CERVIX AND UTERUS: Chronic | ICD-10-CM

## 2025-05-29 DIAGNOSIS — Z90.3 ACQUIRED ABSENCE OF STOMACH [PART OF]: Chronic | ICD-10-CM

## 2025-05-29 DIAGNOSIS — Z98.84 BARIATRIC SURGERY STATUS: Chronic | ICD-10-CM

## 2025-05-29 PROCEDURE — 99222 1ST HOSP IP/OBS MODERATE 55: CPT

## 2025-05-29 PROCEDURE — 27447 TOTAL KNEE ARTHROPLASTY: CPT | Mod: AS,LT

## 2025-05-29 PROCEDURE — 27447 TOTAL KNEE ARTHROPLASTY: CPT | Mod: LT

## 2025-05-29 PROCEDURE — 20985 CPTR-ASST DIR MS PX: CPT

## 2025-05-29 PROCEDURE — 73560 X-RAY EXAM OF KNEE 1 OR 2: CPT | Mod: 26,LT

## 2025-05-29 DEVICE — MAKO BONE PIN 4MM X 140MM: Type: IMPLANTABLE DEVICE | Site: LEFT | Status: FUNCTIONAL

## 2025-05-29 DEVICE — TRIATHLON TIBIAL COMP SZ 3: Type: IMPLANTABLE DEVICE | Site: LEFT | Status: FUNCTIONAL

## 2025-05-29 DEVICE — INSERT TIB BEARING TRIATHLON CS X3 SZ 3 9MM: Type: IMPLANTABLE DEVICE | Site: LEFT | Status: FUNCTIONAL

## 2025-05-29 DEVICE — MAKO BONE PIN 4MM X 80MM: Type: IMPLANTABLE DEVICE | Site: LEFT | Status: FUNCTIONAL

## 2025-05-29 DEVICE — CEMENT PALACOS R: Type: IMPLANTABLE DEVICE | Site: LEFT | Status: FUNCTIONAL

## 2025-05-29 DEVICE — GUIDE PIN/SCREW ORTHO 3.2 X 37MM: Type: IMPLANTABLE DEVICE | Site: LEFT | Status: FUNCTIONAL

## 2025-05-29 DEVICE — COMP FEM CR CMNTLSS BEADED W/ PA SZ 3 LT: Type: IMPLANTABLE DEVICE | Site: LEFT | Status: FUNCTIONAL

## 2025-05-29 DEVICE — IMP PATELLA ASYMMETRIC X3 35X10MM: Type: IMPLANTABLE DEVICE | Site: LEFT | Status: FUNCTIONAL

## 2025-05-29 RX ORDER — AMITRIPTYLINE HYDROCHLORIDE 25 MG/1
100 TABLET, FILM COATED ORAL AT BEDTIME
Refills: 0 | Status: DISCONTINUED | OUTPATIENT
Start: 2025-05-29 | End: 2025-05-30

## 2025-05-29 RX ORDER — CEFAZOLIN SODIUM IN 0.9 % NACL 3 G/100 ML
2000 INTRAVENOUS SOLUTION, PIGGYBACK (ML) INTRAVENOUS ONCE
Refills: 0 | Status: DISCONTINUED | OUTPATIENT
Start: 2025-05-29 | End: 2025-05-29

## 2025-05-29 RX ORDER — APREPITANT 40 MG/1
40 CAPSULE ORAL ONCE
Refills: 0 | Status: COMPLETED | OUTPATIENT
Start: 2025-05-29 | End: 2025-05-29

## 2025-05-29 RX ORDER — SUMATRIPTAN 100 MG/1
100 TABLET, FILM COATED ORAL AT BEDTIME
Refills: 0 | Status: DISCONTINUED | OUTPATIENT
Start: 2025-05-29 | End: 2025-05-30

## 2025-05-29 RX ORDER — ORAL SEMAGLUTIDE 14 MG/1
1 TABLET ORAL
Refills: 0 | DISCHARGE

## 2025-05-29 RX ORDER — ONDANSETRON HCL/PF 4 MG/2 ML
4 VIAL (ML) INJECTION EVERY 6 HOURS
Refills: 0 | Status: DISCONTINUED | OUTPATIENT
Start: 2025-05-29 | End: 2025-05-30

## 2025-05-29 RX ORDER — ONDANSETRON HCL/PF 4 MG/2 ML
4 VIAL (ML) INJECTION ONCE
Refills: 0 | Status: DISCONTINUED | OUTPATIENT
Start: 2025-05-29 | End: 2025-05-29

## 2025-05-29 RX ORDER — CEFAZOLIN SODIUM IN 0.9 % NACL 3 G/100 ML
2000 INTRAVENOUS SOLUTION, PIGGYBACK (ML) INTRAVENOUS
Refills: 0 | Status: COMPLETED | OUTPATIENT
Start: 2025-05-29 | End: 2025-05-30

## 2025-05-29 RX ORDER — HYDROMORPHONE/SOD CHLOR,ISO/PF 2 MG/10 ML
0.5 SYRINGE (ML) INJECTION
Refills: 0 | Status: DISCONTINUED | OUTPATIENT
Start: 2025-05-29 | End: 2025-05-29

## 2025-05-29 RX ORDER — MAGNESIUM HYDROXIDE 400 MG/5ML
30 SUSPENSION ORAL DAILY
Refills: 0 | Status: DISCONTINUED | OUTPATIENT
Start: 2025-05-29 | End: 2025-05-30

## 2025-05-29 RX ORDER — TRAZODONE HCL 100 MG
100 TABLET ORAL AT BEDTIME
Refills: 0 | Status: DISCONTINUED | OUTPATIENT
Start: 2025-05-29 | End: 2025-05-30

## 2025-05-29 RX ORDER — SODIUM CHLORIDE 9 G/1000ML
1000 INJECTION, SOLUTION INTRAVENOUS
Refills: 0 | Status: DISCONTINUED | OUTPATIENT
Start: 2025-05-29 | End: 2025-05-29

## 2025-05-29 RX ORDER — APIXABAN 2.5 MG/1
2.5 TABLET, FILM COATED ORAL
Refills: 0 | Status: DISCONTINUED | OUTPATIENT
Start: 2025-05-30 | End: 2025-05-30

## 2025-05-29 RX ORDER — SENNA 187 MG
2 TABLET ORAL AT BEDTIME
Refills: 0 | Status: DISCONTINUED | OUTPATIENT
Start: 2025-05-29 | End: 2025-05-30

## 2025-05-29 RX ORDER — OXYCODONE HYDROCHLORIDE 30 MG/1
5 TABLET ORAL
Refills: 0 | Status: DISCONTINUED | OUTPATIENT
Start: 2025-05-29 | End: 2025-05-30

## 2025-05-29 RX ORDER — KETOROLAC TROMETHAMINE 30 MG/ML
15 INJECTION, SOLUTION INTRAMUSCULAR; INTRAVENOUS EVERY 6 HOURS
Refills: 0 | Status: COMPLETED | OUTPATIENT
Start: 2025-05-29 | End: 2025-05-30

## 2025-05-29 RX ORDER — OXYCODONE HYDROCHLORIDE 30 MG/1
10 TABLET ORAL
Refills: 0 | Status: DISCONTINUED | OUTPATIENT
Start: 2025-05-29 | End: 2025-05-30

## 2025-05-29 RX ORDER — FENTANYL CITRATE-0.9 % NACL/PF 100MCG/2ML
25 SYRINGE (ML) INTRAVENOUS
Refills: 0 | Status: DISCONTINUED | OUTPATIENT
Start: 2025-05-29 | End: 2025-05-29

## 2025-05-29 RX ORDER — ACETAMINOPHEN 500 MG/5ML
1000 LIQUID (ML) ORAL ONCE
Refills: 0 | Status: COMPLETED | OUTPATIENT
Start: 2025-05-29 | End: 2025-05-30

## 2025-05-29 RX ORDER — TRANEXAMIC ACID 1000 MG/10
1000 AMPUL (ML) INTRAVENOUS ONCE
Refills: 0 | Status: DISCONTINUED | OUTPATIENT
Start: 2025-05-29 | End: 2025-05-29

## 2025-05-29 RX ORDER — ACETAMINOPHEN 500 MG/5ML
975 LIQUID (ML) ORAL ONCE
Refills: 0 | Status: COMPLETED | OUTPATIENT
Start: 2025-05-29 | End: 2025-05-29

## 2025-05-29 RX ORDER — FLUOXETINE HYDROCHLORIDE 20 MG/1
40 CAPSULE ORAL AT BEDTIME
Refills: 0 | Status: DISCONTINUED | OUTPATIENT
Start: 2025-05-29 | End: 2025-05-30

## 2025-05-29 RX ORDER — POLYETHYLENE GLYCOL 3350 17 G/17G
17 POWDER, FOR SOLUTION ORAL AT BEDTIME
Refills: 0 | Status: DISCONTINUED | OUTPATIENT
Start: 2025-05-29 | End: 2025-05-30

## 2025-05-29 RX ORDER — ACETAMINOPHEN 500 MG/5ML
975 LIQUID (ML) ORAL EVERY 8 HOURS
Refills: 0 | Status: DISCONTINUED | OUTPATIENT
Start: 2025-05-29 | End: 2025-05-30

## 2025-05-29 RX ORDER — CELECOXIB 50 MG/1
200 CAPSULE ORAL EVERY 12 HOURS
Refills: 0 | Status: DISCONTINUED | OUTPATIENT
Start: 2025-05-31 | End: 2025-05-30

## 2025-05-29 RX ORDER — TRAMADOL HYDROCHLORIDE 50 MG/1
50 TABLET, FILM COATED ORAL EVERY 4 HOURS
Refills: 0 | Status: DISCONTINUED | OUTPATIENT
Start: 2025-05-29 | End: 2025-05-30

## 2025-05-29 RX ADMIN — Medication 975 MILLIGRAM(S): at 08:21

## 2025-05-29 RX ADMIN — FLUOXETINE HYDROCHLORIDE 40 MILLIGRAM(S): 20 CAPSULE ORAL at 22:18

## 2025-05-29 RX ADMIN — Medication 2 TABLET(S): at 22:19

## 2025-05-29 RX ADMIN — AMITRIPTYLINE HYDROCHLORIDE 100 MILLIGRAM(S): 25 TABLET, FILM COATED ORAL at 22:18

## 2025-05-29 RX ADMIN — Medication 1 APPLICATION(S): at 07:43

## 2025-05-29 RX ADMIN — OXYCODONE HYDROCHLORIDE 5 MILLIGRAM(S): 30 TABLET ORAL at 21:15

## 2025-05-29 RX ADMIN — Medication 500 MILLILITER(S): at 12:25

## 2025-05-29 RX ADMIN — POLYETHYLENE GLYCOL 3350 17 GRAM(S): 17 POWDER, FOR SOLUTION ORAL at 22:19

## 2025-05-29 RX ADMIN — Medication 2000 MILLIGRAM(S): at 17:10

## 2025-05-29 RX ADMIN — OXYCODONE HYDROCHLORIDE 5 MILLIGRAM(S): 30 TABLET ORAL at 16:43

## 2025-05-29 RX ADMIN — OXYCODONE HYDROCHLORIDE 5 MILLIGRAM(S): 30 TABLET ORAL at 20:15

## 2025-05-29 RX ADMIN — KETOROLAC TROMETHAMINE 15 MILLIGRAM(S): 30 INJECTION, SOLUTION INTRAMUSCULAR; INTRAVENOUS at 17:10

## 2025-05-29 RX ADMIN — SODIUM CHLORIDE 75 MILLILITER(S): 9 INJECTION, SOLUTION INTRAVENOUS at 12:45

## 2025-05-29 RX ADMIN — Medication 975 MILLIGRAM(S): at 23:36

## 2025-05-29 RX ADMIN — Medication 3 MILLILITER(S): at 08:36

## 2025-05-29 RX ADMIN — APREPITANT 40 MILLIGRAM(S): 40 CAPSULE ORAL at 08:21

## 2025-05-29 RX ADMIN — Medication 975 MILLIGRAM(S): at 22:17

## 2025-05-29 NOTE — PHYSICAL THERAPY INITIAL EVALUATION ADULT - GENERAL OBSERVATIONS, REHAB EVAL
Pt received in stretcher with patient transport + IV  + VCBs, breathing on RA in NAD, daughter present to interpret per pt request as she speaks Welsh, in 0/10 pain, agreeable to PT evaluation

## 2025-05-29 NOTE — DISCHARGE NOTE PROVIDER - CARE PROVIDER_API CALL
Clarence Hernandez  Joint Reconstruction  [address] [place] [address]  Phone: [phone]  Fax: [phone]  Follow Up Time:

## 2025-05-29 NOTE — PROVIDER CONTACT NOTE (OTHER) - SITUATION
Attended joint education session on 5/21/2025 via online method with opportunity to ask questions. Contact information for follow up questions given.

## 2025-05-29 NOTE — DISCHARGE NOTE PROVIDER - DISCHARGE DATE
Telemetry Shift Summary    Rhythm A-fib   HR Range 68-96  Ectopy PVCs, Bigeminy PVCs  Measurements -/.08/-        Normal Values  Rhythm SR  HR Range    Measurements 0.12-0.20 / 0.06-0.10  / 0.30-0.52     30-May-2025

## 2025-05-29 NOTE — DISCHARGE NOTE PROVIDER - NSDCFUADDINST_GEN_ALL_CORE_FT
The patient will be seen in the office between 2-3 weeks for wound check.   **Your first post-operative visit has been scheduled prior to your admission. PLEASE CONTACT OFFICE TO CONFIRM THE APPOINTMENT DATE. Sutures/Staples/Tape will be removed at that time.  **  The silver based dressing is to be removed 7 days from the date of surgery.   ** CONTACT THE OFFICE IF THE FOLLOWING DEVELOP:  - the dressing becomes soiled or saturated  - you develop a fever greater that 101F  - the wound becomes red or you develop blistering around the wound  * Patient may shower after post-op day #3.   * The patient will continue home PT consistent with  total knee replacement protocol. Transition to outpatient PT will occur at the time of the first office visit.   * The patient will practice knee extension exercises regularly to minimize hamstring contraction.   * The patient is FULL weight bearing.  * The patient will continue ELIQUIS for 2 weeks after surgery for blood clot prevention.  * While on aspirin, the patient will take daily omeprazole or other similar medication to protect the stomach from irritation.   * The patient will take OXYCODONE for pain control and adjust according to prescription and patient needs. The patient will also take TYLENOL 975mg every 8 hours for pain control. Contact the office if pain increases while taking prescribed pain medications or related concerns develop.  * Celebrex will be taken twice daily for 3 weeks for pain control and prevention of excessive bone growth. Additional prescription may be requested at your office follow-up visit.   * The patient will take Senna S while taking oxycodone to prevent narcotic associated constipation.  Additionally, increase water intake (drink at least 8 glasses of water daily) and try adding fiber to the diet by eating fruits, vegetables and foods that are rich in grains. If constipation is experienced, contact the medical/primary care provider to discuss further treatment options.  * To avoid injury at home:  - continue use of rolling walker until cleared by physical therapist  - have family or friend remove all throw rug or objects in hallways that may present a trip hazard.  - if you experience any dizziness or medical concerns, call your medical doctor or  911.  * The implant may activate metal detection devices.  The patient will be seen in the office between 2-3 weeks for wound check.   **Your first post-operative visit has been scheduled prior to your admission. PLEASE CONTACT OFFICE TO CONFIRM THE APPOINTMENT DATE. Sutures/Staples/Tape will be removed at that time.  **  The silver based dressing is to be removed 7 days from the date of surgery.   ** CONTACT THE OFFICE IF THE FOLLOWING DEVELOP:  - the dressing becomes soiled or saturated  - you develop a fever greater that 101F  - the wound becomes red or you develop blistering around the wound  * Patient may shower after post-op day #3.   * The patient will continue home PT consistent with  total knee replacement protocol. Transition to outpatient PT will occur at the time of the first office visit.   * The patient will practice knee extension exercises regularly to minimize hamstring contraction.   * The patient is FULL weight bearing.  * The patient will continue ELIQUIS 2.5mg twice daily for 2 weeks after surgery for blood clot prevention.  * While on anticoagulation, the patient will take daily omeprazole or other similar medication to protect the stomach from irritation.   * The patient will take OXYCODONE for pain control and adjust according to prescription and patient needs. The patient will also take TYLENOL 975mg every 8 hours for pain control. Contact the office if pain increases while taking prescribed pain medications or related concerns develop.  * Celebrex will be taken twice daily for 3 weeks for pain control and prevention of excessive bone growth. Additional prescription may be requested at your office follow-up visit.   * The patient will take Senna S while taking oxycodone to prevent narcotic associated constipation.  Additionally, increase water intake (drink at least 8 glasses of water daily) and try adding fiber to the diet by eating fruits, vegetables and foods that are rich in grains. If constipation is experienced, contact the medical/primary care provider to discuss further treatment options.  * To avoid injury at home:  - continue use of rolling walker until cleared by physical therapist  - have family or friend remove all throw rug or objects in hallways that may present a trip hazard.  - if you experience any dizziness or medical concerns, call your medical doctor or  911.  * The implant may activate metal detection devices.

## 2025-05-29 NOTE — DISCHARGE NOTE PROVIDER - DISCHARGE SERVICE FOR PATIENT
Syncope
Syncope
on the discharge service for the patient. I have reviewed and made amendments to the documentation where necessary.

## 2025-05-29 NOTE — CONSULT NOTE ADULT - SUBJECTIVE AND OBJECTIVE BOX
Patient is a 59y old  Female who S/P L TKA , POD#0. Tolerated procedure well .     CC: L knee chronic pain     HPI:  59 year old patient with a PMH of anxiety/depression, migraines, gastric ulcers , L knee chronic pain . Patient states she originally presented with bilateral knee pain in September 2024. - denies trauma/falls, just endorses arthritis and wear and tear. Patient subsequently got a right MARCELA knee replacement first in February 2025. Patient has recovering well and is very happy with her right knee replacement surgery thus far. Now, she presents to get her left knee treated. Patient states she has tried different treatment modalities for left knee - PT, PT, injections (none since November 2024), Tramadol, Tylenol, ice - all have yielded minimal relief.       PAST MEDICAL & SURGICAL HISTORY:  Migraines      Anxiety and depression      Gastric ulcer      Deafness in left ear      History of hysterectomy      H/O gastric sleeve      H/O gastric bypass      History of cholecystectomy      H/O right knee surgery      H/O endoscopy          Social History:  Tobacco - denies   ETOH - denies   Illicit drug abuse - denies    FAMILY HISTORY:  FHx: liver cancer (Sibling)        Allergies    No Known Allergies    Intolerances    HOME MEDICATIONS :     amitriptyline 100 mg oral tablet: 1 tab(s) orally once a day (at bedtime) (29 May 2025 08:19)  FLUoxetine 40 mg oral capsule: 1 cap(s) orally once a day (at bedtime) (29 May 2025 08:19)  multivitamin daily:  (29 May 2025 08:19)  omeprazole 40 mg oral delayed release capsule: 0.5 cap(s) orally once a day (29 May 2025 08:19)  Ozempic 2 mg/1.5 mL (1 mg dose) subcutaneous solution: 1 milligram(s) subcutaneous every 7 days (29 May 2025 08:19)  SUMAtriptan 100 mg oral tablet: 1 tab(s) orally once a day (at bedtime) as needed for migraine (29 May 2025 08:19)  traZODone 50 mg oral tablet: 2 tab(s) orally once a day (at bedtime) as needed for  insomnia (29 May 2025 08:19)      REVIEW OF SYSTEMS:    L knee chronic pain , all other systems are reviewed and are negative .    MEDICATIONS  (STANDING):  acetaminophen     Tablet .. 975 milliGRAM(s) Oral every 8 hours  acetaminophen   IVPB .. 1000 milliGRAM(s) IV Intermittent once  amitriptyline 100 milliGRAM(s) Oral at bedtime  ceFAZolin  Injectable. 2000 milliGRAM(s) IV Push <User Schedule>  FLUoxetine 40 milliGRAM(s) Oral at bedtime  ketorolac   Injectable 15 milliGRAM(s) IV Push once  ketorolac   Injectable 15 milliGRAM(s) IV Push every 6 hours  lactated ringers. 1000 milliLiter(s) (75 mL/Hr) IV Continuous <Continuous>  pantoprazole    Tablet 40 milliGRAM(s) Oral before breakfast  polyethylene glycol 3350 17 Gram(s) Oral at bedtime  senna 2 Tablet(s) Oral at bedtime  sodium chloride 0.9%. 1000 milliLiter(s) (100 mL/Hr) IV Continuous <Continuous>    MEDICATIONS  (PRN):  fentaNYL    Injectable 25 MICROGram(s) IV Push every 5 minutes PRN Moderate Pain (4 - 6)  HYDROmorphone  Injectable 0.5 milliGRAM(s) IV Push every 10 minutes PRN Severe Pain (7 - 10)  magnesium hydroxide Suspension 30 milliLiter(s) Oral daily PRN Constipation  ondansetron Injectable 4 milliGRAM(s) IV Push every 6 hours PRN Nausea and/or Vomiting  ondansetron Injectable 4 milliGRAM(s) IV Push once PRN Nausea and/or Vomiting  oxyCODONE    IR 5 milliGRAM(s) Oral every 3 hours PRN Moderate Pain (4 - 6)  oxyCODONE    IR 10 milliGRAM(s) Oral every 3 hours PRN Severe Pain (7 - 10)  SUMAtriptan 100 milliGRAM(s) Oral at bedtime PRN Migraine  traMADol 50 milliGRAM(s) Oral every 4 hours PRN Mild Pain (1 - 3)  traZODone 100 milliGRAM(s) Oral at bedtime PRN for insomnia      Vital Signs Last 24 Hrs  T(C): 36.3 (29 May 2025 13:30), Max: 36.7 (29 May 2025 07:39)  T(F): 97.3 (29 May 2025 13:30), Max: 98.1 (29 May 2025 07:39)  HR: 69 (29 May 2025 15:00) (50 - 85)  BP: 124/73 (29 May 2025 15:00) (101/63 - 133/64)  BP(mean): 87 (29 May 2025 15:00) (74 - 107)  RR: 15 (29 May 2025 15:00) (11 - 22)  SpO2: 100% (29 May 2025 15:00) (96% - 100%)    Parameters below as of 29 May 2025 15:00  Patient On (Oxygen Delivery Method): room air        I&O's Detail    29 May 2025 07:01  -  29 May 2025 15:11  --------------------------------------------------------  IN:    Lactated Ringers: 225 mL    Oral Fluid: 440 mL    Sodium Chloride 0.9% Bolus: 500 mL  Total IN: 1165 mL    OUT:  Total OUT: 0 mL    Total NET: 1165 mL        PHYSICAL EXAM:    GENERAL: NAD, obese  HEAD:  Atraumatic, Normocephalic  EYES: EOMI, PERRLA, conjunctiva and sclera clear  NECK: Supple, No JVD, Normal thyroid  NERVOUS SYSTEM:  Alert & Oriented X3,no focal deficit   CHEST/LUNG: CTA  b/l,  no rales, rhonchi, wheezing, or rubs  HEART: Regular rate and rhythm; No murmurs, rubs, or gallops  ABDOMEN: Soft, Nontender, Nondistended; Bowel sounds present  EXTREMITIES:  2+ Peripheral Pulses, No clubbing, cyanosis, or edema ,   LYMPH: No lymphadenopathy noted  SKIN: No rashes or lesions    RADIOLOGY & ADDITIONAL STUDIES:    < from: Xray Knee 1 or 2 Views, Left (05.29.25 @ 12:14) >    ACC: [id number] EXAM:  XR KNEE 1-2 VIEWS LT   ORDERED BY: TEE SOMMER     PROCEDURE DATE:  05/29/2025          INTERPRETATION:  Frontal and lateral portable radiographs of the left   knee were performed following arthroplasty.    There are no priorrelevant studies for comparison.    The patient has undergone left knee arthroplasty, with the implant   showing good anatomic alignment and no sign of immediate postprocedure   complication. There are typical expected findings in and around the joint   space in the immediate postoperative period, including fluid, edema and   air.    IMPRESSION: Successful left knee arthroplasty with no sign of immediate   postprocedure complication.    --- End of Report ---          < end of copied text >

## 2025-05-29 NOTE — CONSULT NOTE ADULT - ASSESSMENT
59 year old patient with a PMH of anxiety/depression, migraines, gastric ulcers , L knee chronic pain . Patient states she originally presented with bilateral knee pain in September 2024. - denies trauma/falls, just endorses arthritis and wear and tear. Patient subsequently got a right MARCELA knee replacement first in February 2025. Patient has recovering well and is very happy with her right knee replacement surgery thus far. Now, she presents to get her left knee treated. Patient states she has tried different treatment modalities for left knee - PT, PT, injections (none since November 2024), Tramadol, Tylenol, ice - all have yielded minimal relief.       amitriptyline 100 mg oral tablet: 1 tab(s) orally once a day (at bedtime) (29 May 2025 08:19)  FLUoxetine 40 mg oral capsule: 1 cap(s) orally once a day (at bedtime) (29 May 2025 08:19)  multivitamin daily:  (29 May 2025 08:19)  omeprazole 40 mg oral delayed release capsule: 0.5 cap(s) orally once a day (29 May 2025 08:19)  Ozempic 2 mg/1.5 mL (1 mg dose) subcutaneous solution: 1 milligram(s) subcutaneous every 7 days (29 May 2025 08:19)  SUMAtriptan 100 mg oral tablet: 1 tab(s) orally once a day (at bedtime) as needed for migraine (29 May 2025 08:19)  traZODone 50 mg oral tablet: 2 tab(s) orally once a day (at bedtime) as needed for  insomnia (29 May 2025 08:19)    L knee OA , S/P L TKA , POD#0   PT/OT/pain mgmt  DVT prophylaxis- as per ortho  Abx as per SCIP  Incentive spirometry  Prophylaxis of opioid  induced constipation.      Migraines- CONTINUE SUMAtriptan 100 mg oral tablet: 1 tab(s) orally once a day (at bedtime) as needed for migraine      Anxiety and depression: CONTINUE HOME MEDS   amitriptyline 100 mg daily   FLUoxetine 40 mg daily   TraZODone 50 mg oral tablet: 2 tab(s) orally once a day (at bedtime) as needed for  insomnia    Hx of Gastric ulcer- continue PPI , avoid NSAD'S     Obesity - BMI 34.7- class - 2 , S/P GASTRIC BYPASS , on Ozempic - restart on d/c home     DVT prophylaxis  - as per ortho protocol- agree with Keisha   Opioid induced constipation  prophylaxis - bowel regimen     Thank you for the courtesy of this consult ,   will follow along with you    59 year old patient with a PMH of anxiety/depression, migraines, gastric ulcers , L knee chronic pain . Patient states she originally presented with bilateral knee pain in September 2024. - denies trauma/falls, just endorses arthritis and wear and tear. Patient subsequently got a right MARCELA knee replacement first in February 2025. Patient has recovering well and is very happy with her right knee replacement surgery thus far. Now, she presents to get her left knee treated. Patient states she has tried different treatment modalities for left knee - PT, PT, injections (none since November 2024), Tramadol, Tylenol, ice - all have yielded minimal relief.       L knee OA , S/P L TKA , POD#0   PT/OT/pain mgmt  DVT prophylaxis- as per ortho  Abx as per SCIP  Incentive spirometry  Prophylaxis of opioid  induced constipation.      Migraines- CONTINUE SUMAtriptan 100 mg oral tablet: 1 tab(s) orally once a day (at bedtime) as needed for migraine      Anxiety and depression: CONTINUE HOME MEDS   amitriptyline 100 mg daily   FLUoxetine 40 mg daily   TraZODone 50 mg oral tablet: 2 tab(s) orally once a day (at bedtime) as needed for  insomnia    Hx of Gastric ulcer- continue PPI , avoid NSAD'S     Obesity - BMI 34.7- class - 2 , S/P GASTRIC BYPASS , on Ozempic - restart on d/c home     DVT prophylaxis  - as per ortho protocol- agree with Keisha   Opioid induced constipation  prophylaxis - bowel regimen     Thank you for the courtesy of this consult ,   will follow along with you

## 2025-05-29 NOTE — DISCHARGE NOTE PROVIDER - HOSPITAL COURSE
The patient underwent a LEFT TOTAL KNEE REPLACEMENT on 5/29/25. The patient received antibiotics consistent with SCIP guidelines. The patient underwent the procedure and had no intra-operative complications. Post-operatively, the patient was seen by medicine and PT. The patient received ELIQUIS for DVTP. The patient received pain medications per orthopedic pain management pathway and the pain was appropriately controlled. The patient did not have any post-operative medical complications. The patient was discharged in stable condition.

## 2025-05-29 NOTE — DISCHARGE NOTE PROVIDER - NSDCMRMEDTOKEN_GEN_ALL_CORE_FT
amitriptyline 100 mg oral tablet: 1 tab(s) orally once a day (at bedtime)  FLUoxetine 40 mg oral capsule: 1 cap(s) orally once a day (at bedtime)  multivitamin daily:   omeprazole 40 mg oral delayed release capsule: 0.5 cap(s) orally once a day  Ozempic 2 mg/1.5 mL (1 mg dose) subcutaneous solution: 1 milligram(s) subcutaneous every 7 days  SUMAtriptan 100 mg oral tablet: 1 tab(s) orally once a day (at bedtime) as needed for migraine  traMADol 50 mg oral tablet: 1 tab(s) orally every 6 hours as needed for  moderate pain MDD: 4 tabs  traZODone 50 mg oral tablet: 2 tab(s) orally once a day (at bedtime) as needed for  insomnia  Tylenol 325 mg oral capsule: 3 cap(s) orally every 8 hours MDD: 9   acetaminophen 325 mg oral tablet: 3 tab(s) orally every 8 hours  amitriptyline 100 mg oral tablet: 1 tab(s) orally once a day (at bedtime)  CeleBREX 200 mg oral capsule: 1 cap(s) orally 2 times a day  Eliquis 2.5 mg oral tablet: 1 tab(s) orally 2 times a day  FLUoxetine 40 mg oral capsule: 1 cap(s) orally once a day (at bedtime)  Narcan 4 mg/0.1 mL nasal spray: 1 spray(s) intranasally once as needed for oversedation  omeprazole 20 mg oral delayed release tablet: 1 tab(s) orally once a day  oxyCODONE 5 mg oral tablet: 1 tab(s) orally every 4 hours as needed for Pain MDD: 6  Ozempic 2 mg/1.5 mL (1 mg dose) subcutaneous solution: 1 milligram(s) subcutaneous every 7 days  Senna S 50 mg-8.6 mg oral tablet: 2 tab(s) orally once a day  SUMAtriptan 100 mg oral tablet: 1 tab(s) orally once a day (at bedtime) as needed for migraine  traZODone 50 mg oral tablet: 2 tab(s) orally once a day (at bedtime) as needed for  insomnia

## 2025-05-29 NOTE — PHYSICAL THERAPY INITIAL EVALUATION ADULT - ADDITIONAL COMMENTS
pt reports living in private home with her  and daughters who are able to assist prn. 3 EDGAR with handrail and no stairs inside. Independent prior to admission. Owns RW, cane, shower chair, and commode. Recently had her other knee replaced in feb 2025

## 2025-05-29 NOTE — DISCHARGE NOTE PROVIDER - NSDCFUSCHEDAPPT_GEN_ALL_CORE_FT
Elinor Livingston  [facility] Physician [facility]  [facility] [address]  Scheduled Appointment: 06/12/2025    Clarence Hernandez  [facility]  ORTHOSURG [address]  Scheduled Appointment: 06/19/2025    Clarence Hernandez  [facility]  ORTHOSURG [address]  Scheduled Appointment: 07/08/2025    Clarence Hernandez  [facility]  ORTHOSURG [address]  Scheduled Appointment: 08/19/2025

## 2025-05-30 ENCOUNTER — TRANSCRIPTION ENCOUNTER (OUTPATIENT)
Age: 60
End: 2025-05-30

## 2025-05-30 VITALS
SYSTOLIC BLOOD PRESSURE: 103 MMHG | RESPIRATION RATE: 18 BRPM | OXYGEN SATURATION: 94 % | DIASTOLIC BLOOD PRESSURE: 64 MMHG | HEART RATE: 93 BPM | TEMPERATURE: 98 F

## 2025-05-30 LAB
ANION GAP SERPL CALC-SCNC: 11 MMOL/L — SIGNIFICANT CHANGE UP (ref 5–17)
BUN SERPL-MCNC: 14.7 MG/DL — SIGNIFICANT CHANGE UP (ref 8–20)
CALCIUM SERPL-MCNC: 8.3 MG/DL — LOW (ref 8.4–10.5)
CHLORIDE SERPL-SCNC: 103 MMOL/L — SIGNIFICANT CHANGE UP (ref 96–108)
CO2 SERPL-SCNC: 25 MMOL/L — SIGNIFICANT CHANGE UP (ref 22–29)
CREAT SERPL-MCNC: 0.48 MG/DL — LOW (ref 0.5–1.3)
EGFR: 108 ML/MIN/1.73M2 — SIGNIFICANT CHANGE UP
EGFR: 108 ML/MIN/1.73M2 — SIGNIFICANT CHANGE UP
GLUCOSE SERPL-MCNC: 100 MG/DL — HIGH (ref 70–99)
HCT VFR BLD CALC: 36.2 % — SIGNIFICANT CHANGE UP (ref 34.5–45)
HGB BLD-MCNC: 11.5 G/DL — SIGNIFICANT CHANGE UP (ref 11.5–15.5)
MCHC RBC-ENTMCNC: 28.7 PG — SIGNIFICANT CHANGE UP (ref 27–34)
MCHC RBC-ENTMCNC: 31.8 G/DL — LOW (ref 32–36)
MCV RBC AUTO: 90.3 FL — SIGNIFICANT CHANGE UP (ref 80–100)
NRBC # BLD AUTO: 0 K/UL — SIGNIFICANT CHANGE UP (ref 0–0)
NRBC # FLD: 0 K/UL — SIGNIFICANT CHANGE UP (ref 0–0)
NRBC BLD AUTO-RTO: 0 /100 WBCS — SIGNIFICANT CHANGE UP (ref 0–0)
PLATELET # BLD AUTO: 253 K/UL — SIGNIFICANT CHANGE UP (ref 150–400)
PMV BLD: 9.9 FL — SIGNIFICANT CHANGE UP (ref 7–13)
POTASSIUM SERPL-MCNC: 4.1 MMOL/L — SIGNIFICANT CHANGE UP (ref 3.5–5.3)
POTASSIUM SERPL-SCNC: 4.1 MMOL/L — SIGNIFICANT CHANGE UP (ref 3.5–5.3)
RBC # BLD: 4.01 M/UL — SIGNIFICANT CHANGE UP (ref 3.8–5.2)
RBC # FLD: 14.2 % — SIGNIFICANT CHANGE UP (ref 10.3–14.5)
SODIUM SERPL-SCNC: 139 MMOL/L — SIGNIFICANT CHANGE UP (ref 135–145)
WBC # BLD: 12.38 K/UL — HIGH (ref 3.8–10.5)
WBC # FLD AUTO: 12.38 K/UL — HIGH (ref 3.8–10.5)

## 2025-05-30 PROCEDURE — 99232 SBSQ HOSP IP/OBS MODERATE 35: CPT

## 2025-05-30 PROCEDURE — 85027 COMPLETE CBC AUTOMATED: CPT

## 2025-05-30 PROCEDURE — 80048 BASIC METABOLIC PNL TOTAL CA: CPT

## 2025-05-30 PROCEDURE — S2900: CPT

## 2025-05-30 PROCEDURE — C1713: CPT

## 2025-05-30 PROCEDURE — C1776: CPT

## 2025-05-30 PROCEDURE — 36415 COLL VENOUS BLD VENIPUNCTURE: CPT

## 2025-05-30 PROCEDURE — 73560 X-RAY EXAM OF KNEE 1 OR 2: CPT

## 2025-05-30 RX ORDER — OXYCODONE HYDROCHLORIDE 30 MG/1
1 TABLET ORAL
Qty: 28 | Refills: 0
Start: 2025-05-30

## 2025-05-30 RX ORDER — ACETAMINOPHEN 500 MG/5ML
3 LIQUID (ML) ORAL
Qty: 0 | Refills: 0 | DISCHARGE
Start: 2025-05-30

## 2025-05-30 RX ORDER — NALOXONE HYDROCHLORIDE 0.4 MG/ML
1 INJECTION, SOLUTION INTRAMUSCULAR; INTRAVENOUS; SUBCUTANEOUS
Qty: 1 | Refills: 0
Start: 2025-05-30

## 2025-05-30 RX ORDER — SENNOSIDES, DOCUSATE SODIUM 8.6; 5 MG/1; MG/1
2 TABLET ORAL
Qty: 30 | Refills: 0
Start: 2025-05-30 | End: 2025-06-13

## 2025-05-30 RX ORDER — APIXABAN 2.5 MG/1
1 TABLET, FILM COATED ORAL
Qty: 28 | Refills: 0
Start: 2025-05-30 | End: 2025-06-12

## 2025-05-30 RX ORDER — CELECOXIB 50 MG/1
1 CAPSULE ORAL
Qty: 42 | Refills: 0
Start: 2025-05-30 | End: 2025-06-19

## 2025-05-30 RX ORDER — OMEPRAZOLE 20 MG/1
1 CAPSULE, DELAYED RELEASE ORAL
Qty: 42 | Refills: 0
Start: 2025-05-30 | End: 2025-07-10

## 2025-05-30 RX ADMIN — Medication 40 MILLIGRAM(S): at 05:11

## 2025-05-30 RX ADMIN — OXYCODONE HYDROCHLORIDE 5 MILLIGRAM(S): 30 TABLET ORAL at 11:37

## 2025-05-30 RX ADMIN — Medication 1000 MILLIGRAM(S): at 06:11

## 2025-05-30 RX ADMIN — KETOROLAC TROMETHAMINE 15 MILLIGRAM(S): 30 INJECTION, SOLUTION INTRAMUSCULAR; INTRAVENOUS at 05:12

## 2025-05-30 RX ADMIN — Medication 400 MILLIGRAM(S): at 05:11

## 2025-05-30 RX ADMIN — APIXABAN 2.5 MILLIGRAM(S): 2.5 TABLET, FILM COATED ORAL at 05:10

## 2025-05-30 RX ADMIN — OXYCODONE HYDROCHLORIDE 5 MILLIGRAM(S): 30 TABLET ORAL at 12:37

## 2025-05-30 RX ADMIN — Medication 2000 MILLIGRAM(S): at 01:01

## 2025-05-30 RX ADMIN — KETOROLAC TROMETHAMINE 15 MILLIGRAM(S): 30 INJECTION, SOLUTION INTRAMUSCULAR; INTRAVENOUS at 01:01

## 2025-05-30 RX ADMIN — KETOROLAC TROMETHAMINE 15 MILLIGRAM(S): 30 INJECTION, SOLUTION INTRAMUSCULAR; INTRAVENOUS at 06:11

## 2025-05-30 RX ADMIN — OXYCODONE HYDROCHLORIDE 5 MILLIGRAM(S): 30 TABLET ORAL at 06:17

## 2025-05-30 RX ADMIN — KETOROLAC TROMETHAMINE 15 MILLIGRAM(S): 30 INJECTION, SOLUTION INTRAMUSCULAR; INTRAVENOUS at 02:00

## 2025-05-30 NOTE — DISCHARGE NOTE NURSING/CASE MANAGEMENT/SOCIAL WORK - PATIENT PORTAL LINK FT
You can access the FollowMyHealth Patient Portal offered by [facility] by registering at the following website: [email or web address]. By joining [employer]’s FollowMyHealth portal, you will also be able to view your health information using other applications (apps) compatible with our system.

## 2025-05-30 NOTE — PROGRESS NOTE ADULT - ASSESSMENT
59 year old patient with a PMH of anxiety/depression, migraines, gastric ulcers , L knee chronic pain . Patient states she originally presented with bilateral knee pain in September 2024. - denies trauma/falls, just endorses arthritis and wear and tear. Patient subsequently got a right MARCELA knee replacement first in February 2025. Patient has recovering well and is very happy with her right knee replacement surgery thus far. Now, she presents to get her left knee treated. Patient states she has tried different treatment modalities for left knee - PT, PT, injections (none since November 2024), Tramadol, Tylenol, ice - all have yielded minimal relief.       L knee OA , S/P L TKA , POD#1  PT/OT/pain mgmt  DVT prophylaxis- as per ortho  Abx as per SCIP completed  Incentive spirometry  Prophylaxis of opioid  induced constipation.      Migraines- CONTINUE SUMAtriptan 100 mg oral tablet: 1 tab(s) orally once a day (at bedtime) as needed for migraine      Anxiety and depression: CONTINUE HOME MEDS   amitriptyline 100 mg daily   FLUoxetine 40 mg daily   TraZODone 50 mg oral tablet: 2 tab(s) orally once a day (at bedtime) as needed for  insomnia    Hx of Gastric ulcer- continue PPI , avoid NSAD'S     Obesity - BMI 34.7- class - 2 , S/P GASTRIC BYPASS , on Ozempic - restart on d/c home     DVT prophylaxis  - as per ortho protocol- agree with Eliquis   Opioid induced constipation  prophylaxis - bowel regimen     No medical contraindications to DC once cleared by Ortho and PT

## 2025-05-30 NOTE — DISCHARGE NOTE NURSING/CASE MANAGEMENT/SOCIAL WORK - FINANCIAL ASSISTANCE
[facility] provides services at a reduced cost to those who are determined to be eligible through [facility]’s financial assistance program. Information regarding [facility]’s financial assistance program can be found by going to [email or web address] or by calling [phone].

## 2025-05-30 NOTE — PROGRESS NOTE ADULT - SUBJECTIVE AND OBJECTIVE BOX
Ortho Post Op Check  Name: DORIS SNEED  MR #: [id number]    Procedure: left TKA  Surgeon: Dr. Hernandez    Patient interviewed and examined at bedside with  Kedar ID # [id number]. Pt reporting some mild-moderate pain that is responding well to the current medications. Patient otherwise resting comfortably without complaints  Denies CP, SOB, N/V, numbness/tingling, acute motor weakness.    General Exam:  Vital Signs Last 24 Hrs  T(C): 36.7 (05-29-25 @ 15:23), Max: 36.7 (05-29-25 @ 15:23)  T(F): 98 (05-29-25 @ 15:23), Max: 98 (05-29-25 @ 15:23)  HR: 79 (05-29-25 @ 15:23) (50 - 85)  BP: 129/79 (05-29-25 @ 15:23) (101/63 - 133/64)  BP(mean): 97 (05-29-25 @ 15:17) (74 - 107)  RR: 17 (05-29-25 @ 15:23) (11 - 22)  SpO2: 96% (05-29-25 @ 15:23) (96% - 100%)    General: Alert and oriented, NAD, controlled pain.  Dressings C/D/I. No bleeding.  Pulses: 2+ dorsalis pedis pulse. Cap refill < 2 sec.  Sensation: Grossly intact to light touch without deficit.  Motor: + EHL/FHL/TA/GS    T(C): 36.7 (05-29-25 @ 15:23), Max: 36.7 (05-29-25 @ 07:39)  HR: 79 (05-29-25 @ 15:23) (50 - 85)  BP: 129/79 (05-29-25 @ 15:23) (101/63 - 133/64)  RR: 17 (05-29-25 @ 15:23) (11 - 22)  SpO2: 96% (05-29-25 @ 15:23) (96% - 100%)    Post-op X-Ray:    A/P: 59F POD #0 s/p left total knee arthroplasty   - Stable  - Pain Control  - DVT ppx: Eliquis 2.5 BID  - Post op abx: Ancef  - Physical therapy  - Weight bearing status: WBAT
CC: Left TKA    INTERVAL HPI/OVERNIGHT EVENTS: No acute issues overnight. worked with PT. Pain controlled on current RX. Anticipates going home today.       ROS:  Constitutional, Eyes, ENT, Cardiovascular, Respiratory, Gastrointestinal, Genitourinary, Musculoskeletal, Integumentary, Neurological, Psychiatric, Endocrine, Heme/Lymph, and Allergic/Immunologic review of systems are otherwise negative except as noted in the HPI    Vital Signs Last 24 Hrs  T(C): 36.4 (30 May 2025 08:19), Max: 36.9 (29 May 2025 19:45)  T(F): 97.6 (30 May 2025 08:19), Max: 98.5 (29 May 2025 19:45)  HR: 71 (30 May 2025 08:19) (50 - 85)  BP: 101/60 (30 May 2025 08:19) (101/60 - 143/87)  BP(mean): 97 (29 May 2025 15:17) (74 - 107)  RR: 18 (30 May 2025 08:19) (11 - 22)  SpO2: 95% (30 May 2025 08:19) (95% - 100%)    Parameters below as of 30 May 2025 08:19  Patient On (Oxygen Delivery Method): room air    PHYSICAL EXAM:    GENERAL: NAD, obese  HEAD:  Atraumatic, Normocephalic  EYES: EOMI, PERRLA, conjunctiva and sclera clear  NECK: Supple, No JVD   NERVOUS SYSTEM:  Alert & Oriented X3,no focal deficit   CHEST/LUNG: CTA  b/l,  no rales, rhonchi, wheezing, or rubs  HEART: Regular rate and rhythm; No murmurs, rubs, or gallops  ABDOMEN: Soft, Nontender, Nondistended; Bowel sounds present  EXTREMITIES:  2+ Peripheral Pulses, No clubbing, cyanosis, or edema , Left knee dressing C/D/I  SKIN: No rashes or lesions      LABS:                        11.5   12.38 )-----------( 253      ( 30 May 2025 06:07 )             36.2     05-30    139  |  103  |  14.7  ----------------------------<  100[H]  4.1   |  25.0  |  0.48[L]    Ca    8.3[L]      30 May 2025 06:07        Urinalysis Basic - ( 30 May 2025 06:07 )    Color: x / Appearance: x / SG: x / pH: x  Gluc: 100 mg/dL / Ketone: x  / Bili: x / Urobili: x   Blood: x / Protein: x / Nitrite: x   Leuk Esterase: x / RBC: x / WBC x   Sq Epi: x / Non Sq Epi: x / Bacteria: x          MEDICATIONS  (STANDING):  acetaminophen     Tablet .. 975 milliGRAM(s) Oral every 8 hours  amitriptyline 100 milliGRAM(s) Oral at bedtime  apixaban 2.5 milliGRAM(s) Oral two times a day  FLUoxetine 40 milliGRAM(s) Oral at bedtime  ketorolac   Injectable 15 milliGRAM(s) IV Push once  ketorolac   Injectable 15 milliGRAM(s) IV Push every 6 hours  pantoprazole    Tablet 40 milliGRAM(s) Oral before breakfast  polyethylene glycol 3350 17 Gram(s) Oral at bedtime  senna 2 Tablet(s) Oral at bedtime  sodium chloride 0.9%. 1000 milliLiter(s) (100 mL/Hr) IV Continuous <Continuous>    MEDICATIONS  (PRN):  magnesium hydroxide Suspension 30 milliLiter(s) Oral daily PRN Constipation  ondansetron Injectable 4 milliGRAM(s) IV Push every 6 hours PRN Nausea and/or Vomiting  oxyCODONE    IR 5 milliGRAM(s) Oral every 3 hours PRN Moderate Pain (4 - 6)  oxyCODONE    IR 10 milliGRAM(s) Oral every 3 hours PRN Severe Pain (7 - 10)  SUMAtriptan 100 milliGRAM(s) Oral at bedtime PRN Migraine  traMADol 50 milliGRAM(s) Oral every 4 hours PRN Mild Pain (1 - 3)  traZODone 100 milliGRAM(s) Oral at bedtime PRN for insomnia      RADIOLOGY & ADDITIONAL TESTS:  
DORIS CELESTEJULIUSCAROLYN  [id number]    History: 59y Female is status post left total knee arthroplasty, POD #1. Patient is doing well and is comfortable. The patient's pain is controlled using the prescribed pain medications. She says she is doing much better than last time. The patient is participating in physical therapy. Denies nausea, vomiting, chest pain, shortness of breath, abdominal pain or dizziness. No new complaints. Seen with family member at bedside.  deferred.                              11.5   12.38 )-----------( 253      ( 30 May 2025 06:07 )             36.2     05-30    139  |  103  |  14.7  ----------------------------<  100[H]  4.1   |  25.0  |  0.48[L]    Ca    8.3[L]      30 May 2025 06:07        MEDICATIONS  (STANDING):  acetaminophen     Tablet .. 975 milliGRAM(s) Oral every 8 hours  amitriptyline 100 milliGRAM(s) Oral at bedtime  apixaban 2.5 milliGRAM(s) Oral two times a day  FLUoxetine 40 milliGRAM(s) Oral at bedtime  ketorolac   Injectable 15 milliGRAM(s) IV Push once  ketorolac   Injectable 15 milliGRAM(s) IV Push every 6 hours  pantoprazole    Tablet 40 milliGRAM(s) Oral before breakfast  polyethylene glycol 3350 17 Gram(s) Oral at bedtime  senna 2 Tablet(s) Oral at bedtime  sodium chloride 0.9%. 1000 milliLiter(s) (100 mL/Hr) IV Continuous <Continuous>    MEDICATIONS  (PRN):  magnesium hydroxide Suspension 30 milliLiter(s) Oral daily PRN Constipation  ondansetron Injectable 4 milliGRAM(s) IV Push every 6 hours PRN Nausea and/or Vomiting  oxyCODONE    IR 5 milliGRAM(s) Oral every 3 hours PRN Moderate Pain (4 - 6)  oxyCODONE    IR 10 milliGRAM(s) Oral every 3 hours PRN Severe Pain (7 - 10)  SUMAtriptan 100 milliGRAM(s) Oral at bedtime PRN Migraine  traMADol 50 milliGRAM(s) Oral every 4 hours PRN Mild Pain (1 - 3)  traZODone 100 milliGRAM(s) Oral at bedtime PRN for insomnia    Vital Signs Last 24 Hrs  T(C): 36.4 (30 May 2025 05:18), Max: 36.9 (29 May 2025 19:45)  T(F): 97.6 (30 May 2025 05:18), Max: 98.5 (29 May 2025 19:45)  HR: 69 (30 May 2025 05:18) (50 - 85)  BP: 107/63 (30 May 2025 05:18) (101/63 - 143/87)  BP(mean): 97 (29 May 2025 15:17) (74 - 107)  RR: 18 (30 May 2025 05:18) (11 - 22)  SpO2: 96% (30 May 2025 05:18) (95% - 100%)    Parameters below as of 30 May 2025 05:18  Patient On (Oxygen Delivery Method): room air  Lying in bed in NAD, awake and alert    Physical exam: The left knee dressing is clean, dry and intact. No drainage or discharge. No erythema is noted. No blistering. No ecchymosis. The calf is supple. No calf tenderness. Sensation to light touch is grossly intact distally. Motor function distally is 5/5. Extensor hallucis longus and flexor hallucis longus are intact. No foot drop. 2+ dorsalis pedis pulse. Capillary refill is less than 2 seconds. No cyanosis.    Primary Orthopedic Assessment:  •	s/p LEFT total knee replacement, POD#1  	    Plan:   •	DVT prophylaxis as prescribed- eliquis, including use of compression devices and ankle pumps  •	Continue physical therapy  •	Weightbearing as tolerated of the left lower extremity with assistance of a walker  •	Incentive spirometry encouraged  •	Pain control as clinically indicated  •	Discharge planning – anticipated discharge is Home after cleared by PT and medicine

## 2025-06-04 NOTE — CHART NOTE - NSCHARTNOTEFT_GEN_A_CORE
Post-Discharge Medication Review: Completed	    Patient's preferred pharmacy was updated in OMR: [facility]	  	  Caregiver (Shanta, daughter) contacted to offer medication counseling post-discharge. Medication reconciliation completed. Per Caregiver, medications include:	  	  1.	acetaminophen 325 mg oral tablet 3 tab(s) orally every 8 hours  2.	amitriptyline 100 mg oral tablet 1 tab(s) orally once a day (at bedtime)  3.	CeleBREX 200 mg oral capsule 1 cap(s) orally 2 times a day  4.	Eliquis 2.5 mg oral tablet 1 tab(s) orally 2 times a day  5.	FLUoxetine 40 mg oral capsule 1 cap(s) orally once a day (at bedtime)  6.	Narcan 4 mg/0.1 mL nasal spray 1 spray(s) intranasally once as needed for oversedation  7.	omeprazole 20 mg oral delayed release tablet 1 tab(s) orally once a day  8.	oxyCODONE 5 mg oral tablet 1 tab(s) orally every 4 hours as needed for Pain MDD: 6  9.	Ozempic 2 mg/1.5 mL (1 mg dose) subcutaneous solution 1 milligram(s) subcutaneous every 7 days  10.	Senna S 50 mg-8.6 mg oral tablet 2 tab(s) orally once a day  11.	SUMAtriptan 100 mg oral tablet 1 tab(s) orally once a day (at bedtime) as needed for migraine  12.	traZODone 50 mg oral tablet 2 tab(s) orally once a day (at bedtime) as needed for insomnia   	  	  	  Medication name, indication, administration, side effects, and monitoring reviewed for new medications during post discharge counseling visit with Caregiver. Caregiver demonstrated understanding. Counseling offered for all medications.	  	  	  Verified with inpatient team, patient to decrease duration of omeprazole from 42 days, to 21 while on celecoxib therapy. 	  	  Armida Palma, PharmD	  Clinical Pharmacy Specialist, Pharmacy Telehealth Team	  Can be reached via [place] Teams or [phone]

## 2025-06-06 RX ORDER — OXYCODONE 5 MG/1
5 TABLET ORAL
Qty: 42 | Refills: 0 | Status: ACTIVE | COMMUNITY
Start: 2025-06-06 | End: 1900-01-01

## 2025-06-12 ENCOUNTER — APPOINTMENT (OUTPATIENT)
Dept: FAMILY MEDICINE | Facility: CLINIC | Age: 60
End: 2025-06-12
Payer: MEDICAID

## 2025-06-12 VITALS
DIASTOLIC BLOOD PRESSURE: 74 MMHG | WEIGHT: 215 LBS | BODY MASS INDEX: 34.55 KG/M2 | SYSTOLIC BLOOD PRESSURE: 114 MMHG | TEMPERATURE: 98.3 F | HEART RATE: 88 BPM | OXYGEN SATURATION: 98 % | RESPIRATION RATE: 16 BRPM | HEIGHT: 66 IN

## 2025-06-12 PROCEDURE — 99214 OFFICE O/P EST MOD 30 MIN: CPT

## 2025-06-12 PROCEDURE — G2211 COMPLEX E/M VISIT ADD ON: CPT | Mod: NC

## 2025-06-19 ENCOUNTER — APPOINTMENT (OUTPATIENT)
Dept: ORTHOPEDIC SURGERY | Facility: CLINIC | Age: 60
End: 2025-06-19
Payer: MEDICAID

## 2025-06-19 PROCEDURE — 99024 POSTOP FOLLOW-UP VISIT: CPT

## 2025-06-19 PROCEDURE — 73562 X-RAY EXAM OF KNEE 3: CPT | Mod: LT

## 2025-06-30 ENCOUNTER — APPOINTMENT (OUTPATIENT)
Dept: FAMILY MEDICINE | Facility: CLINIC | Age: 60
End: 2025-06-30

## 2025-07-08 ENCOUNTER — APPOINTMENT (OUTPATIENT)
Dept: ORTHOPEDIC SURGERY | Facility: CLINIC | Age: 60
End: 2025-07-08
Payer: MEDICAID

## 2025-07-08 PROCEDURE — 99024 POSTOP FOLLOW-UP VISIT: CPT

## 2025-07-08 PROCEDURE — 73562 X-RAY EXAM OF KNEE 3: CPT | Mod: LT

## 2025-08-19 ENCOUNTER — APPOINTMENT (OUTPATIENT)
Dept: ORTHOPEDIC SURGERY | Facility: CLINIC | Age: 60
End: 2025-08-19
Payer: MEDICAID

## 2025-08-19 DIAGNOSIS — M54.12 RADICULOPATHY, CERVICAL REGION: ICD-10-CM

## 2025-08-19 DIAGNOSIS — Z96.651 PRESENCE OF RIGHT ARTIFICIAL KNEE JOINT: ICD-10-CM

## 2025-08-19 DIAGNOSIS — Z96.652 PRESENCE OF LEFT ARTIFICIAL KNEE JOINT: ICD-10-CM

## 2025-08-19 PROCEDURE — 99024 POSTOP FOLLOW-UP VISIT: CPT

## 2025-08-19 PROCEDURE — 73562 X-RAY EXAM OF KNEE 3: CPT | Mod: LT

## 2025-08-20 ENCOUNTER — APPOINTMENT (OUTPATIENT)
Dept: PHYSICAL MEDICINE AND REHAB | Facility: CLINIC | Age: 60
End: 2025-08-20
Payer: MEDICAID

## 2025-08-20 VITALS
WEIGHT: 216 LBS | HEIGHT: 65 IN | SYSTOLIC BLOOD PRESSURE: 107 MMHG | RESPIRATION RATE: 14 BRPM | BODY MASS INDEX: 35.99 KG/M2 | HEART RATE: 77 BPM | DIASTOLIC BLOOD PRESSURE: 70 MMHG

## 2025-08-20 DIAGNOSIS — M54.2 CERVICALGIA: ICD-10-CM

## 2025-08-20 DIAGNOSIS — M25.511 PAIN IN RIGHT SHOULDER: ICD-10-CM

## 2025-08-20 DIAGNOSIS — M79.18 MYALGIA, OTHER SITE: ICD-10-CM

## 2025-08-20 DIAGNOSIS — M75.81 OTHER SHOULDER LESIONS, RIGHT SHOULDER: ICD-10-CM

## 2025-08-20 PROCEDURE — 99204 OFFICE O/P NEW MOD 45 MIN: CPT

## 2025-08-20 RX ORDER — METHOCARBAMOL 500 MG/1
500 TABLET, FILM COATED ORAL TWICE DAILY
Qty: 60 | Refills: 1 | Status: ACTIVE | COMMUNITY
Start: 2025-08-20 | End: 1900-01-01

## 2025-09-02 ENCOUNTER — APPOINTMENT (OUTPATIENT)
Dept: FAMILY MEDICINE | Facility: CLINIC | Age: 60
End: 2025-09-02
Payer: MEDICAID

## 2025-09-02 VITALS
DIASTOLIC BLOOD PRESSURE: 72 MMHG | WEIGHT: 213 LBS | OXYGEN SATURATION: 97 % | TEMPERATURE: 97.2 F | HEIGHT: 65 IN | BODY MASS INDEX: 35.49 KG/M2 | SYSTOLIC BLOOD PRESSURE: 130 MMHG | HEART RATE: 80 BPM | RESPIRATION RATE: 14 BRPM

## 2025-09-02 DIAGNOSIS — E66.9 OBESITY, UNSPECIFIED: ICD-10-CM

## 2025-09-02 DIAGNOSIS — G47.33 OBSTRUCTIVE SLEEP APNEA (ADULT) (PEDIATRIC): ICD-10-CM

## 2025-09-02 DIAGNOSIS — I10 ESSENTIAL (PRIMARY) HYPERTENSION: ICD-10-CM

## 2025-09-02 PROCEDURE — 99214 OFFICE O/P EST MOD 30 MIN: CPT

## 2025-09-02 PROCEDURE — G2211 COMPLEX E/M VISIT ADD ON: CPT | Mod: NC

## 2025-09-15 ENCOUNTER — RESULT REVIEW (OUTPATIENT)
Age: 60
End: 2025-09-15

## 2025-09-15 ENCOUNTER — APPOINTMENT (OUTPATIENT)
Dept: ULTRASOUND IMAGING | Facility: CLINIC | Age: 60
End: 2025-09-15
Payer: MEDICAID

## 2025-09-15 PROCEDURE — 20611 DRAIN/INJ JOINT/BURSA W/US: CPT | Mod: RT

## 2025-09-22 PROBLEM — Z23 ENCOUNTER FOR IMMUNIZATION: Status: ACTIVE | Noted: 2025-09-22 | Resolved: 2025-10-06

## 2025-09-25 PROBLEM — R74.8 ELEVATED ALKALINE PHOSPHATASE LEVEL: Status: ACTIVE | Noted: 2025-09-25

## (undated) DEVICE — SOL IRR BAG NS 0.9% 3000ML

## (undated) DEVICE — DRAPE IOBAN 33" X 23"

## (undated) DEVICE — DRSG DERMABOND PRINEO 60CM

## (undated) DEVICE — SUT STRATAFIX SPIRAL PDS PLUS 1 35X35CM CTX VIOLET

## (undated) DEVICE — DRAPE 3/4 SHEET 52X76"

## (undated) DEVICE — PACK ROBOTIC

## (undated) DEVICE — WOUND IRR SURGIPHOR

## (undated) DEVICE — Device

## (undated) DEVICE — D HELP - CLEARVIEW CLEARIFY SYSTEM

## (undated) DEVICE — SLING ARM CHIEFTAIN MESH LARGE

## (undated) DEVICE — DRSG CURITY GAUZE SPONGE 4 X 4" 12-PLY NON-STERILE

## (undated) DEVICE — DRAPE STICKY U BLUE 60 X 84"

## (undated) DEVICE — DRSG 2X2

## (undated) DEVICE — XI CORD BIPOLAR CAUTERY (BLUE)

## (undated) DEVICE — GLV 7.5 PROTEXIS (WHITE)

## (undated) DEVICE — XI SEAL UNIVERSIAL 5-12MM

## (undated) DEVICE — SUT VICRYL PLUS 0 18" CT-1 UNDYED (POP-OFF)

## (undated) DEVICE — ELCTR STRYKER NEPTUNE SMOKE EVACUATION PENCIL (GREEN)

## (undated) DEVICE — VENODYNE/SCD SLEEVE CALF MEDIUM

## (undated) DEVICE — PACK TOTAL KNEE

## (undated) DEVICE — SYR LUER LOK 50CC

## (undated) DEVICE — SUT VICRYL 2-0 27" CT-2 UNDYED

## (undated) DEVICE — SLING SHOULDER IMMOBILIZER CLINIC LARGE

## (undated) DEVICE — GLV 8 PROTEXIS (BLUE)

## (undated) DEVICE — MAKO CHECKPOINT KIT FEMORAL / TIBIAL

## (undated) DEVICE — SYR SLIP 10CC

## (undated) DEVICE — DRSG MEPILEX 10 X 25CM (4 X 10") POST OP AG SILVER

## (undated) DEVICE — XI CORD MONOPOLAR CAUTERY (GREEN)

## (undated) DEVICE — XI ARM CLIP APPLIER LARGE

## (undated) DEVICE — XI ARM FORCEP CADIERE 8MM

## (undated) DEVICE — DRSG DERMABOND 0.7ML

## (undated) DEVICE — SYR LUER SLIP TIP 50CC

## (undated) DEVICE — ZIMMER PULSAVAC PLUS FAN KIT

## (undated) DEVICE — ELCTR AQUAMANTYS BIPOLAR SEALER 6.0

## (undated) DEVICE — ELCTR GROUNDING PAD ADULT COVIDIEN

## (undated) DEVICE — ZIMMER MIXING BOWL

## (undated) DEVICE — MAKO VIZADISC KNEE TRACKING KIT

## (undated) DEVICE — DRSG ACE BANDAGE 6"

## (undated) DEVICE — DRAPE XL SHEET 77X98"

## (undated) DEVICE — DRSG WEBRIL 6"

## (undated) DEVICE — NDL HYPO SAFE 20G X 1.5" (YELLOW)

## (undated) DEVICE — SUT STRATAFIX SPIRAL MONOCRYL PLUS 3-0 30CM PS-2 UNDYED

## (undated) DEVICE — GOWN IMPERV XL

## (undated) DEVICE — HOOD T7 NON-PEELAWAY

## (undated) DEVICE — DRAPE TOWEL BLUE STICKY

## (undated) DEVICE — XI DRAPE COLUMN

## (undated) DEVICE — SOL IRR POUR NS 0.9% 1000ML

## (undated) DEVICE — SOL IRR POUR H2O 1000ML

## (undated) DEVICE — GLV 7.5 PROTEXIS (BLUE)

## (undated) DEVICE — WARMING BLANKET FULL ADULT

## (undated) DEVICE — XI ARM FORCEP PROGRASP 8MM

## (undated) DEVICE — WARMING BLANKET UPPER ADULT

## (undated) DEVICE — MAKO BLADE NARROW

## (undated) DEVICE — CATH IV SAFE BC 22G X 1" (BLUE)

## (undated) DEVICE — SYR IV FLUSH SALINE 10ML 30/TY

## (undated) DEVICE — XI DRAPE ARM

## (undated) DEVICE — GLV 8 PROTEXIS ORTHO (BROWN)

## (undated) DEVICE — TAPE SILK 3"

## (undated) DEVICE — MAKO BLADE STANDARD

## (undated) DEVICE — FORCEP RADIAL JAW 4 W NDL 2.4MM 2.8MM 240CM ORANGE DISP

## (undated) DEVICE — DRAPE GENERAL ENDOSCOPY

## (undated) DEVICE — SUT VICRYL PLUS 0 27" CT-2 UNDYED

## (undated) DEVICE — MAKO DRAPE KIT

## (undated) DEVICE — SSH-ERBE 26004501: Type: DURABLE MEDICAL EQUIPMENT

## (undated) DEVICE — SUT MONOCRYL 4-0 27" PS-2 UNDYED

## (undated) DEVICE — DRAPE 1/2 SHEET 40X57"

## (undated) DEVICE — INSUFFLATION NDL COVIDIEN SURGINEEDLE VERESS 120MM

## (undated) DEVICE — GOWN ROYAL SILK XL

## (undated) DEVICE — ENDOCATCH 10MM SPECIMEN POUCH

## (undated) DEVICE — XI OBTURATOR OPTICAL BLADELESS 8MM